# Patient Record
Sex: MALE | Race: WHITE | NOT HISPANIC OR LATINO | Employment: STUDENT | URBAN - METROPOLITAN AREA
[De-identification: names, ages, dates, MRNs, and addresses within clinical notes are randomized per-mention and may not be internally consistent; named-entity substitution may affect disease eponyms.]

---
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
## 2019-01-18 ENCOUNTER — OFFICE VISIT (OUTPATIENT)
Dept: INTERNAL MEDICINE CLINIC | Age: 19
End: 2019-01-18
Payer: COMMERCIAL

## 2019-01-18 VITALS
TEMPERATURE: 97.8 F | DIASTOLIC BLOOD PRESSURE: 60 MMHG | BODY MASS INDEX: 20.91 KG/M2 | HEART RATE: 78 BPM | HEIGHT: 69 IN | OXYGEN SATURATION: 97 % | WEIGHT: 141.2 LBS | SYSTOLIC BLOOD PRESSURE: 100 MMHG

## 2019-01-18 DIAGNOSIS — F84.9 PERVASIVE DEVELOPMENTAL DISORDER, RESIDUAL: ICD-10-CM

## 2019-01-18 DIAGNOSIS — F84.5 ASPERGER'S DISORDER: ICD-10-CM

## 2019-01-18 DIAGNOSIS — Z00.00 ANNUAL PHYSICAL EXAM: Primary | ICD-10-CM

## 2019-01-18 PROCEDURE — 99395 PREV VISIT EST AGE 18-39: CPT | Performed by: INTERNAL MEDICINE

## 2019-01-18 NOTE — PROGRESS NOTES
ADULT ANNUAL PHYSICAL  [facility]    NAME: Fina Vazquez  AGE: 20 y o  SEX: male  : 2000     DATE: 2019     Assessment and Plan:     Problem List Items Addressed This Visit     Asperger's disorder    Pervasive developmental disorder, residual      Other Visit Diagnoses     Annual physical exam    -  Primary          Health maintenance and preventative care screenings were discussed with patient today  Appropriate education was printed on patient's after visit summary  · Discussed risks/benefits of screening for high cholesterol  Patient agrees to screening for high cholesterol  · Immunizations were reviewed: patient is up-to-date with his immunizations  Counseling:  · Dental Health: discussed importance of regular tooth brushing, flossing, and dental visits  No Follow-up on file  Chief Complaint:     Chief Complaint   Patient presents with    Annual Exam     school      History of Present Illness:     Adult Annual Physical   Patient here for a comprehensive physical exam  The patient reports no problems  Diet and Physical Activity  · Diet/Nutrition: well balanced diet  · Weight concerns: none, patient's BMI is between 18 5-24 9  · Exercise: no formal exercise  Depression Screening  PHQ-9 Depression Screening    PHQ-9:    Frequency of the following problems over the past two weeks:            General Health  · Sleep: sleeps well  · Hearing: normal - bilateral   · Vision: goes for regular eye exams  · Dental: regular dental visits   Health  · History of STDs?: no   · Erectile dysfunction: no      Review of Systems:     Review of Systems   Constitutional: Negative for chills, fatigue, fever and unexpected weight change  HENT: Negative for congestion, ear pain, hearing loss, postnasal drip, sinus pressure, sore throat, trouble swallowing and voice change  Eyes: Negative for visual disturbance           Wears glasses Respiratory: Negative for cough, chest tightness, shortness of breath and wheezing  Cardiovascular: Negative for chest pain, palpitations and leg swelling  Gastrointestinal: Negative for abdominal distention, abdominal pain, anal bleeding, blood in stool, constipation, diarrhea and nausea  Endocrine: Negative for cold intolerance, polydipsia, polyphagia and polyuria  Genitourinary: Negative for dysuria, flank pain, frequency, hematuria and urgency  Musculoskeletal: Negative for arthralgias, back pain, gait problem, joint swelling, myalgias and neck pain  Skin: Negative for rash  Allergic/Immunologic: Negative for immunocompromised state  Neurological: Negative for dizziness, syncope, facial asymmetry, weakness, light-headedness, numbness and headaches  Hematological: Negative for adenopathy  Psychiatric/Behavioral: Positive for behavioral problems and decreased concentration  Negative for confusion, sleep disturbance and suicidal ideas  The patient is hyperactive  Past Medical History:     No past medical history on file     Past Surgical History:     Past Surgical History:   Procedure Laterality Date    NO PAST SURGERIES        Social History:     Social History     Social History    Marital status: Single     Spouse name: N/A    Number of children: N/A    Years of education: N/A     Social History Main Topics    Smoking status: Never Smoker    Smokeless tobacco: Never Used    Alcohol use No    Drug use: Unknown    Sexual activity: Not on file     Other Topics Concern    Not on file     Social History Narrative    No narrative on file      Family History:     Family History   Problem Relation Age of Onset    Lung cancer Mother     Diabetes Father       Current Medications:     Current Outpatient Prescriptions   Medication Sig Dispense Refill    atomoxetine (STRATTERA) 80 MG capsule       MULTIPLE VITAMINS-MINERALS ER PO Take by mouth      risperiDONE (RisperDAL) 1 mg tablet        No current facility-administered medications for this visit  Allergies:     No Known Allergies   Objective:     /60 (BP Location: Left arm, Patient Position: Sitting)   Pulse 78   Temp 97 8 °F (36 6 °C) (Tympanic)   Ht 5' 9" (1 753 m)   Wt 64 kg (141 lb 3 2 oz)   SpO2 97%   BMI 20 85 kg/m²     Physical Exam   Constitutional: He is oriented to person, place, and time  He appears well-developed and well-nourished  HENT:   Head: Normocephalic and atraumatic  Mouth/Throat: Oropharynx is clear and moist    Eyes: Pupils are equal, round, and reactive to light  Conjunctivae and EOM are normal    Neck: Normal range of motion  Neck supple  No thyromegaly present  Cardiovascular: Normal rate and regular rhythm  Pulmonary/Chest: Effort normal and breath sounds normal    Abdominal: Soft  Bowel sounds are normal    Musculoskeletal: Normal range of motion  He exhibits no edema  Lymphadenopathy:     He has no cervical adenopathy  Neurological: He is alert and oriented to person, place, and time  No cranial nerve deficit  Coordination normal    Skin: Skin is warm and dry  Psychiatric:    Patient has Asperger's to his arm hyperactivity and developmental delay   Vitals reviewed         Health Maintenance:     Health Maintenance   Topic Date Due    Depression Screening PHQ  09/17/2019    DTaP,Tdap,and Td Vaccines (7 - Td) 03/31/2024    INFLUENZA VACCINE  Completed     Immunization History   Administered Date(s) Administered    DTP 1999, 1999, 1999, 09/15/2003    HPV Quadrivalent 01/29/2016    Hep B, adult 1999, 1999, 1999    Hib (PRP-OMP) 1999, 1999, 1999, 02/08/2000    IPV 1999, 1999, 1999, 11/03/2004    Influenza Quadrivalent, 6-35 Months IM 11/01/2017    Influenza TIV (IM) 11/20/2013, 10/20/2014, 01/05/2016, 10/21/2016    Influenza, recombinant, quadrivalent,injectable, preservative free 09/17/2018    MMR 09/15/2003, 12/13/2004    Meningococcal, Unknown Serogroups 01/04/2011, 02/04/2015    Tdap 01/04/2011, 03/31/2014    Varicella 02/08/2000, 01/12/2007       Bonnie Varma MD  [facility]

## 2019-01-18 NOTE — PATIENT INSTRUCTIONS

## 2019-01-18 NOTE — ASSESSMENT & PLAN NOTE
Patient is here for a physical for continue benefits for training  He also needs an MA 51 done for same    He continues to go to a day program and low with his father

## 2019-03-21 DIAGNOSIS — J11.1 INFLUENZA: Primary | ICD-10-CM

## 2019-03-21 RX ORDER — OSELTAMIVIR PHOSPHATE 75 MG/1
75 CAPSULE ORAL EVERY 12 HOURS SCHEDULED
Qty: 10 CAPSULE | Refills: 0 | Status: SHIPPED | OUTPATIENT
Start: 2019-03-21 | End: 2019-03-26

## 2019-10-16 ENCOUNTER — OFFICE VISIT (OUTPATIENT)
Dept: INTERNAL MEDICINE CLINIC | Age: 19
End: 2019-10-16
Payer: MEDICARE

## 2019-10-16 VITALS
DIASTOLIC BLOOD PRESSURE: 60 MMHG | SYSTOLIC BLOOD PRESSURE: 104 MMHG | HEART RATE: 104 BPM | HEIGHT: 69 IN | WEIGHT: 171 LBS | OXYGEN SATURATION: 98 % | BODY MASS INDEX: 25.33 KG/M2 | TEMPERATURE: 97.8 F

## 2019-10-16 DIAGNOSIS — Z23 NEED FOR INFLUENZA VACCINATION: ICD-10-CM

## 2019-10-16 DIAGNOSIS — R73.9 HYPERGLYCEMIA: ICD-10-CM

## 2019-10-16 DIAGNOSIS — F90.2 ADHD (ATTENTION DEFICIT HYPERACTIVITY DISORDER), COMBINED TYPE: ICD-10-CM

## 2019-10-16 DIAGNOSIS — F41.9 ANXIETY: Primary | ICD-10-CM

## 2019-10-16 DIAGNOSIS — F84.9 PERVASIVE DEVELOPMENTAL DISORDER, RESIDUAL: ICD-10-CM

## 2019-10-16 PROCEDURE — 90682 RIV4 VACC RECOMBINANT DNA IM: CPT

## 2019-10-16 PROCEDURE — 99213 OFFICE O/P EST LOW 20 MIN: CPT | Performed by: INTERNAL MEDICINE

## 2019-10-16 PROCEDURE — 90471 IMMUNIZATION ADMIN: CPT

## 2019-10-16 RX ORDER — ATOMOXETINE 80 MG/1
80 CAPSULE ORAL DAILY
Qty: 30 CAPSULE | Refills: 5
Start: 2019-10-16 | End: 2021-04-01 | Stop reason: SDUPTHER

## 2019-10-16 RX ORDER — RISPERIDONE 1 MG/1
1 TABLET, FILM COATED ORAL 3 TIMES DAILY
Qty: 90 TABLET | Refills: 3
Start: 2019-10-16 | End: 2021-06-14 | Stop reason: SDUPTHER

## 2019-10-16 NOTE — ASSESSMENT & PLAN NOTE
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

## 2019-10-16 NOTE — ASSESSMENT & PLAN NOTE
Again he is followed by checkup for this problem    His father is currently working on figuring out where he will be followed after returns 20

## 2019-10-16 NOTE — PATIENT INSTRUCTIONS
Anxiety   AMBULATORY CARE:   Anxiety  is a condition that causes you to feel extremely worried or nervous  The feelings are so strong that they can cause problems with your daily activities or sleep  Anxiety may be triggered by something you fear, or it may happen without a cause  Family or work stress, smoking, caffeine, and alcohol can increase your risk for anxiety  Certain medicines or health conditions can also increase your risk  Anxiety can become a long-term condition if it is not managed or treated  Common signs and symptoms that may occur with anxiety:   · Fatigue or muscle tightness     · Shaking, restlessness, or irritability     · Problems focusing     · Trouble sleeping     · Feeling jumpy, easily startled, or dizzy     · Rapid heartbeat or shortness of breath  Call 911 if:   · You have chest pain, tightness, or heaviness that may spread to your shoulders, arms, jaw, neck, or back  · You feel like hurting yourself or someone else  Contact your healthcare provider if:   · Your symptoms get worse or do not get better with treatment  · You think your medicine may be causing side effects  · Your anxiety keeps you from doing your regular daily activities  · You have new symptoms since your last visit  · You have questions or concerns about your condition or care  Treatment for anxiety  may include medicines to help you feel calm and relaxed, and decrease your symptoms  Medicines are usually given together with therapy or other treatments  Manage anxiety:   · Talk to someone about your anxiety  Your healthcare provider may suggest counseling  Cognitive behavioral therapy can help you understand and change how you react to events that trigger your symptoms  You might feel more comfortable talking with a friend or family member about your anxiety  Choose someone you know will be supportive and encouraging  · Find ways to relax    Activities such as exercise, meditation, or listening to music can help you relax  Spend time with friends, or do things you enjoy  · Practice deep breathing  Deep breathing can help you relax when you feel anxious  Focus on taking slow, deep breaths several times a day, or during an anxiety attack  Breathe in through your nose and out through your mouth  · Create a regular sleep routine  Regular sleep can help you feel calmer during the day  Go to sleep and wake up at the same times every day  Do not watch television or use the computer right before bed  Your room should be comfortable, dark, and quiet  · Eat a variety of healthy foods  Healthy foods include fruits, vegetables, low-fat dairy products, lean meats, fish, whole-grain breads, and cooked beans  Healthy foods can help you feel less anxious and have more energy  · Exercise regularly  Exercise can increase your energy level  Exercise may also lift your mood and help you sleep better  Your healthcare provider can help you create an exercise plan  · Do not smoke  Nicotine and other chemicals in cigarettes and cigars can increase anxiety  Ask your healthcare provider for information if you currently smoke and need help to quit  E-cigarettes or smokeless tobacco still contain nicotine  Talk to your healthcare provider before you use these products  · Do not have caffeine  Caffeine can make your symptoms worse  Do not have foods or drinks that are meant to increase your energy level  · Limit or do not drink alcohol  Ask your healthcare provider if alcohol is safe for you  You may not be able to drink alcohol if you take certain anxiety or depression medicines  Limit alcohol to 1 drink per day if you are a woman  Limit alcohol to 2 drinks per day if you are a man  A drink of alcohol is 12 ounces of beer, 5 ounces of wine, or 1½ ounces of liquor  · Do not use drugs  Drugs can make your anxiety worse  It can also make anxiety hard to manage   Talk to your healthcare provider if you use drugs and want help to quit  Follow up with your healthcare provider as directed:  Write down your questions so you remember to ask them during your visits  © 2017 [facility] Information is for End User's use only and may not be sold, redistributed or otherwise used for commercial purposes  All illustrations and images included in CareNotes® are the copyrighted property of A D A M , Inc  or [employer]  The above information is an  only  It is not intended as medical advice for individual conditions or treatments  Talk to your doctor, nurse or pharmacist before following any medical regimen to see if it is safe and effective for you

## 2019-10-16 NOTE — PROGRESS NOTES
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  Anxiety  He continues to have fairly severe anxiety and takes clonazepam for same  His dose was recently increased to t i d     ADHD (attention deficit hyperactivity disorder), combined type  Again he is followed by checkup for this problem  His father is currently working on figuring out where he will be followed after returns 20    Hyperglycemia  In the past he has had some minimally elevated blood sugar and should have an A1c done       Diagnoses and all orders for this visit:    Anxiety  -     risperiDONE (RisperDAL) 1 mg tablet; Take 1 tablet (1 mg total) by mouth 3 (three) times a day  -     TSH, 3rd generation with Free T4 reflex; Future  -     Comprehensive metabolic panel; Future    ADHD (attention deficit hyperactivity disorder), combined type  -     risperiDONE (RisperDAL) 1 mg tablet; Take 1 tablet (1 mg total) by mouth 3 (three) times a day  -     atomoxetine (STRATTERA) 80 MG capsule; Take 1 capsule (80 mg total) by mouth daily    Hyperglycemia  -     HEMOGLOBIN A1C W/ EAG ESTIMATION; Future    Need for influenza vaccination  -     influenza vaccine, [id number], quadrivalent, recombinant, PF, 0 5 mL, for patients 18 yr+ ([id number])    Pervasive developmental disorder, residual          Subjective:      Patient ID: Gina Flaherty is a 20 y o  male  Patient is here for a checkup and needs a flu shot for his job  He has no new complaints  He continues to be followed by [facility] but is status not sure where he will be followed after returns 20  His only medical problem is that he had a minimally elevated blood sugar with some blood work     He has no significant weight gain and he does not any symptoms of hyperglycemia        Review of Systems   Constitutional: Negative for chills, fatigue, fever and unexpected weight change  HENT: Negative for congestion, ear pain, hearing loss, postnasal drip, sinus pressure, sore throat, trouble swallowing and voice change  Eyes: Negative for visual disturbance  Respiratory: Negative for cough, chest tightness, shortness of breath and wheezing  Cardiovascular: Negative for chest pain, palpitations and leg swelling  Gastrointestinal: Negative for abdominal distention, abdominal pain, anal bleeding, blood in stool, constipation, diarrhea and nausea  Endocrine: Negative for cold intolerance, polydipsia, polyphagia and polyuria  Genitourinary: Negative for dysuria, flank pain, frequency, hematuria and urgency  Musculoskeletal: Negative for arthralgias, back pain, gait problem, joint swelling, myalgias and neck pain  Skin: Negative for rash  Allergic/Immunologic: Negative for immunocompromised state  Neurological: Negative for dizziness, syncope, facial asymmetry, weakness, light-headedness, numbness and headaches  Hematological: Negative for adenopathy  Psychiatric/Behavioral: Positive for behavioral problems and decreased concentration  Negative for confusion, sleep disturbance and suicidal ideas  The patient is nervous/anxious and is hyperactive  Objective:      /60 (BP Location: Left arm, Patient Position: Sitting)   Pulse 104   Temp 97 8 °F (36 6 °C) (Tympanic)   Ht 5' 9" (1 753 m)   Wt 77 6 kg (171 lb)   SpO2 98%   BMI 25 25 kg/m²          Physical Exam   Constitutional: He is oriented to person, place, and time  He appears well-developed and well-nourished  No distress  HENT:   Right Ear: External ear normal    Left Ear: External ear normal    Nose: Nose normal    Mouth/Throat: Oropharynx is clear and moist  No oropharyngeal exudate  Eyes: Pupils are equal, round, and reactive to light  EOM are normal    Neck: Normal range of motion  Neck supple  No JVD present  No thyromegaly present     Cardiovascular: Normal rate, regular rhythm, normal heart sounds and intact distal pulses  Exam reveals no gallop  No murmur heard  Pulmonary/Chest: Effort normal and breath sounds normal  No respiratory distress  He has no wheezes  He has no rales  Abdominal: Soft  Bowel sounds are normal  He exhibits no distension and no mass  There is no tenderness  Musculoskeletal: Normal range of motion  He exhibits no tenderness  Lymphadenopathy:     He has no cervical adenopathy  Neurological: He is alert and oriented to person, place, and time  No cranial nerve deficit  Coordination normal    Skin: No rash noted  Psychiatric: He has a normal mood and affect     He has poor impulse control and decreased concentration along with anxiety

## 2019-10-16 NOTE — ASSESSMENT & PLAN NOTE
He continues to have fairly severe anxiety and takes clonazepam for same    His dose was recently increased to t i d

## 2020-03-17 ENCOUNTER — OFFICE VISIT (OUTPATIENT)
Dept: INTERNAL MEDICINE CLINIC | Age: 20
End: 2020-03-17
Payer: MEDICARE

## 2020-03-17 VITALS
OXYGEN SATURATION: 98 % | BODY MASS INDEX: 25.95 KG/M2 | TEMPERATURE: 98.4 F | HEIGHT: 69 IN | HEART RATE: 84 BPM | DIASTOLIC BLOOD PRESSURE: 72 MMHG | SYSTOLIC BLOOD PRESSURE: 132 MMHG | WEIGHT: 175.2 LBS

## 2020-03-17 DIAGNOSIS — Z00.00 WELL ADULT EXAM: Primary | ICD-10-CM

## 2020-03-17 DIAGNOSIS — Z00.00 MEDICARE ANNUAL WELLNESS VISIT, SUBSEQUENT: ICD-10-CM

## 2020-03-17 PROCEDURE — G0438 PPPS, INITIAL VISIT: HCPCS | Performed by: INTERNAL MEDICINE

## 2020-03-17 PROCEDURE — 3008F BODY MASS INDEX DOCD: CPT | Performed by: INTERNAL MEDICINE

## 2020-03-17 PROCEDURE — 1036F TOBACCO NON-USER: CPT | Performed by: INTERNAL MEDICINE

## 2020-03-17 PROCEDURE — 99213 OFFICE O/P EST LOW 20 MIN: CPT | Performed by: INTERNAL MEDICINE

## 2020-03-17 NOTE — PROGRESS NOTES
Assessment and Plan:     Problem List Items Addressed This Visit     None      Visit Diagnoses     Medicare annual wellness visit, subsequent        BMI 25 0-25 9,adult            BMI Counseling: Body mass index is 25 87 kg/m²  The BMI is above normal  Nutrition recommendations include decreasing portion sizes and consuming healthier snacks  Exercise recommendations include exercising 3-5 times per week  Preventive health issues were discussed with patient, and age appropriate screening tests were ordered as noted in patient's After Visit Summary  Personalized health advice and appropriate referrals for health education or preventive services given if needed, as noted in patient's After Visit Summary  History of Present Illness:     Patient presents for Medicare Annual Wellness visit    Patient Care Team:  Ankit Zarco MD as PCP - General     Problem List:     Patient Active Problem List   Diagnosis    Well adult exam    ADHD (attention deficit hyperactivity disorder), combined type    Anxiety    Asperger's disorder    Autistic disorder, active    Pervasive developmental disorder, residual    Hyperglycemia      Past Medical and Surgical History:     No past medical history on file    Past Surgical History:   Procedure Laterality Date    NO PAST SURGERIES        Family History:     Family History   Problem Relation Age of Onset    Lung cancer Mother     Diabetes Father       Social History:        Social History     Socioeconomic History    Marital status: Single     Spouse name: None    Number of children: None    Years of education: None    Highest education level: None   Occupational History    None   Social Needs    Financial resource strain: None    Food insecurity:     Worry: None     Inability: None    Transportation needs:     Medical: None     Non-medical: None   Tobacco Use    Smoking status: Never Smoker    Smokeless tobacco: Never Used   Substance and Sexual Activity    Alcohol use: No    Drug use: Never    Sexual activity: None   Lifestyle    Physical activity:     Days per week: None     Minutes per session: None    Stress: None   Relationships    Social connections:     Talks on phone: None     Gets together: None     Attends Yarsanism service: None     Active member of club or organization: None     Attends meetings of clubs or organizations: None     Relationship status: None    Intimate partner violence:     Fear of current or ex partner: None     Emotionally abused: None     Physically abused: None     Forced sexual activity: None   Other Topics Concern    None   Social History Narrative    None      Medications and Allergies:     Current Outpatient Medications   Medication Sig Dispense Refill    atomoxetine (STRATTERA) 80 MG capsule Take 1 capsule (80 mg total) by mouth daily 30 capsule 5    MULTIPLE VITAMINS-MINERALS ER PO Take by mouth      risperiDONE (RisperDAL) 1 mg tablet Take 1 tablet (1 mg total) by mouth 3 (three) times a day 90 tablet 3     No current facility-administered medications for this visit  No Known Allergies   Immunizations:     Immunization History   Administered Date(s) Administered    DTP 1999, 1999, 1999, 09/15/2003    HPV Quadrivalent 01/29/2016    Hep B, adult 1999, 1999, 1999    Hib (PRP-OMP) 1999, 1999, 1999, 02/08/2000    IPV 1999, 1999, 1999, 11/03/2004    Influenza Quadrivalent, 6-35 Months IM 11/01/2017    Influenza TIV (IM) 11/20/2013, 10/20/2014, 01/05/2016, 10/21/2016    Influenza, recombinant, quadrivalent,injectable, preservative free 09/17/2018, 10/16/2019    MMR 09/15/2003, 12/13/2004    Meningococcal, Unknown Serogroups 01/04/2011, 02/04/2015    Tdap 01/04/2011, 03/31/2014    Varicella 02/08/2000, 01/12/2007      Health Maintenance: There are no preventive care reminders to display for this patient        Topic Date Due    HPV Vaccine (2 - Male 3-dose series) 02/26/2016      Medicare Health Risk Assessment:     /72 (BP Location: Left arm, Patient Position: Sitting)   Pulse 84   Temp 98 4 °F (36 9 °C) (Tympanic)   Ht 5' 9" (1 753 m)   Wt 79 5 kg (175 lb 3 2 oz)   SpO2 98%   BMI 25 87 kg/m²      Milad Esteban is here for his Initial Wellness visit  Health Risk Assessment:   Patient rates overall health as good  Patient feels that their physical health rating is same  Eyesight was rated as same  Hearing was rated as same  Patient feels that their emotional and mental health rating is same  Pain experienced in the last 7 days has been some  Patient states that he has experienced no weight loss or gain in last 6 months  Depression Screening:   PHQ-2 Score: 0      Fall Risk Screening: In the past year, patient has experienced: no history of falling in past year      Home Safety:  Patient does not have trouble with stairs inside or outside of their home  Patient has working smoke alarms and has working carbon monoxide detector  Home safety hazards include: none  Medications:   Patient is currently taking over-the-counter supplements  OTC medications include: see medication list  Patient is able to manage medications  Activities of Daily Living (ADLs)/Instrumental Activities of Daily Living (IADLs):   Walk and transfer into and out of bed and chair?: Yes  Dress and groom yourself?: Yes    Bathe or shower yourself?: Yes    Feed yourself?  Yes  Do your laundry/housekeeping?: No  Manage your money, pay your bills and track your expenses?: No  Make your own meals?: No    Do your own shopping?: No    Previous Hospitalizations:   Any hospitalizations or ED visits within the last 12 months?: No      Advance Care Planning:   Living will: No    Durable POA for healthcare: No    Advanced directive: No    Advanced directive counseling given: Yes      Cognitive Screening:   Provider or family/friend/caregiver concerned regarding cognition?: No    PREVENTIVE SCREENINGS      Cardiovascular Screening:    General: Screening Current      Colorectal Cancer Screening:     General: Screening Not Indicated      Prostate Cancer Screening:    General: Screening Not Indicated      Osteoporosis Screening:    General: Screening Not Indicated      Abdominal Aortic Aneurysm (AAA) Screening:        General: Screening Not Indicated      Lung Cancer Screening:     General: Screening Not Indicated      Hepatitis C Screening:    General: Screening Not Indicated    Other Counseling Topics:   Regular weightbearing exercise         Bre Gutiérrez MD

## 2020-03-17 NOTE — ASSESSMENT & PLAN NOTE
Patient continues to require training and help with his Asperger's and behavioral issues    He needs paperwork so he can continue with his programs

## 2020-03-17 NOTE — PROGRESS NOTES
Assessment/Plan:    Well adult exam  Patient needs it mA 51 filled out he has no new complaints  Most of his problems are behavioral related       Diagnoses and all orders for this visit:    Well adult exam    Medicare annual wellness visit, subsequent    BMI 25 0-25 9,adult          Subjective:      Patient ID: Juan Simms is a 23 y o  male  Patient is nucleus without complaints put he has obvious behavioral issues forced which she is still getting treatment and training for  His medications are being managed by specialist  He is due for routine blood work but did not have it          Review of Systems   Constitutional: Negative for chills, fatigue, fever and unexpected weight change  HENT: Negative for congestion, ear pain, hearing loss, postnasal drip, sinus pressure, sore throat, trouble swallowing and voice change  Eyes: Negative for visual disturbance  Respiratory: Negative for cough, chest tightness, shortness of breath and wheezing  Cardiovascular: Negative for chest pain, palpitations and leg swelling  Gastrointestinal: Negative for abdominal distention, abdominal pain, anal bleeding, blood in stool, constipation, diarrhea and nausea  Endocrine: Negative for cold intolerance, polydipsia, polyphagia and polyuria  Genitourinary: Negative for dysuria, flank pain, frequency, hematuria and urgency  Musculoskeletal: Negative for arthralgias, back pain, gait problem, joint swelling, myalgias and neck pain  Skin: Negative for rash  Allergic/Immunologic: Negative for immunocompromised state  Neurological: Negative for dizziness, syncope, facial asymmetry, weakness, light-headedness, numbness and headaches  Hematological: Negative for adenopathy  Psychiatric/Behavioral: Positive for behavioral problems  Negative for confusion, sleep disturbance and suicidal ideas  The patient is nervous/anxious and is hyperactive            Objective:      /72 (BP Location: Left arm, Patient Position: Sitting)   Pulse 84   Temp 98 4 °F (36 9 °C) (Tympanic)   Ht 5' 9" (1 753 m)   Wt 79 5 kg (175 lb 3 2 oz)   SpO2 98%   BMI 25 87 kg/m²          Physical Exam   Constitutional: He is oriented to person, place, and time  He appears well-developed and well-nourished  No distress  HENT:   Right Ear: External ear normal    Left Ear: External ear normal    Nose: Nose normal    Mouth/Throat: Oropharynx is clear and moist  No oropharyngeal exudate  Eyes: Pupils are equal, round, and reactive to light  EOM are normal    Neck: Normal range of motion  Neck supple  No JVD present  No thyromegaly present  Cardiovascular: Normal rate, regular rhythm, normal heart sounds and intact distal pulses  Exam reveals no gallop  No murmur heard  Pulmonary/Chest: Effort normal and breath sounds normal  No respiratory distress  He has no wheezes  He has no rales  Abdominal: Soft  Bowel sounds are normal  He exhibits no distension and no mass  There is no tenderness  Musculoskeletal: Normal range of motion  He exhibits no tenderness  Lymphadenopathy:     He has no cervical adenopathy  Neurological: He is alert and oriented to person, place, and time  No cranial nerve deficit  Coordination normal    Skin: No rash noted  Psychiatric: He has a normal mood and affect   Thought content normal

## 2020-03-17 NOTE — ASSESSMENT & PLAN NOTE
Patient needs it mA 51 filled out he has no new complaints    Most of his problems are behavioral related

## 2020-03-17 NOTE — PATIENT INSTRUCTIONS
Medicare Preventive Visit Patient Instructions  Thank you for completing your Welcome to Medicare Visit or Medicare Annual Wellness Visit today  Your next wellness visit will be due in one year (3/17/2021)  The screening/preventive services that you may require over the next 5-10 years are detailed below  Some tests may not apply to you based off risk factors and/or age  Screening tests ordered at today's visit but not completed yet may show as past due  Also, please note that scanned in results may not display below  Preventive Screenings:  Service Recommendations Previous Testing/Comments   Colorectal Cancer Screening  · Colonoscopy    · Fecal Occult Blood Test (FOBT)/Fecal Immunochemical Test (FIT)  · Fecal DNA/Cologuard Test  · Flexible Sigmoidoscopy Age: 54-65 years old   Colonoscopy: every 10 years (May be performed more frequently if at higher risk)  OR  FOBT/FIT: every 1 year  OR  Cologuard: every 3 years  OR  Sigmoidoscopy: every 5 years  Screening may be recommended earlier than age 48 if at higher risk for colorectal cancer  Also, an individualized decision between you and your healthcare provider will decide whether screening between the ages of 74-80 would be appropriate   Colonoscopy: Not on file  FOBT/FIT: Not on file  Cologuard: Not on file  Sigmoidoscopy: Not on file         Prostate Cancer Screening Individualized decision between patient and health care provider in men between ages of 53-78   Medicare will cover every 12 months beginning on the day after your 50th birthday PSA: No results in last 5 years          Hepatitis C Screening Once for adults born between [facility]  More frequently in patients at high risk for Hepatitis C Hep C Antibody: Not on file       Diabetes Screening 1-2 times per year if you're at risk for diabetes or have pre-diabetes Fasting glucose: 89 mg/dL   A1C: No results in last 5 years       Cholesterol Screening Once every 5 years if you don't have a lipid disorder  May order more often based on risk factors  Lipid panel: 05/14/2019          Other Preventive Screenings Covered by Medicare:  1  Abdominal Aortic Aneurysm (AAA) Screening: covered once if your at risk  You're considered to be at risk if you have a family history of AAA or a male between the age of 73-68 who smoking at least 100 cigarettes in your lifetime  2  Lung Cancer Screening: covers low dose CT scan once per year if you meet all of the following conditions: (1) Age 50-69; (2) No signs or symptoms of lung cancer; (3) Current smoker or have quit smoking within the last 15 years; (4) You have a tobacco smoking history of at least 30 pack years (packs per day x number of years you smoked); (5) You get a written order from a healthcare provider  3  Glaucoma Screening: covered annually if you're considered high risk: (1) You have diabetes OR (2) Family history of glaucoma OR (3)  aged 48 and older OR (3)  American aged 72 and older  3  Osteoporosis Screening: covered every 2 years if you meet one of the following conditions: (1) Have a vertebral abnormality; (2) On glucocorticoid therapy for more than 3 months; (3) Have primary hyperparathyroidism; (4) On osteoporosis medications and need to assess response to drug therapy  5  HIV Screening: covered annually if you're between the age of 12-76  Also covered annually if you are younger than 13 and older than 72 with risk factors for HIV infection  For pregnant patients, it is covered up to 3 times per pregnancy      Immunizations:  Immunization Recommendations   Influenza Vaccine Annual influenza vaccination during flu season is recommended for all persons aged >= 6 months who do not have contraindications   Pneumococcal Vaccine (Prevnar and Pneumovax)  * Prevnar = PCV13  * Pneumovax = PPSV23 Adults 25-60 years old: 1-3 doses may be recommended based on certain risk factors  Adults 72 years old: Prevnar (PCV13) vaccine recommended followed by Pneumovax (PPSV23) vaccine  If already received PPSV23 since turning 65, then PCV13 recommended at least one year after PPSV23 dose  Hepatitis B Vaccine 3 dose series if at intermediate or high risk (ex: diabetes, end stage renal disease, liver disease)   Tetanus (Td) Vaccine - COST NOT COVERED BY MEDICARE PART B Following completion of primary series, a booster dose should be given every 10 years to maintain immunity against tetanus  Td may also be given as tetanus wound prophylaxis  Tdap Vaccine - COST NOT COVERED BY MEDICARE PART B Recommended at least once for all adults  For pregnant patients, recommended with each pregnancy  Shingles Vaccine (Shingrix) - COST NOT COVERED BY MEDICARE PART B  2 shot series recommended in those aged 48 and above     Health Maintenance Due:  There are no preventive care reminders to display for this patient  Immunizations Due:      Topic Date Due    HPV Vaccine (2 - Male 3-dose series) 02/26/2016     Advance Directives   What are advance directives? Advance directives are legal documents that state your wishes and plans for medical care  These plans are made ahead of time in case you lose your ability to make decisions for yourself  Advance directives can apply to any medical decision, such as the treatments you want, and if you want to donate organs  What are the types of advance directives? There are many types of advance directives, and each state has rules about how to use them  You may choose a combination of any of the following:  · Living will: This is a written record of the treatment you want  You can also choose which treatments you do not want, which to limit, and which to stop at a certain time  This includes surgery, medicine, IV fluid, and tube feedings  · Durable power of  for healthcare [facility]): This is a written record that states who you want to make healthcare choices for you when you are unable to make them for yourself   This person, called a proxy, is usually a family member or a friend  You may choose more than 1 proxy  · Do not resuscitate (DNR) order:  A DNR order is used in case your heart stops beating or you stop breathing  It is a request not to have certain forms of treatment, such as CPR  A DNR order may be included in other types of advance directives  · Medical directive: This covers the care that you want if you are in a coma, near death, or unable to make decisions for yourself  You can list the treatments you want for each condition  Treatment may include pain medicine, surgery, blood transfusions, dialysis, IV or tube feedings, and a ventilator (breathing machine)  · Values history: This document has questions about your views, beliefs, and how you feel and think about life  This information can help others choose the care that you would choose  Why are advance directives important? An advance directive helps you control your care  Although spoken wishes may be used, it is better to have your wishes written down  Spoken wishes can be misunderstood, or not followed  Treatments may be given even if you do not want them  An advance directive may make it easier for your family to make difficult choices about your care  Weight Management   Why it is important to manage your weight:  Being overweight increases your risk of health conditions such as heart disease, high blood pressure, type 2 diabetes, and certain types of cancer  It can also increase your risk for osteoarthritis, sleep apnea, and other respiratory problems  Aim for a slow, steady weight loss  Even a small amount of weight loss can lower your risk of health problems  How to lose weight safely:  A safe and healthy way to lose weight is to eat fewer calories and get regular exercise  You can lose up about 1 pound a week by decreasing the number of calories you eat by 500 calories each day     Healthy meal plan for weight management:  A healthy meal plan includes a variety of foods, contains fewer calories, and helps you stay healthy  A healthy meal plan includes the following:  · Eat whole-grain foods more often  A healthy meal plan should contain fiber  Fiber is the part of grains, fruits, and vegetables that is not broken down by your body  Whole-grain foods are healthy and provide extra fiber in your diet  Some examples of whole-grain foods are whole-wheat breads and pastas, oatmeal, brown rice, and bulgur  · Eat a variety of vegetables every day  Include dark, leafy greens such as spinach, kale, elizabeth greens, and mustard greens  Eat yellow and orange vegetables such as carrots, sweet potatoes, and winter squash  · Eat a variety of fruits every day  Choose fresh or canned fruit (canned in its own juice or light syrup) instead of juice  Fruit juice has very little or no fiber  · Eat low-fat dairy foods  Drink fat-free (skim) milk or 1% milk  Eat fat-free yogurt and low-fat cottage cheese  Try low-fat cheeses such as mozzarella and other reduced-fat cheeses  · Choose meat and other protein foods that are low in fat  Choose beans or other legumes such as split peas or lentils  Choose fish, skinless poultry (chicken or turkey), or lean cuts of red meat (beef or pork)  Before you cook meat or poultry, cut off any visible fat  · Use less fat and oil  Try baking foods instead of frying them  Add less fat, such as margarine, sour cream, regular salad dressing and mayonnaise to foods  Eat fewer high-fat foods  Some examples of high-fat foods include french fries, doughnuts, ice cream, and cakes  · Eat fewer sweets  Limit foods and drinks that are high in sugar  This includes candy, cookies, regular soda, and sweetened drinks  Exercise:  Exercise at least 30 minutes per day on most days of the week  Some examples of exercise include walking, biking, dancing, and swimming   You can also fit in more physical activity by taking the stairs instead of the elevator or parking farther away from stores  Ask your healthcare provider about the best exercise plan for you  © Copyright [employer] 2018 Information is for End User's use only and may not be sold, redistributed or otherwise used for commercial purposes  All illustrations and images included in CareNotes® are the copyrighted property of [employer]  or Josey Gracia   Weight Management   AMBULATORY CARE:   Why it is important to manage your weight:  Being overweight increases your risk of health conditions such as heart disease, high blood pressure, type 2 diabetes, and certain types of cancer  It can also increase your risk for osteoarthritis, sleep apnea, and other respiratory problems  Aim for a slow, steady weight loss  Even a small amount of weight loss can lower your risk of health problems  How to lose weight safely:  A safe and healthy way to lose weight is to eat fewer calories and get regular exercise  You can lose up about 1 pound a week by decreasing the number of calories you eat by 500 calories each day  You can decrease calories by eating smaller portion sizes or by cutting out high-calorie foods  Read labels to find out how many calories are in the foods you eat  You can also burn calories with exercise such as walking, swimming, or biking  You will be more likely to keep weight off if you make these changes part of your lifestyle  Healthy meal plan for weight management:  A healthy meal plan includes a variety of foods, contains fewer calories, and helps you stay healthy  A healthy meal plan includes the following:  · Eat whole-grain foods more often  A healthy meal plan should contain fiber  Fiber is the part of grains, fruits, and vegetables that is not broken down by your body  Whole-grain foods are healthy and provide extra fiber in your diet  Some examples of whole-grain foods are whole-wheat breads and pastas, oatmeal, brown rice, and bulgur      · Eat a variety of vegetables every day  Include dark, leafy greens such as spinach, kale, elizabeth greens, and mustard greens  Eat yellow and orange vegetables such as carrots, sweet potatoes, and winter squash  · Eat a variety of fruits every day  Choose fresh or canned fruit (canned in its own juice or light syrup) instead of juice  Fruit juice has very little or no fiber  · Eat low-fat dairy foods  Drink fat-free (skim) milk or 1% milk  Eat fat-free yogurt and low-fat cottage cheese  Try low-fat cheeses such as mozzarella and other reduced-fat cheeses  · Choose meat and other protein foods that are low in fat  Choose beans or other legumes such as split peas or lentils  Choose fish, skinless poultry (chicken or turkey), or lean cuts of red meat (beef or pork)  Before you cook meat or poultry, cut off any visible fat  · Use less fat and oil  Try baking foods instead of frying them  Add less fat, such as margarine, sour cream, regular salad dressing and mayonnaise to foods  Eat fewer high-fat foods  Some examples of high-fat foods include french fries, doughnuts, ice cream, and cakes  · Eat fewer sweets  Limit foods and drinks that are high in sugar  This includes candy, cookies, regular soda, and sweetened drinks  Ways to decrease calories:   · Eat smaller portions  ¨ Use a small plate with smaller servings  ¨ Do not eat second helpings  ¨ When you eat at a restaurant, ask for a box and place half of your meal in the box before you eat  ¨ Share an entrée with someone else  · Replace high-calorie snacks with healthy, low-calorie snacks  ¨ Choose fresh fruit, vegetables, fat-free rice cakes, or air-popped popcorn instead of potato chips, nuts, or chocolate  ¨ Choose water or calorie-free drinks instead of soda or sweetened drinks  · Eat regular meals  Skipping meals can lead to overeating later in the day   Eat a healthy snack in place of a meal if you do not have time to eat a regular meal      · Do not shop for groceries when you are hungry  You may be more likely to make unhealthy food choices  Take a grocery list of healthy foods and shop after you have eaten  Exercise:  Exercise at least 30 minutes per day on most days of the week  Some examples of exercise include walking, biking, dancing, and swimming  You can also fit in more physical activity by taking the stairs instead of the elevator or parking farther away from stores  Ask your healthcare provider about the best exercise plan for you  Other things to consider as you try to lose weight:   · Be aware of situations that may give you the urge to overeat, such as eating while watching television  Find ways to avoid these situations  For example, read a book, go for a walk, or do crafts  · Meet with a weight loss support group or friends who are also trying to lose weight  This may help you stay motivated to continue working on your weight loss goals  © 2017 [employer] Information is for End User's use only and may not be sold, redistributed or otherwise used for commercial purposes  All illustrations and images included in CareNotes® are the copyrighted property of [employer]  or Sabino Stanford  The above information is an  only  It is not intended as medical advice for individual conditions or treatments  Talk to your doctor, nurse or pharmacist before following any medical regimen to see if it is safe and effective for you  Low Fat Diet   AMBULATORY CARE:   A low-fat diet  is an eating plan that is low in total fat, unhealthy fat, and cholesterol  You may need to follow a low-fat diet if you have trouble digesting or absorbing fat  You may also need to follow this diet if you have high cholesterol  You can also lower your cholesterol by increasing the amount of fiber in your diet  Soluble fiber is a type of fiber that helps to decrease cholesterol levels     Different types of fat in food:   · Limit unhealthy fats  A diet that is high in cholesterol, saturated fat, and trans fat may cause unhealthy cholesterol levels  Unhealthy cholesterol levels increase your risk of heart disease  ¨ Cholesterol:  Limit intake of cholesterol to less than 200 mg per day  Cholesterol is found in meat, eggs, and dairy  ¨ Saturated fat:  Limit saturated fat to less than 7% of your total daily calories  Ask your dietitian how many calories you need each day  Saturated fat is found in butter, cheese, ice cream, whole milk, and palm oil  Saturated fat is also found in meat, such as beef, pork, chicken skin, and processed meats  Processed meats include sausage, hot dogs, and bologna  ¨ Trans fat:  Avoid trans fat as much as possible  Trans fat is used in fried and baked foods  Foods that say trans fat free on the label may still have up to 0 5 grams of trans fat per serving  · Include healthy fats  Replace foods that are high in saturated and trans fat with foods high in healthy fats  This may help to decrease high cholesterol levels  ¨ Monounsaturated fats: These are found in avocados, nuts, and vegetable oils, such as olive, canola, and sunflower oil  ¨ Polyunsaturated fats: These can be found in vegetable oils, such as soybean or corn oil  Omega-3 fats can help to decrease the risk of heart disease  Omega-3 fats are found in fish, such as salmon, herring, trout, and tuna  Omega-3 fats can also be found in plant foods, such as walnuts, flaxseed, soybeans, and canola oil    Foods to limit or avoid:   · Grains:      ¨ Snacks that are made with partially hydrogenated oils, such as chips, regular crackers, and butter-flavored popcorn    ¨ High-fat baked goods, such as biscuits, croissants, doughnuts, pies, cookies, and pastries    · Dairy:      ¨ Whole milk, 2% milk, and yogurt and ice cream made with whole milk    ¨ Half and half creamer, heavy cream, and whipping cream    ¨ Cheese, cream cheese, and sour cream    · Meats and proteins:      ¨ High-fat cuts of meat (T-bone steak, regular hamburger, and ribs)    ¨ Fried meat, poultry (turkey and chicken), and fish    ¨ Poultry (chicken and turkey) with skin    ¨ Cold cuts (salami or bologna), hot dogs, boo, and sausage    ¨ Whole eggs and egg yolks    · Vegetables and fruits with added fat:      ¨ Fried vegetables or vegetables in butter or high-fat sauces, such as cream or cheese sauces    ¨ Fried fruit or fruit served with butter or cream    · Fats:      ¨ Butter, stick margarine, and shortening    ¨ Coconut, palm oil, and palm kernel oil  Foods to include:   · Grains:      ¨ Whole-grain breads, cereals, pasta, and brown rice    ¨ Low-fat crackers and pretzels    · Vegetables and fruits:      ¨ Fresh, frozen, or canned vegetables (no salt or low-sodium)    ¨ Fresh, frozen, dried, or canned fruit (canned in light syrup or fruit juice)    ¨ Avocado    · Low-fat dairy products:      ¨ Nonfat (skim) or 1% milk    ¨ Nonfat or low-fat cheese, yogurt, and cottage cheese    · Meats and proteins:      ¨ Chicken or turkey with no skin    ¨ Baked or broiled fish    ¨ Lean beef and pork (loin, round, extra lean hamburger)    ¨ Beans and peas, unsalted nuts, soy products    ¨ Egg whites and substitutes    ¨ Seeds and nuts    · Fats:      ¨ Unsaturated oil, such as canola, olive, peanut, soybean, or sunflower oil    ¨ Soft or liquid margarine and vegetable oil spread    ¨ Low-fat salad dressing  Other ways to decrease fat:   · Read food labels before you buy foods  Choose foods that have less than 30% of calories from fat  Choose low-fat or fat-free dairy products  Remember that fat free does not mean calorie free  These foods still contain calories, and too many calories can lead to weight gain  · Trim fat from meat and avoid fried food  Trim all visible fat from meat before you cook it  Remove the skin from poultry  Do not rivas meat, fish, or poultry   Bake, roast, boil, or broil these foods instead  Avoid fried foods  Eat a baked potato instead of [place] fries  Steam vegetables instead of sautéing them in butter  · Add less fat to foods  Use imitation boo bits on salads and baked potatoes instead of regular boo bits  Use fat-free or low-fat salad dressings instead of regular dressings  Use low-fat or nonfat butter-flavored topping instead of regular butter or margarine on popcorn and other foods  Ways to decrease fat in recipes:  Replace high-fat ingredients with low-fat or nonfat ones  This may cause baked goods to be drier than usual  You may need to use nonfat cooking spray on pans to prevent food from sticking  You also may need to change the amount of other ingredients, such as water, in the recipe  Try the following:  · Use low-fat or light margarine instead of regular margarine or shortening  · Use lean ground turkey breast or chicken, or lean ground beef (less than 5% fat) instead of hamburger  · Add 1 teaspoon of canola oil to 8 ounces of skim milk instead of using cream or half and half  · Use grated zucchini, carrots, or apples in breads instead of coconut  · Use blenderized, low-fat cottage cheese, plain tofu, or low-fat ricotta cheese instead of cream cheese  · Use 1 egg white and 1 teaspoon of canola oil, or use ¼ cup (2 ounces) of fat-free egg substitute instead of a whole egg  · Replace half of the oil that is called for in a recipe with applesauce when you bake  Use 3 tablespoons of cocoa powder and 1 tablespoon of canola oil instead of a square of baking chocolate  How to increase fiber:  Eat enough high-fiber foods to get 20 to 30 grams of fiber every day  Slowly increase your fiber intake to avoid stomach cramps, gas, and other problems  · Eat 3 ounces of whole-grain foods each day  An ounce is about 1 slice of bread  Eat whole-grain breads, such as whole-wheat bread   Whole wheat, whole-wheat flour, or other whole grains should be listed as the first ingredient on the food label  Replace white flour with whole-grain flour or use half of each in recipes  Whole-grain flour is heavier than white flour, so you may have to add more yeast or baking powder  · Eat a high-fiber cereal for breakfast   Oatmeal is a good source of soluble fiber  Look for cereals that have bran or fiber in the name  Choose whole-grain products, such as brown rice, barley, and whole-wheat pasta  · Eat more beans, peas, and lentils  For example, add beans to soups or salads  Eat at least 5 cups of fruits and vegetables each day  Eat fruits and vegetables with the peel because the peel is high in fiber  © 2017 [employer] Information is for End User's use only and may not be sold, redistributed or otherwise used for commercial purposes  All illustrations and images included in CareNotes® are the copyrighted property of [employer]  or Sabino Stanford  The above information is an  only  It is not intended as medical advice for individual conditions or treatments  Talk to your doctor, nurse or pharmacist before following any medical regimen to see if it is safe and effective for you  Heart Healthy Diet   AMBULATORY CARE:   A heart healthy diet  is an eating plan low in total fat, unhealthy fats, and sodium (salt)  A heart healthy diet helps decrease your risk for heart disease and stroke  Limit the amount of fat you eat to 25% to 35% of your total daily calories  Limit sodium to less than 2,300 mg each day  Healthy fats:  Healthy fats can help improve cholesterol levels  The risk for heart disease is decreased when cholesterol levels are normal  Choose healthy fats, such as the following:  · Unsaturated fat  is found in foods such as soybean, canola, olive, corn, and safflower oils  It is also found in soft tub margarine that is made with liquid vegetable oil       · Omega-3 fat  is found in certain fish, such as salmon, tuna, and trout, and in walnuts and flaxseed  Unhealthy fats:  Unhealthy fats can cause unhealthy cholesterol levels in your blood and increase your risk of heart disease  Limit unhealthy fats, such as the following:  · Cholesterol  is found in animal foods, such as eggs and lobster, and in dairy products made from whole milk  Limit cholesterol to less than 300 milligrams (mg) each day  You may need to limit cholesterol to 200 mg each day if you have heart disease  · Saturated fat  is found in meats, such as boo and hamburger  It is also found in chicken or turkey skin, whole milk, and butter  Limit saturated fat to less than 7% of your total daily calories  Limit saturated fat to less than 6% if you have heart disease or are at increased risk for it  · Trans fat  is found in packaged foods, such as potato chips and cookies  It is also in hard margarine, some fried foods, and shortening  Avoid trans fats as much as possible    Heart healthy foods and drinks to include:  Ask your dietitian or healthcare provider how many servings to have from each of the following food groups:  · Grains:      ¨ Whole-wheat breads, cereals, and pastas, and brown rice    ¨ Low-fat, low-sodium crackers and chips    · Vegetables:      ¨ Broccoli, green beans, green peas, and spinach    ¨ Collards, kale, and lima beans    ¨ Carrots, sweet potatoes, tomatoes, and peppers    ¨ Canned vegetables with no salt added    · Fruits:      ¨ Bananas, peaches, pears, and pineapple    ¨ Grapes, raisins, and dates    ¨ Oranges, tangerines, grapefruit, orange juice, and grapefruit juice    ¨ Apricots, mangoes, melons, and papaya    ¨ Raspberries and strawberries    ¨ Canned fruit with no added sugar    · Low-fat dairy products:      ¨ Nonfat (skim) milk, 1% milk, and low-fat almond, cashew, or soy milks fortified with calcium    ¨ Low-fat cheese, regular or frozen yogurt, and cottage cheese    · Meats and proteins , such as lean cuts of beef and pork (loin, leg, round), skinless chicken and turkey, legumes, soy products, egg whites, and nuts  Foods and drinks to limit or avoid:  Ask your dietitian or healthcare provider about these and other foods that are high in unhealthy fat, sodium, and sugar:  · Snack or packaged foods , such as frozen dinners, cookies, macaroni and cheese, and cereals with more than 300 mg of sodium per serving    · Canned or dry mixes  for cakes, soups, sauces, or gravies    · Vegetables with added sodium , such as instant potatoes, vegetables with added sauces, or regular canned vegetables    · Other foods high in sodium , such as ketchup, barbecue sauce, salad dressing, pickles, olives, soy sauce, and miso    · High-fat dairy foods  such as whole or 2% milk, cream cheese, or sour cream, and cheeses     · High-fat protein foods  such as high-fat cuts of beef (T-bone steaks, ribs), chicken or turkey with skin, and organ meats, such as liver    · Cured or smoked meats , such as hot dogs, boo, and sausage    · Unhealthy fats and oils , such as butter, stick margarine, shortening, and cooking oils such as coconut or palm oil    · Food and drinks high in sugar , such as soft drinks (soda), sports drinks, sweetened tea, candy, cake, cookies, pies, and doughnuts  Other diet guidelines to follow:   · Eat more foods containing omega-3 fats  Eat fish high in omega-3 fats at least 2 times a week  · Limit alcohol  Too much alcohol can damage your heart and raise your blood pressure  Women should limit alcohol to 1 drink a day  Men should limit alcohol to 2 drinks a day  A drink of alcohol is 12 ounces of beer, 5 ounces of wine, or 1½ ounces of liquor  · Choose low-sodium foods  High-sodium foods can lead to high blood pressure  Add little or no salt to food you prepare  Use herbs and spices in place of salt  · Eat more fiber  to help lower cholesterol levels   Eat at least 5 servings of fruits and vegetables each day  Eat 3 ounces of whole-grain foods each day  Legumes (beans) are also a good source of fiber  Lifestyle guidelines:   · Do not smoke  Nicotine and other chemicals in cigarettes and cigars can cause lung and heart damage  Ask your healthcare provider for information if you currently smoke and need help to quit  E-cigarettes or smokeless tobacco still contain nicotine  Talk to your healthcare provider before you use these products  · Exercise regularly  to help you maintain a healthy weight and improve your blood pressure and cholesterol levels  Ask your healthcare provider about the best exercise plan for you  Do not start an exercise program without asking your healthcare provider  Follow up with your healthcare provider as directed:  Write down your questions so you remember to ask them during your visits  © 2017 [facility] Information is for End User's use only and may not be sold, redistributed or otherwise used for commercial purposes  All illustrations and images included in CareNotes® are the copyrighted property of A D A M , Inc  or Sabino Stanford  The above information is an  only  It is not intended as medical advice for individual conditions or treatments  Talk to your doctor, nurse or pharmacist before following any medical regimen to see if it is safe and effective for you

## 2020-11-13 ENCOUNTER — OFFICE VISIT (OUTPATIENT)
Dept: INTERNAL MEDICINE CLINIC | Age: 20
End: 2020-11-13
Payer: MEDICARE

## 2020-11-13 VITALS
HEART RATE: 112 BPM | TEMPERATURE: 97.7 F | WEIGHT: 179.8 LBS | HEIGHT: 69 IN | SYSTOLIC BLOOD PRESSURE: 120 MMHG | OXYGEN SATURATION: 98 % | DIASTOLIC BLOOD PRESSURE: 80 MMHG | BODY MASS INDEX: 26.63 KG/M2

## 2020-11-13 DIAGNOSIS — F90.2 ADHD (ATTENTION DEFICIT HYPERACTIVITY DISORDER), COMBINED TYPE: ICD-10-CM

## 2020-11-13 DIAGNOSIS — Z23 ENCOUNTER FOR IMMUNIZATION: Primary | ICD-10-CM

## 2020-11-13 DIAGNOSIS — F84.5 ASPERGER SYNDROME: ICD-10-CM

## 2020-11-13 DIAGNOSIS — F84.9 PERVASIVE DEVELOPMENTAL DISORDER, RESIDUAL: ICD-10-CM

## 2020-11-13 PROCEDURE — 90686 IIV4 VACC NO PRSV 0.5 ML IM: CPT

## 2020-11-13 PROCEDURE — G0008 ADMIN INFLUENZA VIRUS VAC: HCPCS

## 2020-11-13 PROCEDURE — 99213 OFFICE O/P EST LOW 20 MIN: CPT | Performed by: INTERNAL MEDICINE

## 2020-11-23 ENCOUNTER — TELEPHONE (OUTPATIENT)
Dept: INTERNAL MEDICINE CLINIC | Age: 20
End: 2020-11-23

## 2020-11-23 DIAGNOSIS — F84.9 PERVASIVE DEVELOPMENTAL DISORDER, RESIDUAL: ICD-10-CM

## 2020-11-23 DIAGNOSIS — F84.0 AUTISTIC DISORDER, ACTIVE: ICD-10-CM

## 2020-11-23 DIAGNOSIS — F84.5 ASPERGER SYNDROME: ICD-10-CM

## 2020-11-23 DIAGNOSIS — F90.2 ADHD (ATTENTION DEFICIT HYPERACTIVITY DISORDER), COMBINED TYPE: Primary | ICD-10-CM

## 2020-11-24 ENCOUNTER — TELEPHONE (OUTPATIENT)
Dept: PSYCHIATRY | Facility: CLINIC | Age: 20
End: 2020-11-24

## 2021-01-19 ENCOUNTER — TELEPHONE (OUTPATIENT)
Dept: PSYCHIATRY | Facility: CLINIC | Age: 21
End: 2021-01-19

## 2021-01-19 NOTE — TELEPHONE ENCOUNTER
Dad called and would like to set up an apt for Mariza Jaziel he said a good time to contact him today is before 12:00 if not today wed, thurs, and fri he is off so you can contact him anytime  Can you please give him a call thank you

## 2021-01-20 ENCOUNTER — TELEPHONE (OUTPATIENT)
Dept: NEUROLOGY | Facility: CLINIC | Age: 21
End: 2021-01-20

## 2021-01-20 ENCOUNTER — TELEPHONE (OUTPATIENT)
Dept: PSYCHIATRY | Facility: CLINIC | Age: 21
End: 2021-01-20

## 2021-01-20 DIAGNOSIS — F84.5 ASPERGER SYNDROME: ICD-10-CM

## 2021-01-20 DIAGNOSIS — F90.2 ADHD (ATTENTION DEFICIT HYPERACTIVITY DISORDER), COMBINED TYPE: Primary | ICD-10-CM

## 2021-01-20 DIAGNOSIS — F84.0 AUTISTIC DISORDER, ACTIVE: ICD-10-CM

## 2021-01-20 NOTE — TELEPHONE ENCOUNTER
Spoke to father Rob stanton,    Had to have pt authorize continued conversation - no Comm Consent is completed  Dad wanted to sched Neuro Consult  Ref from [address] [facility] PCP is:  "Encounter for immunization"    This is not a dx that we can schedule from  Told him to call the PCP and have them adjust the dx and then c/b for scheduling    Based on PCP notes - looks like he would be coming in for Tourettes

## 2021-01-20 NOTE — TELEPHONE ENCOUNTER
Dad called and left a voice message that he would like to set up an apt for Bala Call can you please give him a call thank you

## 2021-01-20 NOTE — TELEPHONE ENCOUNTER
----- Message from Reuel Hatchet sent at 1/19/2021  9:58 AM EST -----  Regarding: Returning call  Father l/juan miguel he was returning your call for his autistic son  He said he was given a non working number  Please call [phone]      Thank you

## 2021-01-20 NOTE — TELEPHONE ENCOUNTER
Behavorial Health Outpatient Intake Questions    Referred by: PCP     Please advised interviewee that they need to answer all questions truthfully to allow for best care and any misrepresentations of information may affect their ability to be seen at this clinic   => Was this discussed? Yes     [facility] -     Presenting Problem (in patient's words): Patient's father reports patient is being transferred [facility] due to age, and needs to see neuro  Patient's father was told he needs to be seen by psych first  Patient is autistic, ADHD, asbergers, PPD  Currently taking Risperidone and Strattera  Father was given the info for neuro so he can reach out to schedule with them also, but patient will need to be seen for medication management for the time being  Are there any developmental disabilities? ? If yes, can they speak to you on the phone? If they are too limited to speak to you on phone, refer out Yes    Are you taking any psychiatric medications? Yes    => If yes, who prescribes? If yes, are they injectable medications? Risperidone and Straterra     Does the patient have a language barrier or hearing impairment? No  Patient has comprehension trouble and loses his train of thought, but can communicate  Have you been treated at [address] [place] [address] by a therapist or a doctor in the past? If yes, who? No    Has the patient been hospitalized for mental health? No   If yes, how long ago was last hospitalization and where was it? Do you actively use alcohol or marijuana or illegal substances? If yes, what and how much - refer out to Drug and alcohol treatment if use is excessive or daily use of illegal substances No concerns of substance abuse are reported  Do you have a community treatment team or ? No    Legal History-     Does the patient have any history of arrests, longterm/group home time, or DUIs? No  If Yes-  1) What types of charges?   2) When were they last incarcerated? 3) Are they currently on parole or probation? Minor Child-    Who has custody of the child? Is there a custody agreement? If there is a custody agreement remind parent that they must bring a copy to the first appt or they will not be seen  Intake Team, please check with provider before scheduling if flags come up such as:  - complex case  - legal history (other than DUI)  - communication barrier concerns are present  - if, in your judgment, this needs further review    ACCEPTED as a patient Yes  => Appointment Date: 4/1/21 at 10:00 am with Dr Carmela Mendoza? No    Name of Insurance Co: [employer]   Insurance ID# [id number] Phone #  If ins is primary or secondary  If patient is a minor, parents information such as Name, D  O B of guarantor

## 2021-03-04 ENCOUNTER — OFFICE VISIT (OUTPATIENT)
Dept: INTERNAL MEDICINE CLINIC | Age: 21
End: 2021-03-04
Payer: MEDICARE

## 2021-03-04 VITALS
SYSTOLIC BLOOD PRESSURE: 112 MMHG | TEMPERATURE: 97.8 F | HEART RATE: 78 BPM | HEIGHT: 69 IN | WEIGHT: 178 LBS | RESPIRATION RATE: 18 BRPM | BODY MASS INDEX: 26.36 KG/M2 | DIASTOLIC BLOOD PRESSURE: 64 MMHG

## 2021-03-04 DIAGNOSIS — F84.5 ASPERGER SYNDROME: Primary | ICD-10-CM

## 2021-03-04 DIAGNOSIS — R11.11 NON-INTRACTABLE VOMITING WITHOUT NAUSEA: ICD-10-CM

## 2021-03-04 PROCEDURE — 99213 OFFICE O/P EST LOW 20 MIN: CPT | Performed by: INTERNAL MEDICINE

## 2021-03-04 RX ORDER — OMEPRAZOLE 20 MG/1
20 CAPSULE, DELAYED RELEASE ORAL
Qty: 30 CAPSULE | Refills: 5 | Status: SHIPPED | OUTPATIENT
Start: 2021-03-04 | End: 2021-08-23 | Stop reason: SDUPTHER

## 2021-03-04 NOTE — PROGRESS NOTES
Assessment/Plan:    Non-intractable vomiting without nausea   Patient has having difficulty with vomiting/ regurgitating/clearing his throat after eating that is becoming increasingly a problem  In the past it has been felt that his throat clearing was part of his whole behavioral abnormalities  Will try adding omeprazole for now and consult in GI to scope him to make sure there is no physical issues such as a stricture and or gastroparesis       Diagnoses and all orders for this visit:    Asperger syndrome    Non-intractable vomiting without nausea  -     Ambulatory referral to Gastroenterology; Future  -     omeprazole (PriLOSEC) 20 mg delayed release capsule; Take 1 capsule (20 mg total) by mouth daily before breakfast          Subjective:      Patient ID: Nai Chapa is a 24 y o  male  As part of his behavior of abnormalities with the Asperger's he does frequent throat clearing but this has now progressed to frequent vomiting of fluid and some stomach contents  He has no other complaints such is indigestion abdominal pain or bloating but he does have chronic constipation which is unchanged    Vomiting   This is a recurrent problem  The current episode started more than 1 month ago  The problem occurs intermittently  The problem has been gradually worsening  The emesis has an appearance of stomach contents  There has been no fever  Associated symptoms include weight loss  Pertinent negatives include no abdominal pain, arthralgias, chest pain, chills, coughing, diarrhea, dizziness, fever, headaches or myalgias  Risk factors:  no risk factors  He has tried diet change and increased fluids for the symptoms  The treatment provided mild relief  Review of Systems   Constitutional: Positive for weight loss  Negative for chills, fatigue, fever and unexpected weight change     HENT: Negative for congestion, ear pain, hearing loss, postnasal drip, sinus pressure, sore throat, trouble swallowing and voice change  Eyes: Negative for visual disturbance  Respiratory: Negative for cough, chest tightness, shortness of breath and wheezing  Cardiovascular: Negative for chest pain, palpitations and leg swelling  Gastrointestinal: Positive for constipation and vomiting  Negative for abdominal distention, abdominal pain, anal bleeding, blood in stool, diarrhea and nausea  Endocrine: Negative for cold intolerance, polydipsia, polyphagia and polyuria  Genitourinary: Negative for dysuria, flank pain, frequency, hematuria and urgency  Musculoskeletal: Negative for arthralgias, back pain, gait problem, joint swelling, myalgias and neck pain  Skin: Negative for rash  Allergic/Immunologic: Negative for immunocompromised state  Neurological: Negative for dizziness, syncope, facial asymmetry, weakness, light-headedness, numbness and headaches  Hematological: Negative for adenopathy  Psychiatric/Behavioral: Positive for behavioral problems  Negative for confusion, sleep disturbance and suicidal ideas  Objective:      /64   Pulse 78   Temp 97 8 °F (36 6 °C)   Resp 18   Ht 5' 9" (1 753 m)   Wt 80 7 kg (178 lb)   BMI 26 29 kg/m²          Physical Exam  Constitutional:       General: He is not in acute distress  Appearance: He is well-developed  HENT:      Right Ear: External ear normal       Left Ear: External ear normal       Nose: Nose normal       Mouth/Throat:      Pharynx: No oropharyngeal exudate  Eyes:      Pupils: Pupils are equal, round, and reactive to light  Neck:      Musculoskeletal: Normal range of motion and neck supple  Thyroid: No thyromegaly  Vascular: No JVD  Cardiovascular:      Rate and Rhythm: Normal rate and regular rhythm  Heart sounds: Normal heart sounds  No murmur  No gallop  Pulmonary:      Effort: Pulmonary effort is normal  No respiratory distress  Breath sounds: Normal breath sounds  No wheezing or rales     Abdominal: General: Bowel sounds are normal  There is no distension  Palpations: Abdomen is soft  There is no mass  Tenderness: There is no abdominal tenderness  Musculoskeletal: Normal range of motion  General: No tenderness  Lymphadenopathy:      Cervical: No cervical adenopathy  Skin:     Findings: No rash  Neurological:      General: No focal deficit present  Mental Status: He is alert and oriented to person, place, and time  Mental status is at baseline  Cranial Nerves: No cranial nerve deficit  Coordination: Coordination normal       Gait: Gait normal    Psychiatric:         Mood and Affect: Mood normal          Behavior: Behavior normal          Thought Content:  Thought content normal          Judgment: Judgment normal

## 2021-03-04 NOTE — ASSESSMENT & PLAN NOTE
Patient has having difficulty with vomiting/ regurgitating/clearing his throat after eating that is becoming increasingly a problem  In the past it has been felt that his throat clearing was part of his whole behavioral abnormalities     Will try adding omeprazole for now and consult in GI to scope him to make sure there is no physical issues such as a stricture and or gastroparesis

## 2021-03-04 NOTE — PATIENT INSTRUCTIONS
Cyclic Vomiting Syndrome   WHAT YOU NEED TO KNOW:   What is cyclic vomiting syndrome? Cyclic vomiting syndrome is a condition that causes you to vomit many times in a row for no known reason  You first have a sense that an episode is about to start  This is followed by nausea, and vomiting that can continue for hours to days  This may happen over and over for several days  Then you have no nausea or vomiting for weeks or months before the cycle starts again  What other signs and symptoms may I have? · Retching or heaving (vomiting but nothing comes out), or gagging    · Feeling dizzy or tired    · Sensitivity to light or sound    · Abdominal pain, loss of appetite, or diarrhea    · A fever    What causes or increases my risk for cyclic vomiting syndrome? · Migraine headaches, or your mother had migraines    · An anxiety disorder    · A problem with your digestive system or your nervous system    · A hormone imbalance    · Using large amounts of marijuana    How is cyclic vomiting syndrome diagnosed and treated? · No test is available to diagnose cyclic vomiting syndrome  Your healthcare provider will examine you and ask about your symptoms  He or she will ask if you have had 3 or more episodes in the past year  Your provider will ask if you or anyone in your family has cyclic vomiting syndrome or migraines  Tests may be used to rule out medical conditions that can cause vomiting  An endoscopy is a test that uses a scope to see your stomach and intestines  CT or MRI scans may be used to check for blockages or other problems  A motility test checks how well food moves through your digestive system  · Your symptoms may be controlled with medicines  Medicines may be used to prevent or stop migraine headaches  This may be given if you have a history of migraines or are at risk because of a family history of migraines  Medicine may also be used to reduce the amount of acid your stomach makes   You may also be given medicine to reduce nausea  What can I do to prevent or manage episodes? · Avoid triggers  Certain foods can trigger episodes, such as chocolate, cheese, and monosodium glutamate (MSG)  Caffeine can also trigger an episode  Your healthcare provider or dietitian can help you identify foods that trigger an episode  This will help you create meal plans to avoid those triggers  Other triggers include too much exercise, motion sickness, or being in hot weather too long  · Drink more liquids as directed  Vomiting can lead to dehydration  It is important to drink more liquids to help replace lost body fluids  Ask your healthcare provider how much liquid to drink each day and which liquids are best for you  Your provider may recommend that you drink an oral rehydration solution (ORS)  ORS contains water, salts, and sugar that are needed to replace the lost body fluids  Ask what kind of ORS to use, how much to drink, and where to get it  · Eat smaller meals, more often  Eat small amounts of food every 2 to 3 hours, even if you are not hungry  Food in your stomach may decrease your nausea  · Control stress  Stress or anxiety can trigger an episode  Find ways to relax and manage your stress  Get more rest and sleep  · Do not drink alcohol  Alcohol may upset or irritate your stomach  Too much alcohol can also cause nausea and vomiting  · Do not use marijuana (cannabis)  Repeated use of marijuana over a long period of time (chronic use) can cause episodes  This is called cannabis hyperemesis syndrome  If you have an episode caused by marijuana use, a hot shower may relieve your symptoms  Ask your healthcare provider for information if want to quit using marijuana and need help quitting  When should I seek immediate care? · You see blood in your vomit or your bowel movements  · You have sudden, severe pain in your chest and upper abdomen after hard vomiting or retching      · You have swelling in your neck and chest      · You are dizzy, cold, and thirsty, and your eyes and mouth are dry  · You are urinating very little or not at all  · You have muscle weakness, leg cramps, and trouble breathing  · Your heart is beating much faster than normal     · You continue to vomit for more than 48 hours  When should I contact my healthcare provider? · You have frequent dry heaves (vomiting but nothing comes out)  · You have questions or concerns about your condition or care  CARE AGREEMENT:   You have the right to help plan your care  Learn about your health condition and how it may be treated  Discuss treatment options with your healthcare providers to decide what care you want to receive  You always have the right to refuse treatment  The above information is an  only  It is not intended as medical advice for individual conditions or treatments  Talk to your doctor, nurse or pharmacist before following any medical regimen to see if it is safe and effective for you  © Copyright [address] Information is for End User's use only and may not be sold, redistributed or otherwise used for commercial purposes   All illustrations and images included in CareNotes® are the copyrighted property of A D A M , Inc  or [address] [place] [address]

## 2021-03-04 NOTE — ASSESSMENT & PLAN NOTE
Patient has longstanding Asperger's and behavioral issues and still retry receives treatment for same  Statement Selected

## 2021-04-01 ENCOUNTER — OFFICE VISIT (OUTPATIENT)
Dept: PSYCHIATRY | Facility: CLINIC | Age: 21
End: 2021-04-01
Payer: MEDICARE

## 2021-04-01 DIAGNOSIS — F41.9 ANXIETY: ICD-10-CM

## 2021-04-01 DIAGNOSIS — F90.2 ADHD (ATTENTION DEFICIT HYPERACTIVITY DISORDER), COMBINED TYPE: ICD-10-CM

## 2021-04-01 DIAGNOSIS — F84.9 PERVASIVE DEVELOPMENTAL DISORDER, RESIDUAL: ICD-10-CM

## 2021-04-01 DIAGNOSIS — F84.0 AUTISM SPECTRUM DISORDER: Primary | ICD-10-CM

## 2021-04-01 DIAGNOSIS — F84.5 ASPERGER SYNDROME: ICD-10-CM

## 2021-04-01 PROCEDURE — 90792 PSYCH DIAG EVAL W/MED SRVCS: CPT | Performed by: STUDENT IN AN ORGANIZED HEALTH CARE EDUCATION/TRAINING PROGRAM

## 2021-04-01 RX ORDER — ATOMOXETINE 80 MG/1
80 CAPSULE ORAL DAILY
Qty: 30 CAPSULE | Refills: 2 | Status: SHIPPED | OUTPATIENT
Start: 2021-04-01 | End: 2021-06-21 | Stop reason: SDUPTHER

## 2021-04-01 NOTE — BH TREATMENT PLAN
TREATMENT PLAN (Medication Management Only)        [facility]    Name and Date of Birth:  Caitlin Thorpe 24 y o  2000  Date of Treatment Plan: April 1, 2021  Diagnosis/Diagnoses:    1  Autism spectrum disorder    2  ADHD (attention deficit hyperactivity disorder), combined type    3  Asperger syndrome    4  Pervasive developmental disorder, residual    5  Anxiety      Strengths/Personal Resources for Self-Care: supportive family, supportive friends, taking medications as prescribed, ability to communicate needs, ability to listen, ability to understand psychiatric illness, family ties, good physical health, motivation for treatment, ability to negotiate basic needs, being resoureceful, sense of humor, special hobby/interest, willingness to work on problems  Area/Areas of need (in own words): ADHD symptoms, completing tasks at work, family conflict - "I want to get a new job and continue to work on skills" - be less combative with sister   1  Long Term Goal: improve impulse control  Target Date:1 year - 4/1/2022  Person/Persons responsible for completion of goal: Haider Umaña, father  2  Short Term Objective (s) - How will we reach this goal?:   A  Provider new recommended medication/dosage changes and/or continue medication(s): continue current medications as prescribed  B  Attend medication management appointments regularly  C  Take psychiatric medications responsibly  D  Continue to engage in behavioral activation - seeing [facility] assistant 3x weekly  E  Avoid excessive interactions with sister - improve relationship  F   Find employment via [employer] by summer 2021   Target Date:6 months - 10/1/2021  Person/Persons Responsible for Completion of Goal: Haider Umaña, father  Progress Towards Goals: continuing treatment  Treatment Modality: medication management every 3 months  Review due 180 days from date of this plan: 6 months - 10/1/2021  Expected length of service: ongoing treatment  My Physician/PA/NP and I have developed this plan together and I agree to work on the goals and objectives  I understand the treatment goals that were developed for my treatment  Treatment Plan completed with assistance and input from patient and verbal consent provided  Treatment plan was not signed at time of office visit secondary to COVID-19 social distancing guidelines

## 2021-04-01 NOTE — PSYCH
[address]    Name and Date of Birth:  Corry Garcia 24 y o  2000 MRN: [id number]    Date of Visit: April 1, 2021    Reason for visit: Full psychiatric intake assessment for medication management     HPI     Corry Garcia is a 24 y o  male with a past psychiatric history significant for Autism Spectrum Disorder, ADHD, and PDD who presents to the [address] [place] [address] outpatient clinic for intake assessment  Milton Jaimes presents alongside his father, Brittni Bush, who assists with history gathering  Milton Jaimes is pleasant, cooperative, and calm  He responds to verbal redirecting  His thoughts are logical, linear, and reality-based  With assistance from his father, he completes assessment without difficulty  Milton Jaimes has been involved in psychiatric/neurologic treatment since the age of 10 and has been seeing Dr Grace Doan at [address] [place] [address] for the last 15 years  Milton Jaimes has been trialed on numerous psychotropic medications, including Risperdal, Straterra, Klonopin, and Ritalin  He endorses ongoing compliance with current medication regimen that consists of Risperdal 1mg AM, 2mg PM and Straterra 80mg Daily  Father reports that Milton Jaimes was excessively hyper, impulsive, and dysregulated during periods of psychostimulant use in the past, so these agents should be avoided  Milton Jaimes is currently in the process of being linked with Neurology as well secondary to PDD and ASD  Milton Jaimes has a history of IEP/504 plans, ritualistic behaviors, and is often rigid and inflexible in his cognitive struggle  Father reports a pervasive difficulty with social interaction and emotional reciprocity  Milton Jaimes is currently involved in numerous community-based resources to assist with life skills and to foster greater autonomy and independence  He meets with Jenney Castleman from [facility] and Pedro Murdock () frequently and finds pleasure in this   Father denies recent behavioral outbursts or physical aggression  Michael Hernandez' sister, who also has ASD, appears to be a major trigger for Michael Hernandez  However, father denies physical altercations  Acutely, Michael Hernandez denies neurovegetative symptomatology suggestive of major depressive disorder or dysthymia  Michael Hernandez denies fragmented or non-restorative sleep  He states that he is sleeping at least 7 hours per evening  Michael Hernandez endorses robust appetite, baseline energy, and no impairment of motivation  Michael Hernandez reports difficulties with concentration/memory at times and endorses forgetfulness and inattentiveness, likely rooted in ADHD and not depression  Michael Hernandez does not experience daily crying spells or limited pleasure in activities previously found pleasurable  He continues to enjoy online engagement via [employer] and going to local [place] to [place]  Michael Hernandez adamantly denies acute thoughts of suicide or self-harm  Michael Hernandez has no plans to harm others  There is no documented history of prior suicidal gestures or suicidal attempts  Michael Hernandez denies historical non-suicidal self injurious behavior  Michael Hernandez is future-oriented and demonstrates self preservation as evidenced by today's evaluation in which Michael Hernandez is seeking psychiatric intervention to improve overall mental health and outlook on life  Michael Hernandez denies a pervasive history of worthlessness, hopelessness, or guilt  Michael Hernandez vehemently denies any acute or chronic history suggestive of an underlying affective (bipolar) organization  Michael Hernandez denies previous episodes of elevated/expansive mood, lengthy periods without sleep, grandiosity, or intense and prolonged irritability  Michael Hernandez denies atypical periods of increased goal-directed behavior, excessive spending, or sexual promiscuity  During today's evaluation, Michael Hernandez does not exhibit objective evidence of hypomania/cassie  Michael Hernandez is mostly organized in thought without flight of ideas or loosening of associations   Speech does not appear to be pressured or rapid and Ruthy Soler responds well to verbal redirecting  Ruthy Soler denies historical symptomatology suggestive of an underlying psychotic process  Ruthy Soler does not currently endorse acute perceptual disturbances such as A/V hallucinations, paranoia, referential ideation, or delusions  Ruthy Soler denies acute and chronic Schneiderian symptoms, including: thought-broadcasting, thought-insertion, thought-withdrawal or audible thoughts  During today's evaluation, Ruthy Soler does not exhibit objective evidence of ace psychosis as the patient does not appear internally preoccupied or easily distracted  Bryon's thoughts are organized, linear, and reality-based  Ruthy Soler denies historical symptomatology suggestive of PTSD, OCD, or disordered eating  Current Rating Scores:     Unable to complete today due to PDD, ASD      Psychiatric Review Of Systems:    Sleep changes: no  Appetite changes: no  Weight changes: yes, weight gain  Energy/anergy: no  Interest/pleasure/anhedonia: no  Somatic symptoms: no  Anxiety/panic: no  Keiry: no  Guilty/hopeless: no  Self injurious behavior/risky behavior: no  Suicidal ideation: no  Homicidal ideation: no  Auditory hallucinations: no  Visual hallucinations: no  Other hallucinations: no  Delusional thinking: no  Eating disorder history: no  Obsessive/compulsive symptoms: no    Review Of Systems:    Constitutional recent weight gain (5 lbs)   ENT negative   Cardiovascular negative   Respiratory negative   Gastrointestinal abdominal discomfort   Genitourinary negative   Musculoskeletal negative   Integumentary negative   Neurological negative   Endocrine negative   Other Symptoms none, all other systems are negative       Family Psychiatric History:     Family History   Problem Relation Age of Onset    Lung cancer Mother     Diabetes Father     Alcohol abuse Father         in sustained remission    ADD / ADHD Sister     Autism Sister          Past Psychiatric History:     Inpatient psychiatric admissions: Denies  Prior outpatient psychiatric linkage: Previously linked with Dr Pinky Rinaldi via [facility]  Past/current psychotherapy: Denies - Is actively involved in [facility] with assistant Taz who assists with life skills and counseling  History of suicidal attempts/gestures: Denies  History of violence/aggressive behaviors: No overt physical aggression but history of irritability and agitation with sister   Psychotropic medication trials: Klonopin, Ritalin (too stimulated, hyperactive), Risperdal, Strattera   Substance abuse inpatient/outpatient rehabilitation: Denies    Substance Abuse History:    No history of ETOH, illict substance, or tobacco abuse  No past legal actions or arrests secondary to substance intoxication  The patient denies prior DWIs/DUIs  No history of outpatient/inpatient rehabilitation programs  Yang Lane does not exhibit objective evidence of substance withdrawal during today's examination nor does Yang Lane appear under the influence of any psychoactive substance  Social History:    Developmental: Documented history of milestone/developmental delay  Yang Lane was born full-term via vaginal birth  No birth complications noted  Denies a history of in-utero exposure to toxins/illicit substances  There a no documented history of IEP and need for special education  Full-scale IQ is unknown at the moment  Yang Lane has been actively involved in treatment since the age of 10 secondary to ADHD, ASD, and PDD    Education: high school diploma/GED  Marital history: single  Living arrangement, social support: father and sister (25years old, named Richard) - mother passed away in 2010 secondary to terminal illness (cancer)  Occupational History: Works part-time at [employer] in [address]  to see employment via [employer] this summer   Access to firearms: Father reports that there are firearms in the hope but denies that [employer] has direct access to these weapons/firearms  Wu Loera has no history of arrests or violence with a deadly weapon  Traumatic History:     Abuse:none is reported  Other Traumatic Events: Denies    Past Medical History:    Past Medical History:   Diagnosis Date    ADHD (attention deficit hyperactivity disorder)     Autism spectrum disorder     GERD (gastroesophageal reflux disease)      No past medical history pertinent negatives  Past Surgical History:   Procedure Laterality Date    NO PAST SURGERIES       No Known Allergies    History Review: The following portions of the patient's history were reviewed and updated as appropriate: allergies, current medications, past family history, past medical history, past social history, past surgical history and problem list     OBJECTIVE:    Vital signs in last 24 hours: There were no vitals filed for this visit      Mental Status Evaluation:    Appearance age appropriate, casually dressed, dressed appropriately, looks stated age   Behavior pleasant, cooperative, calm, limited eye contact   Speech normal rate, normal volume, normal pitch, scant   Mood normal, euthymic   Affect constricted   Thought Processes organized, logical, linear, concrete   Associations concrete associations   Thought Content no overt delusions   Perceptual Disturbances: no auditory hallucinations, no visual hallucinations   Abnormal Thoughts  Risk Potential Suicidal ideation - None  Homicidal ideation - None  Potential for aggression - Yes, due to poor impulse control   Orientation oriented to person, place, time/date and situation   Memory recent and remote memory grossly intact   Consciousness alert and awake   Attention Span Concentration Span attention span and concentration appear shorter than expected for age   Intellect appears to be of average intelligence   Insight fair   Judgement fair   Muscle Strength and  Gait normal muscle strength and normal muscle tone, normal gait and normal balance Motor Activity no abnormal movements   Language no difficulty naming common objects, no difficulty repeating a phrase   Fund of Knowledge adequate knowledge of current events  adequate fund of knowledge regarding past history  adequate fund of knowledge regarding vocabulary    Pain none   Pain Scale 0       Laboratory Results: I have personally reviewed all pertinent laboratory/tests results    Recent Labs (last 2 months):   No visits with results within 2 Month(s) from this visit  Latest known visit with results is:   Appointment on 02/27/2016   Component Date Value    Cholesterol 02/27/2016 103     Triglycerides 02/27/2016 29     HDL, Direct 02/27/2016 51     LDL Calculated 02/27/2016 46     Glucose, Fasting 02/27/2016 89        Suicide/Homicide Risk Assessment:    Risk of Harm to Self:  The following ratings are based on assessment at the time of the interview and review of records  Demographic risk factors include: , never , male, age: young adult (15-24)  Historical Risk Factors include: history of impulsive behaviors  Recent Specific Risk Factors include: none  Protective Factors: no current suicidal ideation, ability to adapt to change, access to mental health treatment, compliant with medications, compliant with mental health treatment, connection to community, contact with caregivers, effective coping skills, effective decision-making skills, effective problem solving skills, good health, good self-esteem, having a desire to be alive, having a sense of purpose or meaning in life, healthy fear of risky behaviors and pain, medical compliance, no substance use problems, opportunities to contribute to community, opportunities to participate in community, personal beliefs about the meaning and value of life, stable living environment, stable job, sense of importance of health and wellness, supportive family, supportive friends  Weapons: handgun   The following steps have been taken to ensure weapons are properly secured: locked, no access, no bullets  Based on today's assessment, ALEXIA presents the following risk of harm to self: low    Risk of Harm to Others: The following ratings are based on assessment at the time of the interview and review of records  Demographic Risk Factors include: male, 14-21 years of age  Historical Risk Factors include: none  Recent Specific Risk Factors include: none  Protective Factors: no current homicidal ideation  Weapons: handgun  The following steps have been taken to ensure weapons are properly secured: locked, no access, no bullets  Based on today's assessment, ALEXIA presents the following risk of harm to others: none    The following interventions are recommended: no intervention changes needed  Acutely, Bryon's lethality risk is LOW  ALEXIA is future-oriented, forward-thinking, and demonstrates ability to act in a self-preserving manner as evidenced by volitionally presenting to the clinic today, seeking treatment  He is adherent to current psychotropic medication regimen and denies adverse side effects  Additionally, ALEXIA sits throughout the assessment wearing personal protective gear (ie mask) in the context of an ongoing viral pandemic, suggesting a will and desire to live  He lists father as main protective factor against self-harm  He has no history of substance abuse  At this juncture, inpatient hospitalization is not currently warranted  To mitigate future risk, patient should adhere to treatment recommendations, avoid alcohol/illicit substance use, utilize community-based resources and familiar support, and prioritize mental health treatment  Assessment/Plan:     Rex Casanova is a 24 y o  male with a past psychiatric history significant for Autism Spectrum Disorder, ADHD, and PDD who presents to the [address] [place] [address] outpatient clinic for intake assessment   ALEXIA presents alongside his father, Destiny Tellez, who assists with history gathering  Amy Evans has been involved in psychiatric/neurologic treatment since the age of 10 and has been seeing Dr Shellie Bass at [address] [facility] for the last 15 years  Amy Evans has been trialed on numerous psychotropic medications, including Risperdal, Straterra, Klonopin, and Ritalin  He endorses ongoing compliance with current medication regimen that consists of Risperdal 1mg AM, 2mg PM and Straterra 80mg Daily  Father reports that Amy Evans was excessively hyper, impulsive, and dysregulated during periods of psychostimulant use in the past, so these agents should be avoided  Amy Evans is currently in the process of being linked with Neurology as well secondary to PDD and ASD  Amy Evans has a history of IEP/504 plans, ritualistic behaviors, and is often rigid and inflexible in his cognitive struggle  Father reports a pervasive difficulty with social interaction and emotional reciprocity  Amy Evans is currently involved in numerous community-based resources to assist with life skills and to foster greater autonomy and independence  He meets with Mustapha Sweeney from [facility] and Marygrace Boast () frequently and finds pleasure in this  Father denies recent behavioral outbursts or physical aggression  Amy Evans' sister, who also has ASD, appears to be a major trigger for Amy Evans  However, father denies physical altercations  Acutely, Amy Evans denies neurovegetative symptomatology suggestive of major depressive disorder or dysthymia  Amy Evans vehemently denies any acute or chronic history suggestive of an underlying affective (bipolar) organization  Amy Evans denies historical symptomatology suggestive of an underlying psychotic process  Amy Evasn denies historical symptomatology suggestive of PTSD, OCD, or disordered eating  Psychopharmacologically, given ongoing mood stability and behavioral control with Straterra and Risperdal, no acute interventions are warranted   Risks/benefits/alternativies to treatment discussed, including a myriad of potential adverse medication side effects, to which Dylon Luis  And father voiced understanding and consented fully to treatment          DSM-V Diagnoses:     1 ) Autism Spectrum Disorder  2 ) ADHD  3 ) PDD by history       Treatment Recommendations/Precautions:      1 ) Autism Spectrum Disorder  - Continue Risperdal 1mg AM, 2mg PM secondary to agitation and irritability associated with ASD  - Continue engagement in weekly socialization with Taz via [employer] - learning life skills, etc    - Psychoeducation provided regarding the importance of exercise and healthy dietary choices and their impact on mood, energy, and motivation  - Counseled to avoid ETOH, illict substances, and nicotine secondary to the detrimental effects of these substances on mental and physical health  - Encouraged to engage in non-verbal forms of therapy such as art therapy, music therapy, and mindfulness    2 ) ADHD  - Continue Straterra 80mg Daily   - Hx of paradoxical excitation with psychostimulant use (Ritalin) - avoid   - Continue engagement in weekly socialization with Taz via [employer] - learning life skills, etc    - Psychoeducation provided regarding the importance of exercise and healthy dietary choices and their impact on mood, energy, and motivation  - Counseled to avoid ETOH, illict substances, and nicotine secondary to the detrimental effects of these substances on mental and physical health  - Encouraged to engage in non-verbal forms of therapy such as art therapy, music therapy, and mindfulness      3 ) PDD by history   - Continue Risperdal 1mg AM, 2mg PM secondary to agitation and irritability associated with ASD  - Continue engagement in weekly socialization with Taz via [employer] - learning life skills, etc    - Psychoeducation provided regarding the importance of exercise and healthy dietary choices and their impact on mood, energy, and motivation  - Counseled to avoid ETOH, illict substances, and nicotine secondary to the detrimental effects of these substances on mental and physical health  - Encouraged to engage in non-verbal forms of therapy such as art therapy, music therapy, and mindfulness      Medication management every 3 months  Follows with family physician for glucose and lipid monitoring due to current therapy with antipsychotic medication  Aware of need to follow up with family physician for medical issues  Aware of 24 hour and weekend coverage for urgent situations accessed by calling [facility] main practice number    Medications Risks/Benefits:      Risks, Benefits And Possible Side Effects Of Medications:    Risks, benefits, and possible side effects of medications explained to ALEXIA including risk of parkinsonian symptoms, Tardive Dyskinesia and metabolic syndrome related to treatment with antipsychotic medications and risks of cardiovascular side effects including elevated blood pressure, risk of misuse, abuse or dependence and risk of increased anxiety related to treatment with stimulant medications  He verbalizes understanding and agreement for treatment      Controlled Medication Discussion:     No recent records found for controlled prescriptions according to [place] [facility] Drug Monitoring [facility]    Treatment Plan:    Completed and signed during the session: Yes - Treatment Plan done but not signed at time of office visit due to:  Plan reviewed in person and verbal consent given due to Yoko social alma rosa Avila MD 04/01/21

## 2021-04-02 ENCOUNTER — IMMUNIZATIONS (OUTPATIENT)
Dept: FAMILY MEDICINE CLINIC | Facility: HOSPITAL | Age: 21
End: 2021-04-02

## 2021-04-02 DIAGNOSIS — Z23 ENCOUNTER FOR IMMUNIZATION: Primary | ICD-10-CM

## 2021-04-02 PROCEDURE — 0001A SARS-COV-2 / COVID-19 MRNA VACCINE (PFIZER-BIONTECH) 30 MCG: CPT

## 2021-04-02 PROCEDURE — 91300 SARS-COV-2 / COVID-19 MRNA VACCINE (PFIZER-BIONTECH) 30 MCG: CPT

## 2021-04-14 ENCOUNTER — CONSULT (OUTPATIENT)
Dept: GASTROENTEROLOGY | Facility: AMBULARY SURGERY CENTER | Age: 21
End: 2021-04-14
Payer: MEDICARE

## 2021-04-14 VITALS
SYSTOLIC BLOOD PRESSURE: 118 MMHG | WEIGHT: 180 LBS | BODY MASS INDEX: 26.66 KG/M2 | HEIGHT: 69 IN | DIASTOLIC BLOOD PRESSURE: 62 MMHG

## 2021-04-14 DIAGNOSIS — R13.10 DYSPHAGIA, UNSPECIFIED TYPE: ICD-10-CM

## 2021-04-14 DIAGNOSIS — R11.11 NON-INTRACTABLE VOMITING WITHOUT NAUSEA: Primary | ICD-10-CM

## 2021-04-14 PROCEDURE — 99204 OFFICE O/P NEW MOD 45 MIN: CPT | Performed by: INTERNAL MEDICINE

## 2021-04-14 NOTE — PROGRESS NOTES
Consultation - [address] [facility]  Kieran Roche 2000 male         Chief Complaint:  Difficulty swallowing, vomiting    HPI:   24 old male with history of autism has been having chronic issues with vomiting after meal sometimes  Patient was brought in by his father  Patient complains about food getting stuck in the chest and then he vomits back  Denies any nausea  He also has chronic cough with rasping voice  Denies any cough or other symptoms at night and he sleeps well throughout the night  He was started on omeprazole about a month ago without any significant relief  Patient denies any heartburn acid reflux  Good appetite, no recent weight loss  No abdominal pain  Regular bowel movements and denies any blood or mucus in the stool  REVIEW OF SYSTEMS: Review of Systems   Constitutional: Negative for activity change, appetite change, chills, diaphoresis, fatigue, fever and unexpected weight change  HENT: Negative for ear discharge, ear pain, facial swelling, hearing loss, nosebleeds, sore throat, tinnitus and voice change  Eyes: Negative for pain, discharge, redness, itching and visual disturbance  Respiratory: Negative for apnea, cough, chest tightness, shortness of breath and wheezing  Cardiovascular: Negative for chest pain and palpitations  Gastrointestinal:        As noted in HPI   Endocrine: Negative for cold intolerance, heat intolerance and polyuria  Genitourinary: Negative for difficulty urinating, dysuria, flank pain, hematuria and urgency  Musculoskeletal: Negative for arthralgias, back pain, gait problem, joint swelling and myalgias  Skin: Negative for rash and wound  Neurological: Negative for dizziness, tremors, seizures, speech difficulty, light-headedness, numbness and headaches  Hematological: Negative for adenopathy  Does not bruise/bleed easily  Psychiatric/Behavioral: Negative for agitation, behavioral problems and confusion   The patient is not nervous/anxious  Past Medical History:   Diagnosis Date    ADHD (attention deficit hyperactivity disorder)     Autism spectrum disorder     GERD (gastroesophageal reflux disease)       Past Surgical History:   Procedure Laterality Date    NO PAST SURGERIES       Social History     Socioeconomic History    Marital status: Single     Spouse name: Not on file    Number of children: Not on file    Years of education: Not on file    Highest education level: Not on file   Occupational History    Not on file   Social Needs    Financial resource strain: Not on file    Food insecurity     Worry: Not on file     Inability: Not on file    Transportation needs     Medical: Not on file     Non-medical: Not on file   Tobacco Use    Smoking status: Never Smoker    Smokeless tobacco: Never Used   Substance and Sexual Activity    Alcohol use: No    Drug use: Never    Sexual activity: Not on file   Lifestyle    Physical activity     Days per week: Not on file     Minutes per session: Not on file    Stress: Not on file   Relationships    Social connections     Talks on phone: Not on file     Gets together: Not on file     Attends Baptism service: Not on file     Active member of club or organization: Not on file     Attends meetings of clubs or organizations: Not on file     Relationship status: Not on file    Intimate partner violence     Fear of current or ex partner: Not on file     Emotionally abused: Not on file     Physically abused: Not on file     Forced sexual activity: Not on file   Other Topics Concern    Not on file   Social History Narrative    Not on file     Family History   Problem Relation Age of Onset    Lung cancer Mother     Diabetes Father     Alcohol abuse Father         in sustained remission    ADD / ADHD Sister     Autism Sister     Cancer Paternal Grandmother      Patient has no known allergies    Current Outpatient Medications   Medication Sig Dispense Refill    atomoxetine (STRATTERA) 80 MG capsule Take 1 capsule (80 mg total) by mouth daily 30 capsule 2    MULTIPLE VITAMINS-MINERALS ER PO Take by mouth      omeprazole (PriLOSEC) 20 mg delayed release capsule Take 1 capsule (20 mg total) by mouth daily before breakfast 30 capsule 5    risperiDONE (RisperDAL) 1 mg tablet Take 1 tablet (1 mg total) by mouth 3 (three) times a day 90 tablet 3     No current facility-administered medications for this visit  Blood pressure 118/62, height 5' 9" (1 753 m), weight 81 6 kg (180 lb)  PHYSICAL EXAM: Physical Exam  Constitutional:       Appearance: He is well-developed  HENT:      Head: Normocephalic and atraumatic  Eyes:      General: No scleral icterus  Right eye: No discharge  Left eye: No discharge  Conjunctiva/sclera: Conjunctivae normal       Pupils: Pupils are equal, round, and reactive to light  Neck:      Musculoskeletal: Neck supple  Thyroid: No thyromegaly  Vascular: No JVD  Trachea: No tracheal deviation  Cardiovascular:      Rate and Rhythm: Normal rate and regular rhythm  Heart sounds: Normal heart sounds  No murmur  No friction rub  No gallop  Pulmonary:      Effort: Pulmonary effort is normal  No respiratory distress  Breath sounds: Normal breath sounds  No wheezing or rales  Chest:      Chest wall: No tenderness  Abdominal:      General: Bowel sounds are normal  There is no distension  Palpations: Abdomen is soft  There is no mass  Tenderness: There is no abdominal tenderness  There is no guarding or rebound  Hernia: No hernia is present  Lymphadenopathy:      Cervical: No cervical adenopathy  Skin:     General: Skin is warm and dry  Findings: No erythema or rash  Neurological:      Mental Status: He is alert and oriented to person, place, and time  Psychiatric:         Behavior: Behavior normal          Thought Content:  Thought content normal           No results found for: WBC, HGB, HCT, MCV, PLT  No results found for: GLUCOSE, CALCIUM, NA, K, CO2, CL, BUN, CREATININE  No results found for: ALT, AST, GGT, ALKPHOS, BILITOT  No results found for: INR, PROTIME    No results found  ASSESSMENT & PLAN:    Dysphagia    Possible from Schatzki's ring or peptic stricture  Rule out eosinophilic esophagitis  -  Advised to chew well before swallowing    - schedule for EGD    - Patient and his father were explained about  the risks and benefits of the procedure  Risks including but not limited to bleeding, infection, perforation were explained in detail  Also explained about less than 100% sensitivity with the exam and other alternatives  Non-intractable vomiting without nausea    It appears that patient is having episodes of vomiting after the food gets stuck  Doubt for any significant gastric pathology  Rule out H pylori gastritis      -  Schedule for EGD

## 2021-04-14 NOTE — ASSESSMENT & PLAN NOTE
It appears that patient is having episodes of vomiting after the food gets stuck  Doubt for any significant gastric pathology  Rule out H pylori gastritis      -  Schedule for EGD

## 2021-04-14 NOTE — ASSESSMENT & PLAN NOTE
Possible from Schatzki's ring or peptic stricture  Rule out eosinophilic esophagitis  -  Advised to chew well before swallowing    - schedule for EGD    - Patient and his father were explained about  the risks and benefits of the procedure  Risks including but not limited to bleeding, infection, perforation were explained in detail  Also explained about less than 100% sensitivity with the exam and other alternatives

## 2021-04-24 ENCOUNTER — IMMUNIZATIONS (OUTPATIENT)
Dept: FAMILY MEDICINE CLINIC | Facility: HOSPITAL | Age: 21
End: 2021-04-24

## 2021-04-24 DIAGNOSIS — Z23 ENCOUNTER FOR IMMUNIZATION: Primary | ICD-10-CM

## 2021-04-24 PROCEDURE — 0002A SARS-COV-2 / COVID-19 MRNA VACCINE (PFIZER-BIONTECH) 30 MCG: CPT

## 2021-04-24 PROCEDURE — 91300 SARS-COV-2 / COVID-19 MRNA VACCINE (PFIZER-BIONTECH) 30 MCG: CPT

## 2021-06-09 ENCOUNTER — TELEPHONE (OUTPATIENT)
Dept: GASTROENTEROLOGY | Facility: AMBULARY SURGERY CENTER | Age: 21
End: 2021-06-09

## 2021-06-10 ENCOUNTER — ANESTHESIA (OUTPATIENT)
Dept: GASTROENTEROLOGY | Facility: AMBULARY SURGERY CENTER | Age: 21
End: 2021-06-10

## 2021-06-10 ENCOUNTER — HOSPITAL ENCOUNTER (OUTPATIENT)
Dept: GASTROENTEROLOGY | Facility: AMBULARY SURGERY CENTER | Age: 21
Setting detail: OUTPATIENT SURGERY
Discharge: HOME | End: 2021-06-10
Attending: INTERNAL MEDICINE | Admitting: INTERNAL MEDICINE
Payer: MEDICARE

## 2021-06-10 ENCOUNTER — ANESTHESIA EVENT (OUTPATIENT)
Dept: GASTROENTEROLOGY | Facility: AMBULARY SURGERY CENTER | Age: 21
End: 2021-06-10

## 2021-06-10 VITALS
BODY MASS INDEX: 23.1 KG/M2 | HEART RATE: 85 BPM | RESPIRATION RATE: 20 BRPM | WEIGHT: 165 LBS | OXYGEN SATURATION: 97 % | DIASTOLIC BLOOD PRESSURE: 59 MMHG | TEMPERATURE: 98 F | SYSTOLIC BLOOD PRESSURE: 117 MMHG | HEIGHT: 71 IN

## 2021-06-10 DIAGNOSIS — R13.10 DYSPHAGIA, UNSPECIFIED TYPE: ICD-10-CM

## 2021-06-10 DIAGNOSIS — R11.11 NON-INTRACTABLE VOMITING WITHOUT NAUSEA: ICD-10-CM

## 2021-06-10 PROCEDURE — 88305 TISSUE EXAM BY PATHOLOGIST: CPT | Performed by: PATHOLOGY

## 2021-06-10 PROCEDURE — 43239 EGD BIOPSY SINGLE/MULTIPLE: CPT | Performed by: INTERNAL MEDICINE

## 2021-06-10 RX ORDER — ONDANSETRON 2 MG/ML
4 INJECTION INTRAMUSCULAR; INTRAVENOUS ONCE AS NEEDED
Status: CANCELLED | OUTPATIENT
Start: 2021-06-10

## 2021-06-10 RX ORDER — PROPOFOL 10 MG/ML
INJECTION, EMULSION INTRAVENOUS AS NEEDED
Status: DISCONTINUED | OUTPATIENT
Start: 2021-06-10 | End: 2021-06-10

## 2021-06-10 RX ORDER — SODIUM CHLORIDE, SODIUM LACTATE, POTASSIUM CHLORIDE, CALCIUM CHLORIDE 600; 310; 30; 20 MG/100ML; MG/100ML; MG/100ML; MG/100ML
125 INJECTION, SOLUTION INTRAVENOUS CONTINUOUS
Status: DISCONTINUED | OUTPATIENT
Start: 2021-06-10 | End: 2021-06-14 | Stop reason: HOSPADM

## 2021-06-10 RX ORDER — SODIUM CHLORIDE, SODIUM LACTATE, POTASSIUM CHLORIDE, CALCIUM CHLORIDE 600; 310; 30; 20 MG/100ML; MG/100ML; MG/100ML; MG/100ML
20 INJECTION, SOLUTION INTRAVENOUS CONTINUOUS
Status: CANCELLED | OUTPATIENT
Start: 2021-06-10

## 2021-06-10 RX ADMIN — PROPOFOL 30 MG: 10 INJECTION, EMULSION INTRAVENOUS at 08:42

## 2021-06-10 RX ADMIN — PROPOFOL 50 MG: 10 INJECTION, EMULSION INTRAVENOUS at 08:41

## 2021-06-10 RX ADMIN — PROPOFOL 150 MG: 10 INJECTION, EMULSION INTRAVENOUS at 08:40

## 2021-06-10 RX ADMIN — SODIUM CHLORIDE, SODIUM LACTATE, POTASSIUM CHLORIDE, AND CALCIUM CHLORIDE: .6; .31; .03; .02 INJECTION, SOLUTION INTRAVENOUS at 08:35

## 2021-06-10 NOTE — DISCHARGE INSTRUCTIONS
Upper Endoscopy   WHAT YOU NEED TO KNOW:   An upper endoscopy is also called an upper gastrointestinal (GI) endoscopy, or an esophagogastroduodenoscopy (EGD)  You may feel bloated, gassy, or have some abdominal discomfort after your procedure  Your throat may be sore for 24 to 36 hours  You may burp or pass gas from air that is still inside your body  DISCHARGE INSTRUCTIONS:   Call 911 for any of the following:   · You have sudden chest pain or trouble breathing  Seek care immediately if:   · You feel dizzy or faint  · You have trouble swallowing  · Your bowel movements are very dark or black  · Your abdomen is hard and firm and you have severe pain  · You vomit blood  Contact your healthcare provider if:   · You feel full or bloated and cannot burp or pass gas  · You have not had a bowel movement for 3 days after your procedure  · You have neck pain  · You have a fever or chills  · You have nausea or are vomiting  · You have a rash or hives  · You have questions or concerns about your endoscopy  Relieve a sore throat:  Suck on throat lozenges or crushed ice  Gargle with a small amount of warm salt water  Mix 1 teaspoon of salt and 1 cup of warm water to make salt water  Relieve gas and discomfort from bloating:  Lie on your right side with a heating pad on your abdomen  Take short walks to help pass gas  Eat small meals until bloating is relieved  Rest after your procedure: You have been given medicine to relax you  Do not  drive or make important decisions until the day after your procedure  Return to your normal activity as directed  You can usually return to work the day after your procedure  Follow up with your healthcare provider as directed:  Write down your questions so you remember to ask them during your visits     © 2017 [address] is for End User's use only and may not be sold, redistributed or otherwise used for commercial purposes  All illustrations and images included in CareNotes® are the copyrighted property of [employer]  or Sabino Stanford  The above information is an  only  It is not intended as medical advice for individual conditions or treatments  Talk to your doctor, nurse or pharmacist before following any medical regimen to see if it is safe and effective for you  Hiatal Hernia   WHAT YOU NEED TO KNOW:   A hiatal hernia is a condition that causes part of your stomach to bulge through the hiatus (small opening) in your diaphragm  The part of the stomach may move up and down, or it may get trapped above the diaphragm  DISCHARGE INSTRUCTIONS:   Seek care immediately if:   · You have severe abdominal pain  · You try to vomit but nothing comes out (retching)  · You have severe chest pain and sudden trouble breathing  · Your bowel movements are black or bloody  · Your vomit looks like coffee grounds or has blood in it  Contact your healthcare provider if:   · Your symptoms are getting worse  · You have nausea, and you are vomiting  · You are losing weight without trying  · You have questions or concerns about your condition or care  Medicines:   · Medicines  may be given to relieve heartburn symptoms  These medicines help to decrease or block stomach acid  You may also be given medicines that help to tighten the esophageal sphincter  · Take your medicine as directed  Contact your healthcare provider if you think your medicine is not helping or if you have side effects  Tell him or her if you are allergic to any medicine  Keep a list of the medicines, vitamins, and herbs you take  Include the amounts, and when and why you take them  Bring the list or the pill bottles to follow-up visits  Carry your medicine list with you in case of an emergency      Follow up with your healthcare provider as directed:  Write down your questions so you remember to ask them during your visits  Self care:   · Avoid foods that make your symptoms worse  These may include spicy foods, fruit juices, alcohol, caffeine, chocolate, and mint  · Eat several small meals during the day  Small meals give your stomach less food to digest     · Avoid lying down and bending forward after you eat  Do not eat meals 2 to 3 hours before bedtime  This decreases your risk for reflux  · Maintain a healthy weight  If you are overweight, weight loss may help relieve your symptoms  · Sleep with your head elevated  at least 6 inches  · Do not smoke  Smoking can increase your symptoms of heartburn  © Copyright [address] Information is for End User's use only and may not be sold, redistributed or otherwise used for commercial purposes  All illustrations and images included in CareNotes® are the copyrighted property of [employer]  or Formerly named [facility] Lele Gracia   The above information is an  only  It is not intended as medical advice for individual conditions or treatments  Talk to your doctor, nurse or pharmacist before following any medical regimen to see if it is safe and effective for you

## 2021-06-10 NOTE — ANESTHESIA PREPROCEDURE EVALUATION
Procedure:  EGD    Relevant Problems   GI/HEPATIC   (+) Dysphagia      NEURO/PSYCH   (+) Anxiety      Other   (+) ADHD (attention deficit hyperactivity disorder), combined type   (+) Autism spectrum disorder        Physical Exam    Airway    Mallampati score: III  TM Distance: >3 FB  Neck ROM: full     Dental   No notable dental hx     Cardiovascular      Pulmonary      Other Findings        Anesthesia Plan  ASA Score- 2     Anesthesia Type- IV sedation with anesthesia with ASA Monitors  Additional Monitors:   Airway Plan:           Plan Factors-    Chart reviewed  Existing labs reviewed  Patient summary reviewed  Induction- intravenous  Postoperative Plan-     Informed Consent- Anesthetic plan and risks discussed with patient and mother  I personally reviewed this patient with the CRNA  Discussed and agreed on the Anesthesia Plan with the CRNA  Vonda Yuan

## 2021-06-10 NOTE — H&P
History and Physical -  [facility]  Harinder Johns 24 y o  male MRN: [id number]        HPI:  50-year-old male with history of GERD has issues with episodes of vomiting and difficulty swallowing  Historical Information   Past Medical History:   Diagnosis Date    ADHD (attention deficit hyperactivity disorder)     Autism spectrum disorder     GERD (gastroesophageal reflux disease)      Past Surgical History:   Procedure Laterality Date    NO PAST SURGERIES       Social History   Social History     Substance and Sexual Activity   Alcohol Use No     Social History     Substance and Sexual Activity   Drug Use Never     Social History     Tobacco Use   Smoking Status Never Smoker   Smokeless Tobacco Never Used     Family History   Problem Relation Age of Onset    Lung cancer Mother     Diabetes Father     Alcohol abuse Father         in sustained remission    ADD / ADHD Sister     Autism Sister     Cancer Paternal Grandmother        Meds/Allergies     (Not in a hospital admission)      No Known Allergies    Objective     There were no vitals taken for this visit      PHYSICAL EXAM:    Gen: NAD  CV: S1 & S2 normal, RRR  CHEST: Clear to auscultate  ABD: soft, NT/ND, good bowel sounds  EXT: no edema    ASSESSMENT:     Dysphagia, vomiting    PLAN:    EGD

## 2021-06-14 DIAGNOSIS — F41.9 ANXIETY: ICD-10-CM

## 2021-06-14 DIAGNOSIS — F90.2 ADHD (ATTENTION DEFICIT HYPERACTIVITY DISORDER), COMBINED TYPE: ICD-10-CM

## 2021-06-14 RX ORDER — RISPERIDONE 1 MG/1
1 TABLET, FILM COATED ORAL 3 TIMES DAILY
Qty: 90 TABLET | Refills: 3
Start: 2021-06-14 | End: 2021-06-18 | Stop reason: SDUPTHER

## 2021-06-17 ENCOUNTER — TELEPHONE (OUTPATIENT)
Dept: GASTROENTEROLOGY | Facility: AMBULARY SURGERY CENTER | Age: 21
End: 2021-06-17

## 2021-06-17 NOTE — TELEPHONE ENCOUNTER
Spoke with pt's father, consent form is not signed  Could not relay results  Advised to have pt call office back

## 2021-06-17 NOTE — TELEPHONE ENCOUNTER
----- Message from Anastasiia Elizabeth MD sent at 6/15/2021  4:58 PM EDT -----  All the biopsies came back as benign    Repeat EGD in 3 years

## 2021-06-18 ENCOUNTER — TELEPHONE (OUTPATIENT)
Dept: PSYCHIATRY | Facility: CLINIC | Age: 21
End: 2021-06-18

## 2021-06-18 DIAGNOSIS — F41.9 ANXIETY: ICD-10-CM

## 2021-06-18 DIAGNOSIS — F90.2 ADHD (ATTENTION DEFICIT HYPERACTIVITY DISORDER), COMBINED TYPE: ICD-10-CM

## 2021-06-18 RX ORDER — RISPERIDONE 1 MG/1
1 TABLET, FILM COATED ORAL 3 TIMES DAILY
Qty: 90 TABLET | Refills: 3
Start: 2021-06-18 | End: 2021-06-21 | Stop reason: SDUPTHER

## 2021-06-18 NOTE — TELEPHONE ENCOUNTER
Father is at the pharmacy  Rx that was ordered yesterday did not go through  Messaged Dr Johnny Live through teams to please resend it

## 2021-06-21 DIAGNOSIS — F90.2 ADHD (ATTENTION DEFICIT HYPERACTIVITY DISORDER), COMBINED TYPE: ICD-10-CM

## 2021-06-21 RX ORDER — ATOMOXETINE 80 MG/1
80 CAPSULE ORAL DAILY
Qty: 30 CAPSULE | Refills: 2 | Status: CANCELLED | OUTPATIENT
Start: 2021-06-21
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
## 2021-06-21 NOTE — TELEPHONE ENCOUNTER
Pharmacy still hasn't received script   They gave him enough to hold him over for the weekend but he needs a new script as soon as possible

## 2021-06-21 NOTE — TELEPHONE ENCOUNTER
6/21 @ 9:36 Father called requesting script for RISPERDAL  be resent to pharmacy and a new script for [facility]  be sent also    Confirmed with pharmacy states they did not receive original sent on 6/18

## 2021-06-22 ENCOUNTER — TELEPHONE (OUTPATIENT)
Dept: GASTROENTEROLOGY | Facility: AMBULARY SURGERY CENTER | Age: 21
End: 2021-06-22

## 2021-06-22 NOTE — TELEPHONE ENCOUNTER
----- Message from Rudolph Colvin MD sent at 6/15/2021  4:58 PM EDT -----  All the biopsies came back as benign    Repeat EGD in 3 years

## 2021-06-22 NOTE — TELEPHONE ENCOUNTER
Spoke with pt and pt's father, aware of results and verbalized understanding  Pt's father was asking about if he should avoid chocolate and dairy? Also was asking about pt's hernia, if there was anything that needed to be done? Please advise  Thank you!

## 2021-06-22 NOTE — TELEPHONE ENCOUNTER
In regards to chocolate or dairy, patient should try to limit this, especially if it seems to exacerbate his symptoms  The hiatal hernia is very small, less then 1 cm  This is unlikely to be causing any issues and we typically do nothing for this unless patient is symptomatic and the hernia is larger than 4-5 cm

## 2021-06-22 NOTE — TELEPHONE ENCOUNTER
Spoke with Jayme Lynn, pt's father, aware of recommendations  Advised to call office with any questions or concerns

## 2021-07-20 DIAGNOSIS — F90.2 ADHD (ATTENTION DEFICIT HYPERACTIVITY DISORDER), COMBINED TYPE: ICD-10-CM

## 2021-07-20 RX ORDER — ATOMOXETINE 80 MG/1
80 CAPSULE ORAL DAILY
Qty: 30 CAPSULE | Refills: 2 | OUTPATIENT
Start: 2021-07-20

## 2021-07-20 NOTE — TELEPHONE ENCOUNTER
Marshall Fuentes has 2 active refills for Strattera and thus, no need for new scripts to be sent  He should contact his pharmacy directly and request this medication to be filled

## 2021-08-23 DIAGNOSIS — R11.11 NON-INTRACTABLE VOMITING WITHOUT NAUSEA: ICD-10-CM

## 2021-08-23 RX ORDER — OMEPRAZOLE 20 MG/1
20 CAPSULE, DELAYED RELEASE ORAL
Qty: 30 CAPSULE | Refills: 5 | Status: SHIPPED | OUTPATIENT
Start: 2021-08-23 | End: 2022-02-21 | Stop reason: SDUPTHER

## 2021-09-01 ENCOUNTER — OFFICE VISIT (OUTPATIENT)
Dept: PSYCHIATRY | Facility: CLINIC | Age: 21
End: 2021-09-01
Payer: MEDICARE

## 2021-09-01 DIAGNOSIS — F84.9 PERVASIVE DEVELOPMENTAL DISORDER, RESIDUAL: ICD-10-CM

## 2021-09-01 DIAGNOSIS — F90.2 ADHD (ATTENTION DEFICIT HYPERACTIVITY DISORDER), COMBINED TYPE: ICD-10-CM

## 2021-09-01 DIAGNOSIS — F84.0 AUTISM SPECTRUM DISORDER: Primary | ICD-10-CM

## 2021-09-01 DIAGNOSIS — F41.9 ANXIETY: ICD-10-CM

## 2021-09-01 PROCEDURE — 99213 OFFICE O/P EST LOW 20 MIN: CPT | Performed by: STUDENT IN AN ORGANIZED HEALTH CARE EDUCATION/TRAINING PROGRAM

## 2021-09-01 PROCEDURE — 90833 PSYTX W PT W E/M 30 MIN: CPT | Performed by: STUDENT IN AN ORGANIZED HEALTH CARE EDUCATION/TRAINING PROGRAM

## 2021-09-01 RX ORDER — ATOMOXETINE 80 MG/1
80 CAPSULE ORAL DAILY
Qty: 90 CAPSULE | Refills: 1 | Status: SHIPPED | OUTPATIENT
Start: 2021-09-01 | End: 2021-12-01 | Stop reason: SDUPTHER

## 2021-09-01 RX ORDER — RISPERIDONE 1 MG/1
1 TABLET, FILM COATED ORAL 3 TIMES DAILY
Qty: 270 TABLET | Refills: 1
Start: 2021-09-01 | End: 2021-09-20 | Stop reason: SDUPTHER

## 2021-09-01 NOTE — BH TREATMENT PLAN
TREATMENT PLAN (Medication Management Only)        [facility]    Name and Date of Birth:  Jer Van 24 y o  2000  Date of Treatment Plan: September 1, 2021  Diagnosis/Diagnoses:    1  Autism spectrum disorder    2  Pervasive developmental disorder, residual    3  Anxiety    4  ADHD (attention deficit hyperactivity disorder), combined type      Strengths/Personal Resources for Self-Care: supportive family, taking medications as prescribed, ability to adapt to life changes, ability to communicate needs, ability to listen, ability to reason, good physical health, independence, ability to negotiate basic needs, sense of humor, special hobby/interest, stable employment, willingness to work on problems  Area/Areas of need (in own words): attention and concentration problems, ADHD symptoms, behavioral problems  1  Long Term Goal: maintain control of ADHD symptoms, maintain control of behavior   Target Date:6 months - 3/1/2022  Person/Persons responsible for completion of goal: Ngoc Hand  2  Short Term Objective (s) - How will we reach this goal?:   A  Provider new recommended medication/dosage changes and/or continue medication(s): continue current medications as prescribed  B  Attend medication management appointments regularly  C  Take psychiatric medications responsibly  D Continue active involvement with [facility]  E  Find new job by October 2021  F  Spend more time creating video content  G  Learn to be more independent   Target Date:6 months - 3/1/2022  Person/Persons Responsible for Completion of Goal: Ngoc Hand  Progress Towards Goals: stable, continuing treatment  Treatment Modality: medication management every 3 months  Review due 180 days from date of this plan: 6 months - 3/1/2022  Expected length of service: ongoing treatment  My Physician/PA/NP and I have developed this plan together and I agree to work on the goals and objectives   I understand the treatment goals that were developed for my treatment  Treatment Plan completed with assistance and input from patient and verbal consent provided  Treatment plan was not signed at time of office visit secondary to COVID-19 social distancing guidelines

## 2021-09-01 NOTE — PSYCH
MEDICATION MANAGEMENT NOTE        [facility]      Name and Date of Birth:  Brandy Hubbard 24 y o  2000 MRN: [id number]    Date of Visit: September 1, 2021    Reason for Visit: Follow-up visit for medication management     SUBJECTIVE:    Brandy Hubbard is a 24 y o  male with past psychiatric history significant for ASD, ADHD, and PDD who was personally seen and evaluated today at the [address] [place] [address] E outpatient clinic for follow-up and medication management  Gagan Georges presents alongside his father who assists with history gathering  Gagan Georges is pleasant and cooperative  He completes assessment without difficulty  Gagan Georges endorses ongoing compliance with Straterra and Risperdal  He denies adverse medication side effects  Gagan Georges states that he is in the process of seeking new employment but is having difficulty locating jobs secondary to the current pandemic  Gagan Georges endorses adequate sleep and healthy appetite  He denies daily crying spells or anhedonia  He continues to find pleasure in watching movies and creating video content  He speaks at length about a script that he has completed regarding his [place]  Gagan Georges endorses robust energy and motivation  He is without lethality concern  He does not report SI/HI nor does he have any plans to harm himself or others  Gagan Georges remains future-oriented, detailing plans to watch the new Space Jam movie when it is released in October  Gagan Georges is without hopelessness, worthlessness or guilt  Acutely, he does not report pathologic anxiety or new onset panic symptomatology  His concentration and focus is adequate with Straterra  He is able to stay organized (cognitively) at work and at home  Bryon's frustration tolerance is low, likely rooted in ASD and PDD  Supportive therapy, cognitive re-structuring, and joint problem solving techniques were utilized today   He and his father were receptive  Luisa Mario will work on more appropriately expressing his emotions rather than yelling  Luisa Mario currently denies symptomatology suggestive of cassie/hypomania  He is without overt psychosis  He appears psychiatrically stable and both patient and father agree that no pharmacologic intervention is warranted at this juncture  They were agreeable to follow-up in 3 months  They offered no further concerns  Current Rating Scores:     None completed today  Review Of Systems:      Constitutional recent weight gain (5 lbs) and as noted in HPI   ENT negative   Cardiovascular negative   Respiratory negative   Gastrointestinal abdominal discomfort, heartburn and recently underwent EGD   Genitourinary negative   Musculoskeletal negative   Integumentary negative   Neurological negative   Endocrine negative   Other Symptoms none, all other systems are negative       Past Psychiatric History: (unchanged information from previous note copied and italicized) - Information that is bolded has been updated  Inpatient psychiatric admissions: Denies  Prior outpatient psychiatric linkage: Previously linked with Dr Camden Mckeon via [facility]  Past/current psychotherapy: Denies - Is actively involved in [facility] with assistant Tamera Machado who assists with life skills and counseling  History of suicidal attempts/gestures: Denies  History of violence/aggressive behaviors: No overt physical aggression but history of irritability and agitation with sister   Psychotropic medication trials: Klonopin, Ritalin (too stimulated, hyperactive), Risperdal, Strattera   Substance abuse inpatient/outpatient rehabilitation: Denies     Substance Abuse History: (unchanged information from previous note copied and italicized) - Information that is bolded has been updated       No history of ETOH, illict substance, or tobacco abuse  No past legal actions or arrests secondary to substance intoxication  The patient denies prior DWIs/DUIs   No history of outpatient/inpatient rehabilitation programs  Jose M Garduno does not exhibit objective evidence of substance withdrawal during today's examination nor does Jose M Garduno appear under the influence of any psychoactive substance           Social History: (unchanged information from previous note copied and italicized) - Information that is bolded has been updated       Developmental: Documented history of milestone/developmental delay  Jose M Garduno was born full-term via vaginal birth  No birth complications noted  Denies a history of in-utero exposure to toxins/illicit substances  There a no documented history of IEP and need for special education  Full-scale IQ is unknown at the moment  Jose M Garduno has been actively involved in treatment since the age of 10 secondary to ADHD, ASD, and PDD  Education: high school diploma/GED  Marital history: single  Living arrangement, social support: father and sister (25years old, named Richard) - mother passed away in 2010 secondary to terminal illness (cancer)  Occupational History: Works part-time at [address] [place] [address] to see employment via [employer] this summer   Access to firearms: Father reports that there are firearms in the hope but denies that Jose M Garduno has direct access to these weapons/firearms  Darci Sharif has no history of arrests or violence with a deadly weapon         Traumatic History: (unchanged information from previous note copied and italicized) - Information that is bolded has been updated  Abuse:none is reported  Other Traumatic Events: Denies      Past Medical History:    Past Medical History:   Diagnosis Date    ADHD (attention deficit hyperactivity disorder)     Autism spectrum disorder     GERD (gastroesophageal reflux disease)      No past medical history pertinent negatives    Past Surgical History:   Procedure Laterality Date    NO PAST SURGERIES       No Known Allergies    Substance Abuse History:    Social History     Substance and Sexual Activity   Alcohol Use No     Social History     Substance and Sexual Activity   Drug Use Never       Social History:    Social History     Socioeconomic History    Marital status: Single     Spouse name: Not on file    Number of children: Not on file    Years of education: Not on file    Highest education level: Not on file   Occupational History    Not on file   Tobacco Use    Smoking status: Never Smoker    Smokeless tobacco: Never Used   Substance and Sexual Activity    Alcohol use: No    Drug use: Never    Sexual activity: Not on file   Other Topics Concern    Not on file   Social History Narrative    Not on file     Social Determinants of Health     Financial Resource Strain:     Difficulty of Paying Living Expenses:    Food Insecurity:     Worried About Running Out of Food in the Last Year:     [address] in the Last Year:    Transportation Needs:     Lack of Transportation (Medical):  Lack of Transportation (Non-Medical):    Physical Activity:     Days of Exercise per Week:     Minutes of Exercise per Session:    Stress:     Feeling of Stress :    Social Connections:     Frequency of Communication with Friends and Family:     Frequency of Social Gatherings with Friends and Family:     Attends Taoist Services:     Active Member of Clubs or Organizations:     Attends Club or Organization Meetings:     Marital Status:    Intimate Partner Violence:     Fear of Current or Ex-Partner:     Emotionally Abused:     Physically Abused:     Sexually Abused:        Family Psychiatric History:     Family History   Problem Relation Age of Onset    Lung cancer Mother     Diabetes Father     Alcohol abuse Father         in sustained remission    ADD / ADHD Sister     Autism Sister     Cancer Paternal Grandmother     No Known Problems Brother     No Known Problems Sister     No Known Problems Sister     No Known Problems Sister        History Review:  The following portions of the patient's history were reviewed and updated as appropriate: allergies, current medications, past family history, past medical history, past social history, past surgical history and problem list          OBJECTIVE:     Vital signs in last 24 hours: There were no vitals filed for this visit  Mental Status Evaluation:    Appearance age appropriate, casually dressed, looks stated age   Behavior pleasant, cooperative, calm   Speech normal rate, loud   Mood normal, euthymic   Affect constricted   Thought Processes organized, logical, goal directed   Associations intact associations   Thought Content no overt delusions   Perceptual Disturbances: no auditory hallucinations, no visual hallucinations   Abnormal Thoughts  Risk Potential Suicidal ideation - None at present  Homicidal ideation - None at present  Potential for aggression - No   Orientation oriented to person, place, time/date and situation   Memory recent and remote memory grossly intact   Consciousness alert and awake   Attention Span Concentration Span attention span and concentration are age appropriate   Intellect not formally assessed   Insight fair   Judgement intact and good   Muscle Strength and  Gait normal gait and normal balance   Motor activity no abnormal movements   Language no difficulty naming common objects, no difficulty repeating a phrase   Fund of Knowledge adequate knowledge of current events  adequate fund of knowledge regarding past history  adequate fund of knowledge regarding vocabulary    Pain none   Pain Scale 0       Laboratory Results: I have personally reviewed all pertinent laboratory/tests results    Recent Labs (last 2 months):   No visits with results within 2 Month(s) from this visit     Latest known visit with results is:   Hospital Outpatient Visit on 06/10/2021   Component Date Value    Case Report 06/10/2021                      Value:Surgical Pathology Report                         Case: [id number] Authorizing Provider:  Miguel Kaba MD          Collected:           06/10/2021 0843              Ordering Location:     [address] [place] [address]        Received:            06/10/2021 [address]                                                    Pathologist:           Cande Barnett MD                                                              Specimens:   A) - Stomach, cold bx: gastric                                                                      B) - Duodenum, cold bx: duodenum                                                                    C) - Esophagus, cold bx: esophagus                                                                  D) - Esophagus, cold bx:distal esophagus                                                   Final Diagnosis 06/10/2021                      Value: This result contains rich text formatting which cannot be displayed here   Note 06/10/2021                      Value: This result contains rich text formatting which cannot be displayed here   Additional Information 06/10/2021                      Value: This result contains rich text formatting which cannot be displayed here  [facility] Gross Description 06/10/2021                      Value: This result contains rich text formatting which cannot be displayed here   Clinical Information 06/10/2021                      Value:R/o: H  Pylori       Suicide/Homicide Risk Assessment:    The following interventions are recommended: no intervention changes needed      Lethality Statement:    Based on today's assessment and clinical criteria, Jackie Car contracts for safety and is not an imminent risk of harm to self or others  Outpatient level of care is deemed appropriate at this current time   [facility] understands that if they can no longer contract for safety, they need to call the office or report to their nearest Emergency Room for immediate evaluation  Assessment/Plan:     Taye Mcbride is a 24 y o  male with past psychiatric history significant for ASD, ADHD, and PDD who was personally seen and evaluated today at the [address] [place] [address] [address] outpatient clinic for follow-up and medication management  [facility] has been involved in psychiatric/neurologic treatment since the age of 10 and has been seeing Dr Niko Ash at [facility] for the last 15 years  [facility] has been trialed on numerous psychotropic medications, including Risperdal, Straterra, Klonopin, and Ritalin  He endorses ongoing compliance with current medication regimen that consists of Risperdal 1mg AM, 2mg PM and Straterra 80mg Daily  Father reports that [facility] was excessively hyper, impulsive, and dysregulated during periods of psychostimulant use in the past, so these agents should be avoided  [facility] is currently in the process of being linked with Neurology as well secondary to PDD and ASD  [facility] has a history of IEP/504 plans, ritualistic behaviors, and is often rigid and inflexible in his cognitive struggle  Father reports a pervasive difficulty with social interaction and emotional reciprocity  [facility] is currently involved in numerous community-based resources to assist with life skills and to foster greater autonomy and independence  He meets with Andrea Piper from [facility] and Sarahi Frausto () frequently and finds pleasure in this  Father denies recent behavioral outbursts or physical aggression  [facility]' sister, who also has ASD, appears to be a major trigger for [facility]  However, father denies physical altercations  [facility] denies historical neurovegetative symptomatology suggestive of major depressive disorder or dysthymia  [facility] vehemently denies any acute or chronic history suggestive of an underlying affective (bipolar) organization  [facility] denies historical symptomatology suggestive of an underlying psychotic process   [facility] denies historical symptomatology suggestive of PTSD, OCD, or disordered eating       Psychopharmacologically, given ongoing mood stability and behavioral control with Straterra and Risperdal  He is without lethality concern   As such, no intervention is warranted at this juncture         DSM-V Diagnoses:      1 ) Autism Spectrum Disorder  2 ) ADHD  3 ) PDD by history         Treatment Recommendations/Precautions:        1 ) Autism Spectrum Disorder  - Continue Risperdal 1mg AM, 2mg PM secondary to agitation and irritability associated with ASD  - Continue engagement in weekly socialization with Taz via [employer] - learning life skills, etc    - Psychoeducation provided regarding the importance of exercise and healthy dietary choices and their impact on mood, energy, and motivation  - Counseled to avoid ETOH, illict substances, and nicotine secondary to the detrimental effects of these substances on mental and physical health  - Encouraged to engage in non-verbal forms of therapy such as art therapy, music therapy, and mindfulness     2 ) ADHD  - Continue Straterra 80mg Daily              - Hx of paradoxical excitation with psychostimulant use (Ritalin) - avoid        3 ) PDD by history   - Continue Risperdal 1mg AM, 2mg PM secondary to agitation and irritability associated with ASD  - Continue engagement in weekly socialization with T[employer] via [employer] - learning life skills, etc    - Psychoeducation provided regarding the importance of exercise and healthy dietary choices and their impact on mood, energy, and motivation  - Counseled to avoid ETOH, illict substances, and nicotine secondary to the detrimental effects of these substances on mental and physical health  - Encouraged to engage in non-verbal forms of therapy such as art therapy, music therapy, and mindfulness          Medication management every 3 months  Continue psychotherapy with own therapist  Aware of need to follow up with family physician for medical issues  Aware of 24 hour and weekend coverage for urgent situations accessed by calling [facility] main practice number    Medications Risks/Benefits      Risks, Benefits And Possible Side Effects Of Medications:    Risks, benefits, and possible side effects of medications explained to ALEXIA including risk of parkinsonian symptoms, Tardive Dyskinesia and metabolic syndrome related to treatment with antipsychotic medications  He verbalizes understanding and agreement for treatment  Controlled Medication Discussion:     No recent records found for controlled prescriptions according to [place] Prescription Drug Monitoring Program    Psychotherapy Provided:     Individual psychotherapy provided: Yes  Counseling was provided during the session today for 17 minutes  Medications, treatment progress and treatment plan reviewed with ALEXIA  Medication education provided to ALEXIA  Recent stressors discussed with ALEXIA including job stress, problems at work, health issues, medical problems, limited support, social difficulties, everyday stressors and occasional anxiety  Coping strategies including compliance with medications, eliminating avoidance, getting into a good routine, maintain healthy diet, maintain heathy sleeping hygiene and stress reduction reviewed with ALEXIA  Educated on importance of medication and treatment compliance  Importance of follow up with family physician for medical issues reviewed with ALEXIA  Supportive therapy provided  Cognitive therapy was utilized during the session  Treatment Plan:    Completed and signed during the session: Yes - Treatment Plan done but not signed at time of office visit due to:  Plan reviewed in person and verbal consent given due to [place] social distancing    Note Share Disclaimer:      This note was not shared with the patient due to reasonable likelihood of causing patient harm    Jesus Beasley MD 09/01/21

## 2021-09-20 DIAGNOSIS — F90.2 ADHD (ATTENTION DEFICIT HYPERACTIVITY DISORDER), COMBINED TYPE: ICD-10-CM

## 2021-09-20 DIAGNOSIS — F41.9 ANXIETY: ICD-10-CM

## 2021-09-20 RX ORDER — RISPERIDONE 1 MG/1
1 TABLET, FILM COATED ORAL 3 TIMES DAILY
Qty: 90 TABLET | Refills: 0
Start: 2021-09-20 | End: 2021-09-21 | Stop reason: SDUPTHER

## 2021-09-20 NOTE — TELEPHONE ENCOUNTER
Spoke with pharmacy and they do not have any refills  Will ask covering provider to review in Dr Lizbeth Narvaez absence   Next appointment  12/1/21

## 2021-09-21 DIAGNOSIS — F41.9 ANXIETY: ICD-10-CM

## 2021-09-21 DIAGNOSIS — F90.2 ADHD (ATTENTION DEFICIT HYPERACTIVITY DISORDER), COMBINED TYPE: ICD-10-CM

## 2021-09-21 RX ORDER — RISPERIDONE 1 MG/1
1 TABLET, FILM COATED ORAL 3 TIMES DAILY
Qty: 90 TABLET | Refills: 0 | Status: SHIPPED | OUTPATIENT
Start: 2021-09-21 | End: 2021-10-25 | Stop reason: SDUPTHER

## 2021-10-25 DIAGNOSIS — F41.9 ANXIETY: ICD-10-CM

## 2021-10-25 DIAGNOSIS — F90.2 ADHD (ATTENTION DEFICIT HYPERACTIVITY DISORDER), COMBINED TYPE: ICD-10-CM

## 2021-10-25 RX ORDER — RISPERIDONE 1 MG/1
1 TABLET, FILM COATED ORAL 3 TIMES DAILY
Qty: 270 TABLET | Refills: 0 | Status: SHIPPED | OUTPATIENT
Start: 2021-10-25 | End: 2021-12-01 | Stop reason: SDUPTHER

## 2021-11-10 ENCOUNTER — OFFICE VISIT (OUTPATIENT)
Dept: INTERNAL MEDICINE CLINIC | Age: 21
End: 2021-11-10
Payer: MEDICARE

## 2021-11-10 VITALS
TEMPERATURE: 98 F | HEIGHT: 71 IN | OXYGEN SATURATION: 98 % | DIASTOLIC BLOOD PRESSURE: 70 MMHG | BODY MASS INDEX: 24.53 KG/M2 | WEIGHT: 175.2 LBS | SYSTOLIC BLOOD PRESSURE: 110 MMHG | HEART RATE: 72 BPM

## 2021-11-10 DIAGNOSIS — Z23 ENCOUNTER FOR IMMUNIZATION: Primary | ICD-10-CM

## 2021-11-10 DIAGNOSIS — Z00.00 MEDICARE ANNUAL WELLNESS VISIT, SUBSEQUENT: ICD-10-CM

## 2021-11-10 DIAGNOSIS — R73.9 HYPERGLYCEMIA: ICD-10-CM

## 2021-11-10 DIAGNOSIS — R13.10 DYSPHAGIA, UNSPECIFIED TYPE: ICD-10-CM

## 2021-11-10 DIAGNOSIS — Z13.220 SCREENING, LIPID: ICD-10-CM

## 2021-11-10 DIAGNOSIS — F90.2 ADHD (ATTENTION DEFICIT HYPERACTIVITY DISORDER), COMBINED TYPE: ICD-10-CM

## 2021-11-10 PROBLEM — R11.11 NON-INTRACTABLE VOMITING WITHOUT NAUSEA: Status: RESOLVED | Noted: 2021-03-04 | Resolved: 2021-11-10

## 2021-11-10 PROCEDURE — 90682 RIV4 VACC RECOMBINANT DNA IM: CPT | Performed by: INTERNAL MEDICINE

## 2021-11-10 PROCEDURE — G0008 ADMIN INFLUENZA VIRUS VAC: HCPCS | Performed by: INTERNAL MEDICINE

## 2021-11-10 PROCEDURE — G0438 PPPS, INITIAL VISIT: HCPCS | Performed by: INTERNAL MEDICINE

## 2021-11-19 ENCOUNTER — APPOINTMENT (OUTPATIENT)
Dept: LAB | Age: 21
End: 2021-11-19
Payer: MEDICARE

## 2021-11-19 DIAGNOSIS — Z13.220 SCREENING, LIPID: ICD-10-CM

## 2021-11-19 DIAGNOSIS — R73.9 HYPERGLYCEMIA: ICD-10-CM

## 2021-11-19 LAB
ALBUMIN SERPL BCP-MCNC: 4.3 G/DL (ref 3.5–5)
ALP SERPL-CCNC: 77 U/L (ref 46–116)
ALT SERPL W P-5'-P-CCNC: 40 U/L (ref 12–78)
ANION GAP SERPL CALCULATED.3IONS-SCNC: 4 MMOL/L (ref 4–13)
AST SERPL W P-5'-P-CCNC: 27 U/L (ref 5–45)
BILIRUB SERPL-MCNC: 0.78 MG/DL (ref 0.2–1)
BUN SERPL-MCNC: 19 MG/DL (ref 5–25)
CALCIUM SERPL-MCNC: 9.1 MG/DL (ref 8.3–10.1)
CHLORIDE SERPL-SCNC: 106 MMOL/L (ref 100–108)
CHOLEST SERPL-MCNC: 123 MG/DL
CO2 SERPL-SCNC: 29 MMOL/L (ref 21–32)
CREAT SERPL-MCNC: 0.93 MG/DL (ref 0.6–1.3)
EST. AVERAGE GLUCOSE BLD GHB EST-MCNC: 105 MG/DL
GFR SERPL CREATININE-BSD FRML MDRD: 116 ML/MIN/1.73SQ M
GLUCOSE P FAST SERPL-MCNC: 94 MG/DL (ref 65–99)
HBA1C MFR BLD: 5.3 %
HDLC SERPL-MCNC: 43 MG/DL
LDLC SERPL CALC-MCNC: 68 MG/DL (ref 0–100)
NONHDLC SERPL-MCNC: 80 MG/DL
POTASSIUM SERPL-SCNC: 4.1 MMOL/L (ref 3.5–5.3)
PROT SERPL-MCNC: 7.8 G/DL (ref 6.4–8.2)
SODIUM SERPL-SCNC: 139 MMOL/L (ref 136–145)
TRIGL SERPL-MCNC: 62 MG/DL

## 2021-11-19 PROCEDURE — 80061 LIPID PANEL: CPT

## 2021-11-19 PROCEDURE — 83036 HEMOGLOBIN GLYCOSYLATED A1C: CPT

## 2021-11-19 PROCEDURE — 36415 COLL VENOUS BLD VENIPUNCTURE: CPT

## 2021-11-19 PROCEDURE — 80053 COMPREHEN METABOLIC PANEL: CPT

## 2021-12-01 ENCOUNTER — OFFICE VISIT (OUTPATIENT)
Dept: PSYCHIATRY | Facility: CLINIC | Age: 21
End: 2021-12-01
Payer: MEDICARE

## 2021-12-01 DIAGNOSIS — F84.9 PERVASIVE DEVELOPMENTAL DISORDER, RESIDUAL: ICD-10-CM

## 2021-12-01 DIAGNOSIS — F90.2 ADHD (ATTENTION DEFICIT HYPERACTIVITY DISORDER), COMBINED TYPE: ICD-10-CM

## 2021-12-01 DIAGNOSIS — F41.9 ANXIETY: ICD-10-CM

## 2021-12-01 DIAGNOSIS — F84.0 AUTISM SPECTRUM DISORDER: Primary | ICD-10-CM

## 2021-12-01 PROCEDURE — 90833 PSYTX W PT W E/M 30 MIN: CPT | Performed by: STUDENT IN AN ORGANIZED HEALTH CARE EDUCATION/TRAINING PROGRAM

## 2021-12-01 PROCEDURE — 99214 OFFICE O/P EST MOD 30 MIN: CPT | Performed by: STUDENT IN AN ORGANIZED HEALTH CARE EDUCATION/TRAINING PROGRAM

## 2021-12-01 RX ORDER — RISPERIDONE 1 MG/1
1 TABLET, FILM COATED ORAL 3 TIMES DAILY
Qty: 270 TABLET | Refills: 1 | Status: SHIPPED | OUTPATIENT
Start: 2021-12-01 | End: 2022-01-24 | Stop reason: SDUPTHER

## 2021-12-01 RX ORDER — ATOMOXETINE 80 MG/1
80 CAPSULE ORAL DAILY
Qty: 90 CAPSULE | Refills: 1 | Status: SHIPPED | OUTPATIENT
Start: 2021-12-01 | End: 2022-01-17 | Stop reason: SDUPTHER

## 2021-12-02 ENCOUNTER — TELEPHONE (OUTPATIENT)
Dept: PSYCHIATRY | Facility: CLINIC | Age: 21
End: 2021-12-02

## 2022-01-17 DIAGNOSIS — F90.2 ADHD (ATTENTION DEFICIT HYPERACTIVITY DISORDER), COMBINED TYPE: ICD-10-CM

## 2022-01-17 RX ORDER — ATOMOXETINE 80 MG/1
80 CAPSULE ORAL DAILY
Qty: 90 CAPSULE | Refills: 1 | Status: SHIPPED | OUTPATIENT
Start: 2022-01-17 | End: 2022-02-22 | Stop reason: SDUPTHER

## 2022-01-24 DIAGNOSIS — F41.9 ANXIETY: ICD-10-CM

## 2022-01-24 DIAGNOSIS — F90.2 ADHD (ATTENTION DEFICIT HYPERACTIVITY DISORDER), COMBINED TYPE: ICD-10-CM

## 2022-01-24 RX ORDER — RISPERIDONE 1 MG/1
1 TABLET, FILM COATED ORAL 3 TIMES DAILY
Qty: 270 TABLET | Refills: 1 | Status: SHIPPED | OUTPATIENT
Start: 2022-01-24 | End: 2022-02-22 | Stop reason: SDUPTHER

## 2022-02-21 DIAGNOSIS — R11.11 NON-INTRACTABLE VOMITING WITHOUT NAUSEA: ICD-10-CM

## 2022-02-21 RX ORDER — OMEPRAZOLE 20 MG/1
20 CAPSULE, DELAYED RELEASE ORAL
Qty: 30 CAPSULE | Refills: 5 | Status: SHIPPED | OUTPATIENT
Start: 2022-02-21

## 2022-02-22 DIAGNOSIS — F90.2 ADHD (ATTENTION DEFICIT HYPERACTIVITY DISORDER), COMBINED TYPE: ICD-10-CM

## 2022-02-22 DIAGNOSIS — F41.9 ANXIETY: ICD-10-CM

## 2022-02-22 RX ORDER — RISPERIDONE 1 MG/1
1 TABLET, FILM COATED ORAL 3 TIMES DAILY
Qty: 270 TABLET | Refills: 1 | Status: SHIPPED | OUTPATIENT
Start: 2022-02-22 | End: 2022-03-23 | Stop reason: SDUPTHER

## 2022-02-22 RX ORDER — ATOMOXETINE 80 MG/1
80 CAPSULE ORAL DAILY
Qty: 90 CAPSULE | Refills: 1 | Status: SHIPPED | OUTPATIENT
Start: 2022-02-22 | End: 2022-03-23 | Stop reason: SDUPTHER

## 2022-03-23 ENCOUNTER — TELEPHONE (OUTPATIENT)
Dept: PSYCHIATRY | Facility: CLINIC | Age: 22
End: 2022-03-23

## 2022-03-23 ENCOUNTER — OFFICE VISIT (OUTPATIENT)
Dept: PSYCHIATRY | Facility: CLINIC | Age: 22
End: 2022-03-23
Payer: COMMERCIAL

## 2022-03-23 DIAGNOSIS — F41.9 ANXIETY: ICD-10-CM

## 2022-03-23 DIAGNOSIS — F84.9 PERVASIVE DEVELOPMENTAL DISORDER, RESIDUAL: ICD-10-CM

## 2022-03-23 DIAGNOSIS — F90.2 ADHD (ATTENTION DEFICIT HYPERACTIVITY DISORDER), COMBINED TYPE: Primary | ICD-10-CM

## 2022-03-23 DIAGNOSIS — F84.0 AUTISM SPECTRUM DISORDER: ICD-10-CM

## 2022-03-23 PROCEDURE — 90833 PSYTX W PT W E/M 30 MIN: CPT | Performed by: STUDENT IN AN ORGANIZED HEALTH CARE EDUCATION/TRAINING PROGRAM

## 2022-03-23 PROCEDURE — 99214 OFFICE O/P EST MOD 30 MIN: CPT | Performed by: STUDENT IN AN ORGANIZED HEALTH CARE EDUCATION/TRAINING PROGRAM

## 2022-03-23 RX ORDER — RISPERIDONE 1 MG/1
1 TABLET, FILM COATED ORAL 3 TIMES DAILY
Qty: 270 TABLET | Refills: 2 | Status: SHIPPED | OUTPATIENT
Start: 2022-03-23 | End: 2022-08-05 | Stop reason: SDUPTHER

## 2022-03-23 RX ORDER — ATOMOXETINE 80 MG/1
80 CAPSULE ORAL DAILY
Qty: 90 CAPSULE | Refills: 2 | Status: SHIPPED | OUTPATIENT
Start: 2022-03-23 | End: 2022-08-05 | Stop reason: SDUPTHER

## 2022-03-23 NOTE — BH TREATMENT PLAN
TREATMENT PLAN (Medication Management Only)        [facility]    Name and Date of Birth:  Mayte An 20 y o  2000  Date of Treatment Plan: March 23, 2022  Diagnosis/Diagnoses:    1  Autism spectrum disorder    2  ADHD (attention deficit hyperactivity disorder), combined type    3  Pervasive developmental disorder, residual    4  Anxiety      Strengths/Personal Resources for Self-Care: supportive family, supportive friends, taking medications as prescribed, ability to adapt to life changes, ability to communicate needs, ability to communicate well, ability to listen, ability to reason, being resoureceful, sense of humor, special hobby/interest, willingness to work on problems  Area/Areas of need (in own words): attention and concentration problems, behavioral problems  1  Long Term Goal: maintain control of behavior  Target Date:6 months - 9/23/2022  Person/Persons responsible for completion of goal: Ya Meadows  2  Short Term Objective (s) - How will we reach this goal?:   A  Provider new recommended medication/dosage changes and/or continue medication(s): continue current medications as prescribed  B  Attend medication management appointments regularly  C  Take psychiatric medications responsibly  D  Find employment via   E  Eat out twice per week with CM  F  "Watch don't look up"  Target Date:6 months - 9/23/2022  Person/Persons Responsible for Completion of Goal: Ya Meadows  Progress Towards Goals: stable  Treatment Modality: medication management every 4 months  Review due 180 days from date of this plan: 6 months - 9/23/2022  Expected length of service: ongoing treatment  My Physician/PA/NP and I have developed this plan together and I agree to work on the goals and objectives  I understand the treatment goals that were developed for my treatment  Treatment Plan completed with assistance and input from patient and verbal consent provided  Treatment plan was not signed at time of office visit secondary to COVID-19 social distancing guidelines

## 2022-03-23 NOTE — PSYCH
MEDICATION MANAGEMENT NOTE        [facility]      Name and Date of Birth:  Elizabeth Reina 20 y o  2000 MRN: [id number]    Date of Visit: March 23, 2022    Reason for Visit: Follow-up visit for medication management     SUBJECTIVE:    Elizabeth Reina is a 20 y o  male with past psychiatric history significant for ASD, ADHD, and PDD who was personally seen and evaluated today at the [address] [place] [address] E outpatient clinic for follow-up and medication management  Piero Bro presents alongside "Taz", his [facility] , who assists with history gathering  Piero Bro is appropriate in behavior and appears at baseline  He completes full assessment without difficulty  Sedrickjuan Reid endorses compliance with psychotropic medication regimen and denies adverse medication side effects  He reports mood stability over the last 3 months without concern for depression or dysphoria  He reports 7-8 hours of sleep per evening and healthy appetite  He is going to lunch 2x per week with his CM  He is "excited" to eat [employer] today  Sedrick Reid is monitoring his chocolate intake and engaging in moderation  He was praised for improvement in his behavior  Sedrick Franklin does not endorse SI/HI  There is no true lethality concern  His concentration and focus is appropriate secondary to use of Strattera  He shares that he has been more productive lately regarding his use of the Internet  He is without crying spells or ahedonia  He does report subjective "anxiety" today and worry regarding the war in [place] and the media he has been digesting  Supportive therapy provided and Sedrickjuan Reid was encouraged to take media "breaks"  He was receptive  Sedrick Reid is without new-onset panic symptomatology  He is restless and fidgety today which is his baseline  Sedrickjuan Reid continues to work with  to seek employment  He is interested in possibly seeking employment at [employer] given upcoming warm months   During today's examination, Bobo Villalba does not exhibit objective evidence of cassie/hypomania or psychosis  Bobo Villalba is not currently irritable, grandiose, labile, or pathologically euphoric  Bobo Villalba is without perceptual disturbances, such as A/V hallucinations, paranoia, ideas of reference, or delusional beliefs  Bobo Villalba denies recent ETOH or illicit substance abuse  He would like to continue current medication regimen and follow-up in 3-4 months  Current Rating Scores:     None completed today  Review Of Systems:      Constitutional as noted in HPI   ENT negative   Cardiovascular negative   Respiratory negative   Gastrointestinal negative   Genitourinary negative   Musculoskeletal negative   Integumentary negative   Neurological negative   Endocrine negative   Other Symptoms none, all other systems are negative       Past Psychiatric History: (unchanged information from previous note copied and italicized) - Information that is bolded has been updated       Inpatient psychiatric admissions: Denies  Prior outpatient psychiatric linkage: Previously linked with Dr Reji Urrutia via [facility]  Past/current psychotherapy: Denies - Is actively involved in [facility] with assistant Ileana Mendoza who assists with life skills and counseling  History of suicidal attempts/gestures: Denies  History of violence/aggressive behaviors: No overt physical aggression but history of irritability and agitation with sister   Psychotropic medication trials: Klonopin, Ritalin (too stimulated, hyperactive), Risperdal, Strattera   Substance abuse inpatient/outpatient rehabilitation: Denies     Substance Abuse History: (unchanged information from previous note copied and italicized) - Information that is bolded has been updated       No history of ETOH, illict substance, or tobacco abuse  No past legal actions or arrests secondary to substance intoxication  The patient denies prior DWIs/DUIs   No history of outpatient/inpatient rehabilitation programs  Bryon does not exhibit objective evidence of substance withdrawal during today's examination nor does Bryon appear under the influence of any psychoactive substance           Social History: (unchanged information from previous note copied and italicized) - Information that is bolded has been updated       Developmental: Documented history of milestone/developmental delay  Bryon was born full-term via vaginal birth  No birth complications noted  Denies a history of in-utero exposure to toxins/illicit substances  There a no documented history of IEP and need for special education  Full-scale IQ is unknown at the moment  Paul Qureshi has been actively involved in treatment since the age of 10 secondary to ADHD, ASD, and PDD  Education: high school diploma/GED  Marital history: single  Living arrangement, social support: father and sister (20 years old, named Richard) - mother passed away in 2010 secondary to terminal illness (cancer)  Occupational History: Works part-time at [address] [place] [address] to see employment via [employer] this summer   Access to firearms: Father reports that there are firearms in the hope but denies that [employer] no history of arrests or violence with a deadly weapon          Traumatic History: (unchanged information from previous note copied and italicized) - Information that is bolded has been updated    Tere Cali is reported  Other Traumatic Events: Denies      Past Medical History:    Past Medical History:   Diagnosis Date    ADHD (attention deficit hyperactivity disorder)     Autism spectrum disorder     GERD (gastroesophageal reflux disease)      No past medical history pertinent negatives    Past Surgical History:   Procedure Laterality Date    NO PAST SURGERIES       No Known Allergies    Substance Abuse History:    Social History     Substance and Sexual Activity   Alcohol Use No     Social History     Substance and Sexual Activity   Drug Use Never       Social History:    Social History     Socioeconomic History    Marital status: Single     Spouse name: Not on file    Number of children: Not on file    Years of education: Not on file    Highest education level: Not on file   Occupational History    Not on file   Tobacco Use    Smoking status: Never Smoker    Smokeless tobacco: Never Used   Substance and Sexual Activity    Alcohol use: No    Drug use: Never    Sexual activity: Not on file   Other Topics Concern    Not on file   Social History Narrative    Not on file     Social Determinants of Health     Financial Resource Strain: Not on file   Food Insecurity: Not on file   Transportation Needs: Not on file   Physical Activity: Not on file   Stress: Not on file   Social Connections: Not on file   Intimate Partner Violence: Not on file   Housing Stability: Not on file       Family Psychiatric History:     Family History   Problem Relation Age of Onset    Lung cancer Mother     Diabetes Father     Alcohol abuse Father         in sustained remission    ADD / ADHD Sister     Autism Sister     Cancer Paternal Grandmother     No Known Problems Brother     No Known Problems Sister     No Known Problems Sister     No Known Problems Sister        History Review: The following portions of the patient's history were reviewed and updated as appropriate: allergies, current medications, past family history, past medical history, past social history, past surgical history and problem list          OBJECTIVE:     Vital signs in last 24 hours: There were no vitals filed for this visit      Mental Status Evaluation:    Appearance age appropriate, casually dressed, dressed appropriately, looks stated age   Behavior pleasant, cooperative, restless and fidgety   Speech normal rate, normal volume, normal pitch   Mood euthymic   Affect normal range and intensity, appropriate   Thought Processes organized, logical, goal directed   Associations intact associations   Thought Content no overt delusions   Perceptual Disturbances: no auditory hallucinations, no visual hallucinations   Abnormal Thoughts  Risk Potential Suicidal ideation - None at present  Homicidal ideation - None at present  Potential for aggression - No   Orientation oriented to person, place, time/date and situation   Memory recent and remote memory grossly intact   Consciousness alert and awake   Attention Span Concentration Span attention span and concentration appear shorter than expected for age   Intellect appears to be below average intelligence   Insight fair   Judgement fair   Muscle Strength and  Gait normal gait and normal balance   Motor activity no abnormal movements   Language no difficulty naming common objects   Fund of Knowledge adequate knowledge of current events   Pain none   Pain Scale 0       Laboratory Results: I have personally reviewed all pertinent laboratory/tests results    Recent Labs (last 6 months):   Appointment on 11/19/2021   Component Date Value    Cholesterol 11/19/2021 123     Triglycerides 11/19/2021 62     HDL, Direct 11/19/2021 43     LDL Calculated 11/19/2021 68     Non-HDL-Chol (CHOL-HDL) 11/19/2021 80     Sodium 11/19/2021 139     Potassium 11/19/2021 4 1     Chloride 11/19/2021 106     CO2 11/19/2021 29     ANION GAP 11/19/2021 4     BUN 11/19/2021 19     Creatinine 11/19/2021 0 93     Glucose, Fasting 11/19/2021 94     Calcium 11/19/2021 9 1     AST 11/19/2021 27     ALT 11/19/2021 40     Alkaline Phosphatase 11/19/2021 77     Total Protein 11/19/2021 7 8     Albumin 11/19/2021 4 3     Total Bilirubin 11/19/2021 0 78     eGFR 11/19/2021 116     Hemoglobin A1C 11/19/2021 5 3     EAG 11/19/2021 105        Suicide/Homicide Risk Assessment:    The following interventions are recommended: no intervention changes needed      Lethality Statement:    Based on today's assessment and clinical criteria, Robyn Nolen contracts for safety and is not an imminent risk of harm to self or others  Outpatient level of care is deemed appropriate at this current time  Lilly Cordon understands that if they can no longer contract for safety, they need to call the office or report to their nearest Emergency Room for immediate evaluation  Assessment/Plan:     Finesse Ang a 20 y  o  male with past psychiatric history significant for ASD, ADHD, and PDD who was personally seen and evaluated today at the [address] [place] [address] outpatient clinic for follow-up and medication management  Bryon has been involved in psychiatric/neurologic treatment since the age of 10 and has been seeing Dr Aldona Lundborg at [facility] for the last 15 years  Lilly Cordon has been trialed on numerous psychotropic medications, including Risperdal, Straterra, Klonopin, and Ritalin  He endorses ongoing compliance with current medication regimen that consists of Risperdal 1mg AM, 2mg PM and Straterra 80mg Daily  Father reports that Lilly Cordon was excessively hyper, impulsive, and dysregulated during periods of psychostimulant use in the past, so these agents should be avoided  Lilly Cordon is currently in the process of being linked with Neurology as well secondary to PDD and ASD  Lilly Cordon has a history of IEP/504 plans, ritualistic behaviors, and is often rigid and inflexible in his cognitive struggle  Father reports a pervasive difficulty with social interaction and emotional reciprocity  iLlly Cordon is currently involved in numerous community-based resources to assist with life skills and to foster greater autonomy and independence  He meets with Mabel Sibley from [facility] and Kevin Carmichael () frequently and finds pleasure in this  Father denies recent behavioral outbursts or physical aggression  Lilly Cordon' sister, who also has ASD, appears to be a major trigger for Lilly Cordon   However, father denies physical altercations  Bryon denies historical neurovegetative symptomatology suggestive of major depressive disorder or dysthymia  Bryon vehemently denies any acute or chronic history suggestive of an underlying affective (bipolar) organization  Bryon denies historical symptomatology suggestive of an underlying psychotic process  Bryon denies historical symptomatology suggestive of PTSD, OCD, or disordered eating       Today's Plan:    Psychopharmacologically, Robin Michel reports mood stability and behavioral control with Straterra and Risperdal  He is without lethality concern  As such, no intervention is warranted at this juncture         DSM-V Diagnoses:      1  ) Autism Spectrum Disorder  2 ) ADHD  3 ) PDD by history         Treatment Recommendations/Precautions:        1  ) Autism Spectrum Disorder  - Continue Risperdal 1mg AM, 2mg PM secondary to agitation and irritability associated with ASD  - Continue engagement in weekly socialization with Taz via [employer] - learning life skills, etc    - Psychoeducation provided regarding the importance of exercise and healthy dietary choices and their impact on mood, energy, and motivation  - Counseled to avoid ETOH, illict substances, and nicotine secondary to the detrimental effects of these substances on mental and physical health  - Encouraged to engage in non-verbal forms of therapy such as art therapy, music therapy, and mindfulness     2 ) ADHD  - Continue Straterra 80mg Daily              - Hx of paradoxical excitation with psychostimulant use (Ritalin) - avoid         3 ) PDD by history   - Continue Risperdal 1mg AM, 2mg PM secondary to agitation and irritability associated with ASD  - Continue engagement in weekly socialization with Taz via [employer] - learning life skills, etc    - Psychoeducation provided regarding the importance of exercise and healthy dietary choices and their impact on mood, energy, and motivation  - Counseled to avoid ETOH, illict substances, and nicotine secondary to the detrimental effects of these substances on mental and physical health  - Encouraged to engage in non-verbal forms of therapy such as art therapy, music therapy, and mindfulness     Medication management every 3 months  Aware of need to follow up with family physician for medical issues  Aware of 24 hour and weekend coverage for urgent situations accessed by calling [facility] main practice number    Medications Risks/Benefits      Risks, Benefits And Possible Side Effects Of Medications:    Risks, benefits, and possible side effects of medications explained to Paul Qureshi including risk of parkinsonian symptoms, Tardive Dyskinesia and metabolic syndrome related to treatment with antipsychotic medications  He verbalizes understanding and agreement for treatment  Controlled Medication Discussion:     No recent records found for controlled prescriptions according to [place] Prescription Drug Monitoring Program    Psychotherapy Provided:     Individual psychotherapy provided: Yes  Counseling was provided during the session today for 16 minutes  Medications, treatment progress and treatment plan reviewed with Paul Qureshi  Medication education provided to Paul Qureshi  Recent stressors discussed with Paul Qureshi including family issues, job loss, social difficulties and everyday stressors  Coping strategies reviewed with Paul Qureshi  Educated on importance of medication and treatment compliance  Importance of follow up with family physician for medical issues reviewed with Paul Qureshi  Supportive therapy provided  Treatment Plan:    Completed and signed during the session: Yes - Treatment Plan done but not signed at time of office visit due to:  Plan reviewed in person and verbal consent given due to Yoko social alma rosa    Note Share Disclaimer:      This note was not shared with the patient due to reasonable likelihood of causing patient harm    Charis Reyes MD 03/23/22

## 2022-04-04 ENCOUNTER — TELEPHONE (OUTPATIENT)
Dept: PSYCHIATRY | Facility: CLINIC | Age: 22
End: 2022-04-04

## 2022-04-04 NOTE — TELEPHONE ENCOUNTER
Madeline Allred called and LM on nursing line  He stated his son is on Risperidone and he contacted [facility] and they stated it needs a PA and would fax to our office  He was to make our office aware  Received fax from [facility] for PA of Aripiprazole    Placed in binder for completion

## 2022-04-04 NOTE — TELEPHONE ENCOUNTER
[facility] clinical Review called and LM on nursing line  They will need more information for Risperidone PA 1 mg       [facility] # [id number]    [phone]

## 2022-04-05 NOTE — TELEPHONE ENCOUNTER
Completed PA form additional information from [employer] for Risperidone 1 mg   Faxed completed form and office notes dated 3/23/22 to [employer] @ [phone]

## 2022-04-05 NOTE — TELEPHONE ENCOUNTER
Spoke to Ashley Whitelaw, father, notifying him that we submitted the PA request for the Risperidone 1 mg tab to his insurance for Nancy Camargo  Ashley Pal is familiar with the PA process  Will call him back when a decision is reached  For your review

## 2022-04-06 NOTE — TELEPHONE ENCOUNTER
Received a fax from [facility] approving the risperidone 1 mg tab (TID) from 4/5/22-12/31/22  Spoke to Patricia, pharmacist at the [facility] to notify her of the Risperidone 1 mg tab (TID) PA approval and Patricia states that there is a paid claim  Spoke to Rob stanton, father, notifying him that the Risperidone 1 mg tab (TID) was approved and is available at the pharmacy  For your review  Will scan to [employer]

## 2022-04-29 ENCOUNTER — TELEPHONE (OUTPATIENT)
Dept: PSYCHIATRY | Facility: CLINIC | Age: 22
End: 2022-04-29

## 2022-04-29 NOTE — TELEPHONE ENCOUNTER
Pt father called with the sons there as well with permission to cx appt on 6/29/22 with dr Magaly Lora pt will be on vacation  Follow-up scheduled for 8/5/22 at 12  Thank you

## 2022-05-18 NOTE — ANESTHESIA POSTPROCEDURE EVALUATION
Post-Op Assessment Note    CV Status:  Stable  Pain Score: 0    Pain management: adequate     Mental Status:  Alert and awake   Hydration Status:  Euvolemic   PONV Controlled:  Controlled   Airway Patency:  Patent      Post Op Vitals Reviewed: Yes      Staff: Anesthesiologist, CRNA         No complications documented      /60 (06/10/21 0852)    Temp 98 °F (36 7 °C) (06/10/21 0852)    Pulse 81 (06/10/21 0852)   Resp 18 (06/10/21 0852)    SpO2 94 % (06/10/21 0852) Isotretinoin Counseling: Patient should get monthly blood tests, not donate blood, not drive at night if vision affected, not share medication, and not undergo elective surgery for 6 months after tx completed. Side effects reviewed, pt to contact office should one occur.

## 2022-08-05 ENCOUNTER — OFFICE VISIT (OUTPATIENT)
Dept: PSYCHIATRY | Facility: CLINIC | Age: 22
End: 2022-08-05
Payer: COMMERCIAL

## 2022-08-05 DIAGNOSIS — F41.9 ANXIETY: ICD-10-CM

## 2022-08-05 DIAGNOSIS — F90.2 ADHD (ATTENTION DEFICIT HYPERACTIVITY DISORDER), COMBINED TYPE: ICD-10-CM

## 2022-08-05 DIAGNOSIS — F84.0 AUTISM SPECTRUM DISORDER: Primary | ICD-10-CM

## 2022-08-05 DIAGNOSIS — F84.9 PERVASIVE DEVELOPMENTAL DISORDER, RESIDUAL: ICD-10-CM

## 2022-08-05 PROCEDURE — 90833 PSYTX W PT W E/M 30 MIN: CPT | Performed by: STUDENT IN AN ORGANIZED HEALTH CARE EDUCATION/TRAINING PROGRAM

## 2022-08-05 PROCEDURE — 99214 OFFICE O/P EST MOD 30 MIN: CPT | Performed by: STUDENT IN AN ORGANIZED HEALTH CARE EDUCATION/TRAINING PROGRAM

## 2022-08-05 RX ORDER — ATOMOXETINE 80 MG/1
80 CAPSULE ORAL DAILY
Qty: 90 CAPSULE | Refills: 5 | Status: SHIPPED | OUTPATIENT
Start: 2022-08-05

## 2022-08-05 RX ORDER — RISPERIDONE 1 MG/1
1 TABLET, FILM COATED ORAL 3 TIMES DAILY
Qty: 270 TABLET | Refills: 5 | Status: SHIPPED | OUTPATIENT
Start: 2022-08-05 | End: 2022-09-04

## 2022-08-05 NOTE — PSYCH
MEDICATION MANAGEMENT NOTE        [facility]      Name and Date of Birth:  Tricia English 20 y o  2000 MRN: [id number]    Date of Visit: August 5, 2022    Reason for Visit: Follow-up visit for medication management     SUBJECTIVE:    Tricia English is a 20 y o  male with past psychiatric history significant for ASD, ADHD, and PDD who was personally seen and evaluated today at the [address] [place] [address] E outpatient clinic for follow-up and medication management  Jaime Caliluci presents alongside his father who assists with history gathering  Ligia Hendrix is pleasant and cooperative  He is notably more talkative and engaging today  He completes assessment without difficulty  Jaime Shah endorses compliance with psychotropic medication regimen consisting of Risperdal and Strattera  He denies adverse medication side effects  Ligia Hendrix and father speak at length today regarding Asif's recent behaviors and ways he becomes "hyperfixated" on political shows and blogs  Ligia Hendrix reports stress related to this  I discussed taking a social/medica holiday and ways this could lessen his distress  He was receptive  Ligiaaury Mcintyreannette admitted that he wants "everyone to think his way"  We discussed rigidity and the importance of flexibility  I shared with Ligia Hendrix that he "must look out more than one window to know where you stand"  I applauded Ligia Hendrix for his willingness to want to expand his world view and read, but the information he consumes must be appropriate  He was encouraged to consider more books (ie avVenta, etc ) than Alhaji Naranjo  I also spoke at length with Ligia Hendrix and his father regarding the potential benefits of a gratitude/positive journal  He was encouraged to write down one positive moment each day and to review this weekly then monthly  He will bring his journal next session  Ligia Hendrix voices current frustration and worry regarding lack of employment   They are engaged with [employer] ([phone]) via [address] but find it challenging for them to move the process forward  I will attempt to speak with her directly  Finding gainful employment will assist with skill-building, independence, and provide structure/routine  Acutely, Cesar Bolaños denies neurovegetative symptomatology  His sleep and appetite are appropriate  He does not endorse SI/HI  He is eager to bake cookies with his sister today  He does not experience daily crying spells or anhedonia  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  Cesar Bolaños reports appropriate concentration/focus with use of Strattera  His mood and behavior have been well managed with Garnetta Alexsander is without panic attacks  He is mildly on-edge today, mostly regarding political affairs  During today's examination, Shannon Oseguera does not exhibit objective evidence of cassie/hypomania or psychosis  Shannon Oseguera is not currently irritable, grandiose, hypertalkative, or pathologically euphoric  Shannon Oseguera is without perceptual disturbances, such as A/V hallucinations, paranoia, ideas of reference, or delusional beliefs  Shannon Oseguera denies recent ETOH or illicit substance abuse  He offers no further concerns  Current Rating Scores:     None completed today      Review Of Systems:      Constitutional as noted in HPI   ENT negative   Cardiovascular negative   Respiratory negative   Gastrointestinal negative   Genitourinary negative   Musculoskeletal negative   Integumentary negative   Neurological negative   Endocrine negative   Other Symptoms none, all other systems are negative       Past Psychiatric History: (unchanged information from previous note copied and italicized) - Information that is bolded has been updated       Inpatient psychiatric admissions: Denies  Prior outpatient psychiatric linkage: Previously linked with Dr Obed Pastor via [facility]  Past/current psychotherapy: Denies - Is actively involved in [facility] with assistant Tova Daily who assists with life skills and counseling  History of suicidal attempts/gestures: Denies  History of violence/aggressive behaviors: No overt physical aggression but history of irritability and agitation with sister   Psychotropic medication trials: Klonopin, Ritalin (too stimulated, hyperactive), Risperdal, Strattera   Substance abuse inpatient/outpatient rehabilitation: Denies     Substance Abuse History: (unchanged information from previous note copied and italicized) - Information that is bolded has been updated       No history of ETOH, illict substance, or tobacco abuse  No past legal actions or arrests secondary to substance intoxication  The patient denies prior DWIs/DUIs  No history of outpatient/inpatient rehabilitation programs  Bryon does not exhibit objective evidence of substance withdrawal during today's examination nor does Bryon appear under the influence of any psychoactive substance           Social History: (unchanged information from previous note copied and italicized) - Information that is bolded has been updated       Developmental: Documented history of milestone/developmental delay  Bryon was born full-term via vaginal birth  No birth complications noted  Denies a history of in-utero exposure to toxins/illicit substances  There a no documented history of IEP and need for special education  Full-scale IQ is unknown at the moment  Aleksandra Alexandro has been actively involved in treatment since the age of 10 secondary to ADHD, ASD, and PDD    Education: high school diploma/GED  Marital history: single  Living arrangement, social support: father and sister (20 years old, named Richard) - mother passed away in 2010 secondary to terminal illness (cancer)  Occupational History: No longer working part-time at [employer] - seeking new employment via [employer] to firearms: Father reports that there are firearms in the hope but denies that [employer] no history of arrests or violence with a deadly weapon          Traumatic History: (unchanged information from previous note copied and italicized) - Information that is bolded has been updated    Karen Turner is reported  Other Traumatic Events: Denies        Past Medical History:    Past Medical History:   Diagnosis Date    ADHD (attention deficit hyperactivity disorder)     Autism spectrum disorder     GERD (gastroesophageal reflux disease)         Past Surgical History:   Procedure Laterality Date    NO PAST SURGERIES       No Known Allergies    Substance Abuse History:    Social History     Substance and Sexual Activity   Alcohol Use No     Social History     Substance and Sexual Activity   Drug Use Never       Social History:    Social History     Socioeconomic History    Marital status: Single     Spouse name: Not on file    Number of children: Not on file    Years of education: Not on file    Highest education level: Not on file   Occupational History    Not on file   Tobacco Use    Smoking status: Never Smoker    Smokeless tobacco: Never Used   Substance and Sexual Activity    Alcohol use: No    Drug use: Never    Sexual activity: Not on file   Other Topics Concern    Not on file   Social History Narrative    Not on file     Social Determinants of Health     Financial Resource Strain: Not on file   Food Insecurity: Not on file   Transportation Needs: Not on file   Physical Activity: Not on file   Stress: Not on file   Social Connections: Not on file   Intimate Partner Violence: Not on file   Housing Stability: Not on file       Family Psychiatric History:     Family History   Problem Relation Age of Onset    Lung cancer Mother     Diabetes Father     Alcohol abuse Father         in sustained remission    ADD / ADHD Sister     Autism Sister     Cancer Paternal Grandmother     No Known Problems Brother     No Known Problems Sister     No Known Problems Sister     No Known Problems Sister        History Review:  The following portions of the patient's history were reviewed and updated as appropriate: allergies, current medications, past family history, past medical history, past social history, past surgical history and problem list          OBJECTIVE:     Vital signs in last 24 hours: There were no vitals filed for this visit  Mental Status Evaluation:    Appearance age appropriate, casually dressed, dressed appropriately, looks stated age, bearded   Behavior pleasant, cooperative, fair eye contact, restless and fidgety   Speech normal rate, normal volume, normal pitch   Mood "OK"   Affect constricted   Thought Processes organized, logical, goal directed   Associations intact associations   Thought Content no overt delusions   Perceptual Disturbances: no auditory hallucinations, no visual hallucinations   Abnormal Thoughts  Risk Potential Suicidal ideation - None  Homicidal ideation - None at present  Potential for aggression - No   Orientation oriented to person, place, time/date and situation   Memory recent and remote memory grossly intact   Consciousness alert and awake   Attention Span Concentration Span attention span and concentration are age appropriate   Intellect appears to be of average intelligence   Insight fair   Judgement intact   Muscle Strength and  Gait normal gait and normal balance   Motor activity no abnormal movements   Language no difficulty naming common objects   Fund of Knowledge adequate knowledge of current events   Pain none   Pain Scale 0       Laboratory Results: I have personally reviewed all pertinent laboratory/tests results    Recent Labs (last 6 months):   No visits with results within 6 Month(s) from this visit     Latest known visit with results is:   Appointment on 11/19/2021   Component Date Value    Cholesterol 11/19/2021 123     Triglycerides 11/19/2021 62     HDL, Direct 11/19/2021 43     LDL Calculated 11/19/2021 68     Non-HDL-Chol (CHOL-HDL) 11/19/2021 80     Sodium 11/19/2021 139     Potassium 11/19/2021 4 1     Chloride 11/19/2021 106     CO2 11/19/2021 29     ANION GAP 11/19/2021 4     BUN 11/19/2021 19     Creatinine 11/19/2021 0 93     Glucose, Fasting 11/19/2021 94     Calcium 11/19/2021 9 1     AST 11/19/2021 27     ALT 11/19/2021 40     Alkaline Phosphatase 11/19/2021 77     Total Protein 11/19/2021 7 8     Albumin 11/19/2021 4 3     Total Bilirubin 11/19/2021 0 78     eGFR 11/19/2021 116     Hemoglobin A1C 11/19/2021 5 3     EAG 11/19/2021 105        Suicide/Homicide Risk Assessment:    The following interventions are recommended: no intervention changes needed      Lethality Statement:    Based on today's assessment and clinical criteria, [facility] contracts for safety and is not an imminent risk of harm to self or others  Outpatient level of care is deemed appropriate at this current time  Jose Mata understands that if they can no longer contract for safety, they need to call the office or report to their nearest Emergency Room for immediate evaluation  Assessment/Plan:     Mode Vicente a 20 y  o  male with past psychiatric history significant for ASD, ADHD, and PDD who was personally seen and evaluated today at the [address] [place] [address] E outpatient clinic for follow-up and medication management  Bryon has been involved in psychiatric/neurologic treatment since the age of 10 and has been seeing Dr Raymon Carranza at [facility] for the last 15 years  Jose Mata has been trialed on numerous psychotropic medications, including Risperdal, Straterra, Klonopin, and Ritalin  He endorses ongoing compliance with current medication regimen that consists of Risperdal 1mg AM, 2mg PM and Straterra 80mg Daily  Father reports that Jose Mata was excessively hyper, impulsive, and dysregulated during periods of psychostimulant use in the past, so these agents should be avoided   Jose Mata is currently in the process of being linked with Neurology as well secondary to PDD and ASD  Paul Qureshi has a history of IEP/504 plans, ritualistic behaviors, and is often rigid and inflexible in his cognitive struggle  Father reports a pervasive difficulty with social interaction and emotional reciprocity  Paul Qureshi is currently involved in numerous community-based resources to assist with life skills and to foster greater autonomy and independence  He meets with Cheryl Blevins from [employer] [employer] () frequently and finds pleasure in this  Father denies recent behavioral outbursts or physical aggression  Paul Qureshi' sister, who also has ASD, appears to be a major trigger for Paul Qureshi  However, father denies physical altercations  Bryon denies historical neurovegetative symptomatology suggestive of major depressive disorder or dysthymia  Bryon vehemently denies any acute or chronic history suggestive of an underlying affective (bipolar) organization  Bryon denies historical symptomatology suggestive of an underlying psychotic process  Bryon denies historical symptomatology suggestive of PTSD, OCD, or disordered eating       Today's Plan:     Psychopharmacologically, Bishop Eugene endorses appropriate use, benefit and tolerability associated with Risperdal and Strattera  He and his father deny need for medication changes  We did speak at length regarding behavioral interventions and ways to cognitive re-frame his thinking  He was receptive         DSM-V Diagnoses:      1  ) Autism Spectrum Disorder  2 ) ADHD  3 ) PDD by history         Treatment Recommendations/Precautions:        1  ) Autism Spectrum Disorder  - Continue Risperdal 1mg AM, 2mg PM secondary to agitation and irritability associated with ASD  - Continue engagement in weekly socialization with Taz via [facility] - learning life skills, etc    - Psychoeducation provided regarding the importance of exercise and healthy dietary choices and their impact on mood, energy, and motivation  - Counseled to avoid ETOH, illict substances, and nicotine secondary to the detrimental effects of these substances on mental and physical health  - Encouraged to engage in non-verbal forms of therapy such as art therapy, music therapy, and mindfulness     2 ) ADHD  - Continue Straterra 80mg Daily              - Hx of paradoxical excitation with psychostimulant use (Ritalin) - avoid         3 ) PDD by history   - Continue Risperdal 1mg AM, 2mg PM secondary to agitation and irritability associated with ASD  - Continue engagement in weekly socialization with Taz via [facility] - learning life skills, etc    - Psychoeducation provided regarding the importance of exercise and healthy dietary choices and their impact on mood, energy, and motivation  - Counseled to avoid ETOH, illict substances, and nicotine secondary to the detrimental effects of these substances on mental and physical health  - Encouraged to engage in non-verbal forms of therapy such as art therapy, music therapy, and mindfulness        Medication management every 3 months  Aware of need to follow up with family physician for medical issues  Aware of 24 hour and weekend coverage for urgent situations accessed by calling [facility] main practice number  Follow up with own Intensive     Medications Risks/Benefits      Risks, Benefits And Possible Side Effects Of Medications:    Risks, benefits, and possible side effects of medications explained to Lilly Cordon including risk of parkinsonian symptoms, Tardive Dyskinesia and metabolic syndrome related to treatment with antipsychotic medications  He verbalizes understanding and agreement for treatment  Controlled Medication Discussion:     No recent records found for controlled prescriptions according to [place] Prescription Drug Monitoring Program    Psychotherapy Provided:     Individual psychotherapy provided: Yes  Counseling was provided during the session today for 16 minutes    Medications, treatment progress and treatment plan reviewed with Heidy Bass  Medication education provided to Heidy Bass  Recent stressors discussed with Heidy Bass including family conflict, family issues, job loss, social difficulties, everyday stressors and occasional anxiety  Coping strategies including baking, compliance with medications, getting into a good routine, increasing social interaction, keeping busy at home, maintain healthy diet, maintain heathy sleeping hygiene, maintain positive attitude, stress reduction and spending time with family reviewed with Heidy Bass  Educated on importance of medication and treatment compliance  Importance of follow up with family physician for medical issues reviewed with Heidy Bass  Supportive therapy provided  Cognitive therapy was utilized during the session  Treatment Plan:    Completed and signed during the session: Yes - Treatment Plan done but not signed at time of office visit due to:  Plan reviewed in person and verbal consent given due to Yoko social alma rosa    Note Share Disclaimer:      This note was not shared with the patient due to reasonable likelihood of causing patient harm      Magaly Chen MD  Board Certified Diplomate of the [address] [place] [address] Rd , [address] of Psychiatry and Neurology  08/05/22

## 2022-08-05 NOTE — BH TREATMENT PLAN
TREATMENT PLAN (Medication Management Only)        [facility]    Name and Date of Birth:  Daniel Marroquin 20 y o  2000  Date of Treatment Plan: August 5, 2022  Diagnosis/Diagnoses:    1  Autism spectrum disorder    2  ADHD (attention deficit hyperactivity disorder), combined type    3  Pervasive developmental disorder, residual    4  Anxiety      Strengths/Personal Resources for Self-Care: supportive family, taking medications as prescribed, ability to communicate needs, ability to communicate well, good understanding of illness, motivation for treatment, ability to negotiate basic needs, being resoureceful  Area/Areas of need (in own words): attention and concentration problems, ADHD symptoms, behavioral problems  1  Long Term Goal: maintain control of ADHD symptoms  Target Date:6 months - 2/5/2023  Person/Persons responsible for completion of goal: Dylon Luis  2  Short Term Objective (s) - How will we reach this goal?:   A  Provider new recommended medication/dosage changes and/or continue medication(s): continue current medications as prescribed  B  Attend medication management appointments regularly  C  Take psychiatric medications responsibly  D  Read more books, read less blogs  E  Bake cookies with my sister  F  Find a job  Target Date:6 months - 2/5/2023  Person/Persons Responsible for Completion of Goal: Dylon Luis  Progress Towards Goals: stable, continuing treatment  Treatment Modality: medication management every 4 months  Review due 180 days from date of this plan: 6 months - 2/5/2023  Expected length of service: ongoing treatment  My Physician/PA/NP and I have developed this plan together and I agree to work on the goals and objectives  I understand the treatment goals that were developed for my treatment  Treatment Plan completed with assistance and input from patient and verbal consent provided   Treatment plan was not signed at time of office visit secondary to COVID-19 social distancing guidelines

## 2022-08-24 DIAGNOSIS — R11.11 NON-INTRACTABLE VOMITING WITHOUT NAUSEA: ICD-10-CM

## 2022-08-25 RX ORDER — OMEPRAZOLE 20 MG/1
20 CAPSULE, DELAYED RELEASE ORAL
Qty: 30 CAPSULE | Refills: 5 | Status: SHIPPED | OUTPATIENT
Start: 2022-08-25

## 2022-10-27 ENCOUNTER — TELEPHONE (OUTPATIENT)
Dept: PSYCHIATRY | Facility: CLINIC | Age: 22
End: 2022-10-27

## 2022-10-27 NOTE — TELEPHONE ENCOUNTER
Medical record request was received from [employer] for time frame of 1/1/2020 to 10/20/2022  Will be placed in Dr Beba Bryan by the end of the day

## 2022-11-07 ENCOUNTER — OFFICE VISIT (OUTPATIENT)
Dept: PSYCHIATRY | Facility: CLINIC | Age: 22
End: 2022-11-07

## 2022-11-07 DIAGNOSIS — F84.0 AUTISM SPECTRUM DISORDER: Primary | ICD-10-CM

## 2022-11-07 DIAGNOSIS — F84.9 PERVASIVE DEVELOPMENTAL DISORDER, RESIDUAL: ICD-10-CM

## 2022-11-07 DIAGNOSIS — F90.2 ADHD (ATTENTION DEFICIT HYPERACTIVITY DISORDER), COMBINED TYPE: ICD-10-CM

## 2022-11-07 DIAGNOSIS — F41.9 ANXIETY: ICD-10-CM

## 2022-11-07 RX ORDER — RISPERIDONE 1 MG/1
1 TABLET, FILM COATED ORAL 3 TIMES DAILY
Qty: 270 TABLET | Refills: 5 | Status: SHIPPED | OUTPATIENT
Start: 2022-11-07 | End: 2022-12-07

## 2022-11-07 RX ORDER — ATOMOXETINE 80 MG/1
80 CAPSULE ORAL DAILY
Qty: 90 CAPSULE | Refills: 5 | Status: SHIPPED | OUTPATIENT
Start: 2022-11-07

## 2022-11-07 NOTE — PSYCH
MEDICATION MANAGEMENT NOTE        [facility]      Name and Date of Birth:  Lamonte Barclay 20 y o  2000 MRN: [id number]    Date of Visit: November 7, 2022    Reason for Visit: Follow-up visit for medication management       SUBJECTIVE:    Lamonte Barclay is a 20 y o  male with past psychiatric history significant for ASD, ADHD, and PDD who was personally seen and evaluated today at the [address] [place] [address] E outpatient clinic for follow-up and medication management  Ishmael Redding presents alongside his father who assist with history gathering  Sadiq Medina is mostly pleasant and cooperative  Family and Sadiq Medina complete assessment without difficulty  Ishmael Redding endorses compliance with psychotropic medication regimen consisting of Risperdal and Atomoxetine  He denies adverse medication side effects  Asif's father shares that they have been in contact with Rahat Cobos from [facility] and the process of having Sadiq Medina return to school and/or obtain employment is moving forward  They hope by 1/1/23 they'll have a solidified plan  Sadiq Medina remains fixated on the current political climate  However, he and dad are pleased to share that he is attempting to distance himself from these radical ads  He is watching more educational shows ("[employer]) and is benefiting from this  He continues to engage with Jose Maria, his ICM, 2x per week  We discussed other ways to be active and less isolative, as dad voiced frustration that Sadiq Medina has been "staying in the basement more often"  He was encouraged to spend other 2 days per week outside  Dad states that he plans to purchase a bike for Sadiq Medina  Acutely, Sadiq Medina reports adequate sleep  He does experience episodic times when he awakes at [facility]  He denies this to be problematic  Sadiq Medina reports adequate diet  His without SI/HI  His energy and motivation are fair  Ishmael Redding reports appropriate concentration/focus  He is without overwhelming anxiety or panic symptoms   Brigida states that Summer Leal has been increasingly more "oppositional" and antagonistic with himself (dad) and Asif's sister  Joint problem solving utilized  Summer Leal voices frustration regarding his sister's devaluing nature and supportive therapy provided  Summer Leal has been mildly more behaviorally dysregulated but dad denies need for medication changes  Summer Leal has been completing daily gratitude journal discussed at last visit  Unfortunately, he forgot to bring it in today  He states that his dad is at fault for this and extensive psychoeducation was provided regarding ways to remind himself next visit and the need to assume responsibility and accountantability for his own actions  During today's examination, Andrew Hughes does not exhibit objective evidence of cassie/hypomania or psychosis  Andrew Hughes denies recent ETOH or illicit substance abuse  Brigida and Summer Leal offer no further concerns  Current Rating Scores:     None completed today      Review Of Systems:      Constitutional as noted in HPI   ENT negative   Cardiovascular negative   Respiratory negative   Gastrointestinal negative   Genitourinary negative   Musculoskeletal negative   Integumentary negative   Neurological negative   Endocrine negative   Other Symptoms none, all other systems are negative       Past Psychiatric History: (unchanged information from previous note copied and italicized) - Information that is bolded has been updated       Inpatient psychiatric admissions: Denies  Prior outpatient psychiatric linkage: Previously linked with Dr Graeme Haines via [facility]  Past/current psychotherapy: Denies - Is actively involved in [facility] with assistant Bridget Velazco who assists with life skills and counseling  History of suicidal attempts/gestures: Denies  History of violence/aggressive behaviors: No overt physical aggression but history of irritability and agitation with sister   Psychotropic medication trials: Klonopin, Ritalin (too stimulated, hyperactive), Risperdal, Strattera   Substance abuse inpatient/outpatient rehabilitation: Denies     Substance Abuse History: (unchanged information from previous note copied and italicized) - Information that is bolded has been updated       No history of ETOH, illict substance, or tobacco abuse  No past legal actions or arrests secondary to substance intoxication  The patient denies prior DWIs/DUIs  No history of outpatient/inpatient rehabilitation programs  Bryon does not exhibit objective evidence of substance withdrawal during today's examination nor does Bryon appear under the influence of any psychoactive substance           Social History: (unchanged information from previous note copied and italicized) - Information that is bolded has been updated       Developmental: Documented history of milestone/developmental delay  Bryon was born full-term via vaginal birth  No birth complications noted  Denies a history of in-utero exposure to toxins/illicit substances  There a no documented history of IEP and need for special education  Full-scale IQ is unknown at the moment  Chavez Benson has been actively involved in treatment since the age of 10 secondary to ADHD, ASD, and PDD    Education: high school diploma/GED  Marital history: single  Living arrangement, social support: father and sister (20 years old, named Richard) - mother passed away in 2010 secondary to terminal illness (cancer)  Occupational History: No longer working part-time at [employer] - seeking new employment via [employer] to firearms: Father reports that there are firearms in the hope but denies that [employer] no history of arrests or violence with a deadly weapon          Traumatic History: (unchanged information from previous note copied and italicized) - Information that is bolded has been updated       Abuse:none is reported  Other Traumatic Events: Denies        Past Medical History:    Past Medical History:   Diagnosis Date   • ADHD (attention deficit hyperactivity disorder)    • Autism spectrum disorder    • GERD (gastroesophageal reflux disease)         Past Surgical History:   Procedure Laterality Date   • NO PAST SURGERIES       No Known Allergies    Substance Abuse History:    Social History     Substance and Sexual Activity   Alcohol Use No     Social History     Substance and Sexual Activity   Drug Use Never       Social History:    Social History     Socioeconomic History   • Marital status: Single     Spouse name: Not on file   • Number of children: Not on file   • Years of education: Not on file   • Highest education level: Not on file   Occupational History   • Not on file   Tobacco Use   • Smoking status: Never Smoker   • Smokeless tobacco: Never Used   Substance and Sexual Activity   • Alcohol use: No   • Drug use: Never   • Sexual activity: Not on file   Other Topics Concern   • Not on file   Social History Narrative   • Not on file     Social Determinants of Health     Financial Resource Strain: Low Risk    • Difficulty of Paying Living Expenses: Not very hard   Food Insecurity: Not on file   Transportation Needs: No Transportation Needs   • Lack of Transportation (Medical): No   • Lack of Transportation (Non-Medical): No   Physical Activity: Not on file   Stress: Not on file   Social Connections: Not on file   Intimate Partner Violence: Not on file   Housing Stability: Not on file       Family Psychiatric History:     Family History   Problem Relation Age of Onset   • Lung cancer Mother    • Diabetes Father    • Alcohol abuse Father         in sustained remission   • ADD / ADHD Sister    • Autism Sister    • Cancer Paternal Grandmother    • No Known Problems Brother    • No Known Problems Sister    • No Known Problems Sister    • No Known Problems Sister        History Review:  The following portions of the patient's history were reviewed and updated as appropriate: allergies, current medications, past family history, past medical history, past social history, past surgical history and problem list          OBJECTIVE:     Vital signs in last 24 hours: There were no vitals filed for this visit  Mental Status Evaluation:    Appearance age appropriate, casually dressed, dressed appropriately, looks stated age, bearded    Behavior pleasant, cooperative, calm   Speech normal rate, normal volume, normal pitch   Mood "OK"   Affect blunted   Thought Processes organized, logical, goal directed   Associations intact associations   Thought Content no overt delusions   Perceptual Disturbances: no auditory hallucinations, no visual hallucinations   Abnormal Thoughts  Risk Potential Suicidal ideation - None at present  Homicidal ideation - None at present  Potential for aggression - No   Orientation oriented to person, place, time/date and situation   Memory recent and remote memory grossly intact   Consciousness alert and awake   Attention Span Concentration Span attention span and concentration are age appropriate   Intellect appears to be of average intelligence   Insight fair   Judgement fair   Muscle Strength and  Gait normal gait and normal balance   Motor activity no abnormal movements   Language no difficulty naming common objects   Fund of Knowledge adequate knowledge of current events   Pain none   Pain Scale 0       Laboratory Results: I have personally reviewed all pertinent laboratory/tests results    Recent Labs (last 2 months):   No visits with results within 2 Month(s) from this visit     Latest known visit with results is:   Appointment on 11/19/2021   Component Date Value   • Cholesterol 11/19/2021 123    • Triglycerides 11/19/2021 62    • HDL, Direct 11/19/2021 43    • LDL Calculated 11/19/2021 68    • Non-HDL-Chol (CHOL-HDL) 11/19/2021 80    • Sodium 11/19/2021 139    • Potassium 11/19/2021 4 1    • Chloride 11/19/2021 106    • CO2 11/19/2021 29    • ANION GAP 11/19/2021 4    • BUN 11/19/2021 19    • Creatinine 11/19/2021 0 93    • Glucose, Fasting 11/19/2021 94    • Calcium 11/19/2021 9 1    • AST 11/19/2021 27    • ALT 11/19/2021 40    • Alkaline Phosphatase 11/19/2021 77    • Total Protein 11/19/2021 7 8    • Albumin 11/19/2021 4 3    • Total Bilirubin 11/19/2021 0 78    • eGFR 11/19/2021 116    • Hemoglobin A1C 11/19/2021 5 3    • EAG 11/19/2021 105        Suicide/Homicide Risk Assessment:    The following interventions are recommended: no intervention changes needed      Lethality Statement:    Based on today's assessment and clinical criteria, Josiah Vaughn contracts for safety and is not an imminent risk of harm to self or others  Outpatient level of care is deemed appropriate at this current time  Maria De Jesus Mcclendon understands that if they can no longer contract for safety, they need to call the office or report to their nearest Emergency Room for immediate evaluation  Assessment/Plan:     Isabel Harden a 22 y  o  male with past psychiatric history significant for ASD, ADHD, and PDD who was personally seen and evaluated today at the [address] [place] [address] E outpatient clinic for follow-up and medication management  Bryon has been involved in psychiatric/neurologic treatment since the age of 10 and has been seeing Dr Aime Lentz at [facility] for the last 15 years  Maria De Jesus Mcclendon has been trialed on numerous psychotropic medications, including Risperdal, Straterra, Klonopin, and Ritalin  He endorses ongoing compliance with current medication regimen that consists of Risperdal 1mg AM, 2mg PM and Straterra 80mg Daily  Father reports that Maria De Jesus Mcclendon was excessively hyper, impulsive, and dysregulated during periods of psychostimulant use in the past, so these agents should be avoided  Maria De Jesus Mcclendon is currently in the process of being linked with Neurology as well secondary to PDD and ASD   Maria De Jesus Mcclendon has a history of IEP/504 plans, ritualistic behaviors, and is often rigid and inflexible in his cognitive struggle  Father reports a pervasive difficulty with social interaction and emotional reciprocity  Mariza Sheriff is currently involved in numerous community-based resources to assist with life skills and to foster greater autonomy and independence  He meets with Landon Farley from [facility] and Crispin Sarabia () frequently and finds pleasure in this  Father denies recent behavioral outbursts or physical aggression  Mariza Ser' sister, who also has ASD, appears to be a major trigger for Mariza Ser  However, father denies physical altercations  Bryon denies historical neurovegetative symptomatology suggestive of major depressive disorder or dysthymia  Bryon vehemently denies any acute or chronic history suggestive of an underlying affective (bipolar) organization  Bryon denies historical symptomatology suggestive of an underlying psychotic process  Bryon denies historical symptomatology suggestive of PTSD, OCD, or disordered eating       Today's Plan:     Psychopharmacologically, Asif reports tolerability and benefit from his current medication regimen  Although his behavior has been more erratic at home, Dad and Kimmie Sharma deny need for medication optimization  They believe greater routine, structure, and time outside the home will assist with normalizing his behavior and mood  As such, no acute indication to change medication regimen          DSM-V Diagnoses:      1  ) Autism Spectrum Disorder  2 ) ADHD  3 ) PDD by history         Treatment Recommendations/Precautions:        1  ) Autism Spectrum Disorder  - Continue Risperdal 1mg AM, 2mg PM secondary to agitation and irritability associated with ASD  - Continue engagement in weekly socialization with Taz via [facility] - learning life skills, etc    - Psychoeducation provided regarding the importance of exercise and healthy dietary choices and their impact on mood, energy, and motivation  - Counseled to avoid ETOH, illict substances, and nicotine secondary to the detrimental effects of these substances on mental and physical health  - Encouraged to engage in non-verbal forms of therapy such as art therapy, music therapy, and mindfulness     2 ) ADHD  - Continue Straterra 80mg Daily              - Hx of paradoxical excitation with psychostimulant use (Ritalin) - avoid         3 ) PDD by history   - Continue Risperdal 1mg AM, 2mg PM secondary to agitation and irritability associated with ASD        Medication management every 4 months  Aware of need to follow up with family physician for medical issues  Aware of 24 hour and weekend coverage for urgent situations accessed by calling [facility] main practice number    Medications Risks/Benefits      Risks, Benefits And Possible Side Effects Of Medications:    Risks, benefits, and possible side effects of medications explained to ALEXIA including risk of parkinsonian symptoms, Tardive Dyskinesia and metabolic syndrome related to treatment with antipsychotic medications  He verbalizes understanding and agreement for treatment  Controlled Medication Discussion:     No recent records found for controlled prescriptions according to [place] Prescription Drug Monitoring Program    Psychotherapy Provided:     Individual psychotherapy provided: Yes  Counseling was provided during the session today for 16 minutes  Medications, treatment progress and treatment plan reviewed with ALEXIA  Medication education provided to ALEXIA  Recent stressors discussed with ALEXIA including family problems, family conflict, family issues, grandfather's's illness, limited support, social difficulties, everyday stressors and occasional anxiety  Coping strategies reviewed with ALEXIA  Educated on importance of medication and treatment compliance  Importance of follow up with family physician for medical issues reviewed with ALEXIA  Supportive therapy provided        Treatment Plan:    Completed and signed during the session: Yes - Treatment Plan done but not signed at time of office visit due to:  Plan reviewed in person and verbal consent given due to [place] social distancing      Visit Time    Visit Start Time: 2:28 PM  Visit Stop Time: 2:59 PM  Total Visit Duration: 31 minutes     The total visit duration detailed above includes: patient engagement, medication management, psychotherapy/counseling, discussion regarding treatment goals, and coordination of care  Note Share Disclaimer:      This note was not shared with the patient due to reasonable likelihood of causing patient harm      Ta Viera MD  Board Certified Diplomate of the American Board of Psychiatry and Neurology  11/07/22

## 2022-11-07 NOTE — BH TREATMENT PLAN
TREATMENT PLAN (Medication Management Only)        [facility]    Name and Date of Birth:  Autumn Silva 20 y o  2000  Date of Treatment Plan: November 7, 2022  Diagnosis/Diagnoses:    1  Autism spectrum disorder    2  Anxiety    3  Pervasive developmental disorder, residual    4  ADHD (attention deficit hyperactivity disorder), combined type      Strengths/Personal Resources for Self-Care: supportive family, supportive friends, taking medications as prescribed, ability to adapt to life changes, ability to communicate needs, ability to communicate well, ability to listen, ability to reason, motivation for treatment, ability to negotiate basic needs, self-reliance, sense of humor  Area/Areas of need (in own words): mood instability, attention and concentration problems, ADHD symptoms  1  Long Term Goal: maintain control of ADHD symptoms  Target Date:6 months - 5/7/2023  Person/Persons responsible for completion of goal: Mariza Sheriff  2  Short Term Objective (s) - How will we reach this goal?:   A  Provider new recommended medication/dosage changes and/or continue medication(s): continue current medications as prescribed  B  Attend medication management appointments regularly  C  Take psychiatric medications responsibly  D  Continue gratitude journal daily  E  Spend more time outside the house and being active - take 2 days per week to ride bike or walk around community    Target Date:6 months - 5/7/2023  Person/Persons Responsible for Completion of Goal: Mariza Sheriff  Progress Towards Goals: stable, continuing treatment  Treatment Modality: medication management every 4 months  Review due 180 days from date of this plan: 6 months - 5/7/2023  Expected length of service: ongoing treatment  My Physician/PA/NP and I have developed this plan together and I agree to work on the goals and objectives   I understand the treatment goals that were developed for my treatment  Treatment Plan completed with assistance and input from patient and verbal consent provided  Treatment plan was not signed at time of office visit secondary to COVID-19 social distancing guidelines

## 2022-11-10 ENCOUNTER — TELEPHONE (OUTPATIENT)
Dept: PSYCHIATRY | Facility: CLINIC | Age: 22
End: 2022-11-10

## 2022-11-11 ENCOUNTER — OFFICE VISIT (OUTPATIENT)
Dept: INTERNAL MEDICINE CLINIC | Age: 22
End: 2022-11-11

## 2022-11-11 VITALS
HEART RATE: 94 BPM | BODY MASS INDEX: 24.42 KG/M2 | TEMPERATURE: 98 F | OXYGEN SATURATION: 97 % | DIASTOLIC BLOOD PRESSURE: 70 MMHG | WEIGHT: 174.4 LBS | HEIGHT: 71 IN | SYSTOLIC BLOOD PRESSURE: 100 MMHG

## 2022-11-11 DIAGNOSIS — Z23 ENCOUNTER FOR IMMUNIZATION: Primary | ICD-10-CM

## 2022-11-11 DIAGNOSIS — Z00.00 MEDICARE ANNUAL WELLNESS VISIT, SUBSEQUENT: ICD-10-CM

## 2022-11-11 DIAGNOSIS — Z00.00 WELL ADULT EXAM: ICD-10-CM

## 2022-11-11 DIAGNOSIS — F90.2 ADHD (ATTENTION DEFICIT HYPERACTIVITY DISORDER), COMBINED TYPE: ICD-10-CM

## 2022-11-11 DIAGNOSIS — F84.0 AUTISM SPECTRUM DISORDER: ICD-10-CM

## 2022-11-11 PROBLEM — R73.9 HYPERGLYCEMIA: Status: RESOLVED | Noted: 2019-10-16 | Resolved: 2022-11-11

## 2022-11-11 PROBLEM — R13.10 DYSPHAGIA: Status: RESOLVED | Noted: 2021-04-14 | Resolved: 2022-11-11

## 2022-11-11 NOTE — PROGRESS NOTES
Assessment and Plan:     Problem List Items Addressed This Visit        Other    Well adult exam    ADHD (attention deficit hyperactivity disorder), combined type    Autism spectrum disorder      Other Visit Diagnoses     Encounter for immunization    -  Primary    Relevant Orders    influenza vaccine, quadrivalent, 0 5 mL, preservative-free, for adult and pediatric patients 6 mos+ (AFLURIA, FLUARIX, [address], [facility]) (Completed)    Medicare annual wellness visit, subsequent              Depression Screening and Follow-up Plan: Patient was screened for depression during today's encounter  They screened negative with a PHQ-2 score of 0  Preventive health issues were discussed with patient, and age appropriate screening tests were ordered as noted in patient's After Visit Summary  Personalized health advice and appropriate referrals for health education or preventive services given if needed, as noted in patient's After Visit Summary  History of Present Illness:     Patient presents for a Medicare Wellness Visit    Patient is basically healthy except for his autism and ADHD  For which he takes meds controlled by psych  Patient Care Team:  Adrian Germain MD as PCP - General     Review of Systems:     Review of Systems   Constitutional: Negative for chills, fatigue, fever and unexpected weight change  HENT: Negative for congestion, ear pain, hearing loss, postnasal drip, sinus pressure, sore throat, trouble swallowing and voice change  Eyes: Negative for visual disturbance  Respiratory: Negative for cough, chest tightness, shortness of breath and wheezing  Cardiovascular: Negative for chest pain, palpitations and leg swelling  Gastrointestinal: Negative for abdominal distention, abdominal pain, anal bleeding, blood in stool, constipation, diarrhea and nausea  Endocrine: Negative for cold intolerance, polydipsia, polyphagia and polyuria     Genitourinary: Negative for dysuria, flank pain, frequency, hematuria and urgency  Musculoskeletal: Negative for arthralgias, back pain, gait problem, joint swelling, myalgias and neck pain  Skin: Negative for rash  Allergic/Immunologic: Negative for immunocompromised state  Neurological: Negative for dizziness, syncope, facial asymmetry, weakness, light-headedness, numbness and headaches  Hematological: Negative for adenopathy  Psychiatric/Behavioral: Positive for behavioral problems and decreased concentration  Negative for confusion, sleep disturbance and suicidal ideas  The patient is hyperactive           Problem List:     Patient Active Problem List   Diagnosis   • Well adult exam   • ADHD (attention deficit hyperactivity disorder), combined type   • Anxiety   • Autism spectrum disorder   • Pervasive developmental disorder, residual   • Hyperglycemia      Past Medical and Surgical History:     Past Medical History:   Diagnosis Date   • ADHD (attention deficit hyperactivity disorder)    • Autism spectrum disorder    • GERD (gastroesophageal reflux disease)      Past Surgical History:   Procedure Laterality Date   • NO PAST SURGERIES        Family History:     Family History   Problem Relation Age of Onset   • Lung cancer Mother    • Diabetes Father    • Alcohol abuse Father         in sustained remission   • ADD / ADHD Sister    • Autism Sister    • Cancer Paternal Grandmother    • No Known Problems Brother    • No Known Problems Sister    • No Known Problems Sister    • No Known Problems Sister       Social History:     Social History     Socioeconomic History   • Marital status: Single     Spouse name: None   • Number of children: None   • Years of education: None   • Highest education level: None   Occupational History   • None   Tobacco Use   • Smoking status: Never Smoker   • Smokeless tobacco: Never Used   Substance and Sexual Activity   • Alcohol use: No   • Drug use: Never   • Sexual activity: None   Other Topics Concern   • None   Social History Narrative   • None     Social Determinants of Health     Financial Resource Strain: Low Risk    • Difficulty of Paying Living Expenses: Not very hard   Food Insecurity: Not on file   Transportation Needs: No Transportation Needs   • Lack of Transportation (Medical): No   • Lack of Transportation (Non-Medical): No   Physical Activity: Not on file   Stress: Not on file   Social Connections: Not on file   Intimate Partner Violence: Not on file   Housing Stability: Not on file      Medications and Allergies:     Current Outpatient Medications   Medication Sig Dispense Refill   • atomoxetine (STRATTERA) 80 MG capsule Take 1 capsule (80 mg total) by mouth daily 90 capsule 5   • MULTIPLE VITAMINS-MINERALS ER PO Take by mouth     • omeprazole (PriLOSEC) 20 mg delayed release capsule Take 1 capsule (20 mg total) by mouth daily before breakfast 30 capsule 5   • risperiDONE (RisperDAL) 1 mg tablet Take 1 tablet (1 mg total) by mouth 3 (three) times a day 270 tablet 5     No current facility-administered medications for this visit       No Known Allergies   Immunizations:     Immunization History   Administered Date(s) Administered   • COVID-19 PFIZER VACCINE 0 3 ML IM 04/02/2021, 04/24/2021   • DTP 1999, 1999, 1999, 09/15/2003   • HPV Quadrivalent 01/29/2016   • Hep B, adult 1999, 1999, 1999   • Hib (PRP-OMP) 1999, 1999, 1999, 02/08/2000   • IPV 1999, 1999, 1999, 11/03/2004   • Influenza Quadrivalent, 6-35 Months IM 11/01/2017   • Influenza, injectable, quadrivalent, preservative free 0 5 mL 11/13/2020, 11/11/2022   • Influenza, recombinant, quadrivalent,injectable, preservative free 09/17/2018, 10/16/2019, 11/10/2021   • Influenza, seasonal, injectable 11/20/2013, 10/20/2014, 01/05/2016, 10/21/2016   • MMR 09/15/2003, 12/13/2004   • Meningococcal, Unknown Serogroups 01/04/2011, 02/04/2015   • Tdap 01/04/2011, 03/31/2014   • Varicella 02/08/2000, 01/12/2007      Health Maintenance:         Topic Date Due   • Hepatitis C Screening  Never done   • HIV Screening  Never done         Topic Date Due   • HPV Vaccine (2 - Male 3-dose series) 02/26/2016   • COVID-19 Vaccine (3 - Booster for Pfizer series) 09/24/2021      Medicare Screening Tests and Risk Assessments:     Mariza Sheriff is here for his Subsequent Wellness visit  Last Medicare Wellness visit information reviewed, patient interviewed and updates made to the record today  Health Risk Assessment:   Patient rates overall health as excellent  Patient feels that their physical health rating is much better  Patient is satisfied with their life  Eyesight was rated as same  Hearing was rated as same  Patient feels that their emotional and mental health rating is much better  Patients states they are sometimes angry  Patient states they are sometimes unusually tired/fatigued  Pain experienced in the last 7 days has been none  Patient states that he has experienced no weight loss or gain in last 6 months  Depression Screening:   PHQ-2 Score: 0      Fall Risk Screening: In the past year, patient has experienced: no history of falling in past year      Home Safety:  Patient does not have trouble with stairs inside or outside of their home  Patient has working smoke alarms and has working carbon monoxide detector  Home safety hazards include: none  Nutrition:   Current diet is Regular  Medications:   Patient is not currently taking any over-the-counter supplements  Patient is able to manage medications  Activities of Daily Living (ADLs)/Instrumental Activities of Daily Living (IADLs):   Walk and transfer into and out of bed and chair?: Yes  Dress and groom yourself?: Yes    Bathe or shower yourself?: Yes    Feed yourself?  Yes  Do your laundry/housekeeping?: Yes  Manage your money, pay your bills and track your expenses?: Yes  Make your own meals?: Yes    Do your own shopping?: Yes    Durable Medical Equipment Suppliers  none    Previous Hospitalizations:   Any hospitalizations or ED visits within the last 12 months?: No      Advance Care Planning:   Living will: No    Durable POA for healthcare: No    Advanced directive: No      Cognitive Screening:   Provider or family/friend/caregiver concerned regarding cognition?: No    PREVENTIVE SCREENINGS      Cardiovascular Screening:    General: Screening Current      Diabetes Screening:     General: Screening Current      Colorectal Cancer Screening:     General: Screening Not Indicated      Prostate Cancer Screening:    General: Screening Not Indicated      Osteoporosis Screening:    General: Screening Not Indicated      Abdominal Aortic Aneurysm (AAA) Screening:        General: Screening Not Indicated      Lung Cancer Screening:     General: Screening Not Indicated      Hepatitis C Screening:    General: Screening Not Indicated    Screening, Brief Intervention, and Referral to Treatment (SBIRT)    Screening  Typical number of drinks in a day: 0  Typical number of drinks in a week: 0  Interpretation: Low risk drinking behavior  AUDIT-C Screenin) How often did you have a drink containing alcohol in the past year? never  2) How many drinks did you have on a typical day when you were drinking in the past year? 0  3) How often did you have 6 or more drinks on one occasion in the past year? never    AUDIT-C Score: 0  Interpretation: Score 0-3 (male): Negative screen for alcohol misuse    Single Item Drug Screening:  How often have you used an illegal drug (including marijuana) or a prescription medication for non-medical reasons in the past year? never    Single Item Drug Screen Score: 0  Interpretation: Negative screen for possible drug use disorder    Other Counseling Topics:   Regular weightbearing exercise and calcium and vitamin D intake       No exam data present     Physical Exam:     /70 (BP Location: Left arm, Patient Position: Sitting)   Pulse 94 Temp 98 °F (36 7 °C) (Tympanic)   Ht 5' 11" (1 803 m)   Wt 79 1 kg (174 lb 6 4 oz)   SpO2 97%   BMI 24 32 kg/m²     Physical Exam  Vitals and nursing note reviewed  Constitutional:       Appearance: He is well-developed  HENT:      Head: Normocephalic and atraumatic  Nose: Nose normal       Mouth/Throat:      Mouth: Mucous membranes are moist    Eyes:      Conjunctiva/sclera: Conjunctivae normal    Cardiovascular:      Rate and Rhythm: Normal rate and regular rhythm  Heart sounds: No murmur heard  Pulmonary:      Effort: Pulmonary effort is normal  No respiratory distress  Breath sounds: Normal breath sounds  Abdominal:      Palpations: Abdomen is soft  Tenderness: There is no abdominal tenderness  Musculoskeletal:         General: Normal range of motion  Cervical back: Neck supple  Skin:     General: Skin is warm and dry  Neurological:      General: No focal deficit present  Mental Status: He is alert and oriented to person, place, and time  Mental status is at baseline     Psychiatric:         Mood and Affect: Mood normal       Comments: Hyperactive          Tobias Julian MD

## 2022-11-11 NOTE — PATIENT INSTRUCTIONS
Medicare Preventive Visit Patient Instructions  Thank you for completing your Welcome to Medicare Visit or Medicare Annual Wellness Visit today  Your next wellness visit will be due in one year (11/12/2023)  The screening/preventive services that you may require over the next 5-10 years are detailed below  Some tests may not apply to you based off risk factors and/or age  Screening tests ordered at today's visit but not completed yet may show as past due  Also, please note that scanned in results may not display below  Preventive Screenings:  Service Recommendations Previous Testing/Comments   Colorectal Cancer Screening  · Colonoscopy    · Fecal Occult Blood Test (FOBT)/Fecal Immunochemical Test (FIT)  · Fecal DNA/Cologuard Test  · Flexible Sigmoidoscopy Age: 39-70 years old   Colonoscopy: every 10 years (May be performed more frequently if at higher risk)  OR  FOBT/FIT: every 1 year  OR  Cologuard: every 3 years  OR  Sigmoidoscopy: every 5 years  Screening may be recommended earlier than age 39 if at higher risk for colorectal cancer  Also, an individualized decision between you and your healthcare provider will decide whether screening between the ages of 74-80 would be appropriate   Colonoscopy: Not on file  FOBT/FIT: Not on file  Cologuard: Not on file  Sigmoidoscopy: Not on file          Prostate Cancer Screening Individualized decision between patient and health care provider in men between ages of 53-78   Medicare will cover every 12 months beginning on the day after your 50th birthday PSA: No results in last 5 years           Hepatitis C Screening Once for adults born between [birth year] and 1965  More frequently in patients at high risk for Hepatitis C Hep C Antibody: Not on file        Diabetes Screening 1-2 times per year if you're at risk for diabetes or have pre-diabetes Fasting glucose: 94 mg/dL (11/19/2021)  A1C: 5 3 % (11/19/2021)      Cholesterol Screening Once every 5 years if you don't have a lipid disorder  May order more often based on risk factors  Lipid panel: 11/19/2021         Other Preventive Screenings Covered by Medicare:  1  Abdominal Aortic Aneurysm (AAA) Screening: covered once if your at risk  You're considered to be at risk if you have a family history of AAA or a male between the age of 73-68 who smoking at least 100 cigarettes in your lifetime  2  Lung Cancer Screening: covers low dose CT scan once per year if you meet all of the following conditions: (1) Age 50-69; (2) No signs or symptoms of lung cancer; (3) Current smoker or have quit smoking within the last 15 years; (4) You have a tobacco smoking history of at least 20 pack years (packs per day x number of years you smoked); (5) You get a written order from a healthcare provider  3  Glaucoma Screening: covered annually if you're considered high risk: (1) You have diabetes OR (2) Family history of glaucoma OR (3)  aged 48 and older OR (3)  American aged 72 and older  3  Osteoporosis Screening: covered every 2 years if you meet one of the following conditions: (1) Have a vertebral abnormality; (2) On glucocorticoid therapy for more than 3 months; (3) Have primary hyperparathyroidism; (4) On osteoporosis medications and need to assess response to drug therapy  5  HIV Screening: covered annually if you're between the age of 12-76  Also covered annually if you are younger than 13 and older than 72 with risk factors for HIV infection  For pregnant patients, it is covered up to 3 times per pregnancy      Immunizations:  Immunization Recommendations   Influenza Vaccine Annual influenza vaccination during flu season is recommended for all persons aged >= 6 months who do not have contraindications   Pneumococcal Vaccine   * Pneumococcal conjugate vaccine = PCV13 (Prevnar 13), PCV15 (Vaxneuvance), PCV20 (Prevnar 20)  * Pneumococcal polysaccharide vaccine = PPSV23 (Pneumovax) Adults 25-60 years old: 1-3 doses may be recommended based on certain risk factors  Adults 72 years old: 1-2 doses may be recommended based off what pneumonia vaccine you previously received   Hepatitis B Vaccine 3 dose series if at intermediate or high risk (ex: diabetes, end stage renal disease, liver disease)   Tetanus (Td) Vaccine - COST NOT COVERED BY MEDICARE PART B Following completion of primary series, a booster dose should be given every 10 years to maintain immunity against tetanus  Td may also be given as tetanus wound prophylaxis  Tdap Vaccine - COST NOT COVERED BY MEDICARE PART B Recommended at least once for all adults  For pregnant patients, recommended with each pregnancy  Shingles Vaccine (Shingrix) - COST NOT COVERED BY MEDICARE PART B  2 shot series recommended in those aged 48 and above     Health Maintenance Due:      Topic Date Due   • Hepatitis C Screening  Never done   • HIV Screening  Never done     Immunizations Due:      Topic Date Due   • HPV Vaccine (2 - Male 3-dose series) 02/26/2016   • COVID-19 Vaccine (3 - Booster for Pfizer series) 09/24/2021     Advance Directives   What are advance directives? Advance directives are legal documents that state your wishes and plans for medical care  These plans are made ahead of time in case you lose your ability to make decisions for yourself  Advance directives can apply to any medical decision, such as the treatments you want, and if you want to donate organs  What are the types of advance directives? There are many types of advance directives, and each state has rules about how to use them  You may choose a combination of any of the following:  · Living will: This is a written record of the treatment you want  You can also choose which treatments you do not want, which to limit, and which to stop at a certain time  This includes surgery, medicine, IV fluid, and tube feedings  · Durable power of  for healthcare [facility]):   This is a written record that states who you want to make healthcare choices for you when you are unable to make them for yourself  This person, called a proxy, is usually a family member or a friend  You may choose more than 1 proxy  · Do not resuscitate (DNR) order:  A DNR order is used in case your heart stops beating or you stop breathing  It is a request not to have certain forms of treatment, such as CPR  A DNR order may be included in other types of advance directives  · Medical directive: This covers the care that you want if you are in a coma, near death, or unable to make decisions for yourself  You can list the treatments you want for each condition  Treatment may include pain medicine, surgery, blood transfusions, dialysis, IV or tube feedings, and a ventilator (breathing machine)  · Values history: This document has questions about your views, beliefs, and how you feel and think about life  This information can help others choose the care that you would choose  Why are advance directives important? An advance directive helps you control your care  Although spoken wishes may be used, it is better to have your wishes written down  Spoken wishes can be misunderstood, or not followed  Treatments may be given even if you do not want them  An advance directive may make it easier for your family to make difficult choices about your care  © Copyright [employer] 2018 Information is for End User's use only and may not be sold, redistributed or otherwise used for commercial purposes   All illustrations and images included in CareNotes® are the copyrighted property of [employer]  or [address] [place] [address] [employer]

## 2022-11-15 ENCOUNTER — TELEPHONE (OUTPATIENT)
Dept: PSYCHIATRY | Facility: CLINIC | Age: 22
End: 2022-11-15

## 2022-11-15 NOTE — TELEPHONE ENCOUNTER
Left a message at the request of [employer] to have Shante De Jesus from [employer] and [facility] [employer] to call back with address  [facility] signed did not have address which is needed for process of medical records request  Left my name and number in message

## 2022-11-16 NOTE — TELEPHONE ENCOUNTER
Dimitris Hernandez from [facility] called and gave mailing address to this writer  Medical records processed 11/15/22

## 2023-01-13 ENCOUNTER — OFFICE VISIT (OUTPATIENT)
Dept: INTERNAL MEDICINE CLINIC | Age: 23
End: 2023-01-13

## 2023-01-13 VITALS
TEMPERATURE: 98.4 F | OXYGEN SATURATION: 98 % | HEIGHT: 71 IN | SYSTOLIC BLOOD PRESSURE: 128 MMHG | WEIGHT: 178.8 LBS | DIASTOLIC BLOOD PRESSURE: 82 MMHG | HEART RATE: 104 BPM | BODY MASS INDEX: 25.03 KG/M2

## 2023-01-13 DIAGNOSIS — R07.89 OTHER CHEST PAIN: Primary | ICD-10-CM

## 2023-01-13 DIAGNOSIS — F84.9 PERVASIVE DEVELOPMENTAL DISORDER, RESIDUAL: ICD-10-CM

## 2023-01-13 DIAGNOSIS — F84.0 AUTISM SPECTRUM DISORDER: ICD-10-CM

## 2023-01-13 NOTE — ASSESSMENT & PLAN NOTE
Patient complains of 2 separate episodes of sharp chest discomfort unrelated to anything    Both he and his father are concerned it might of been related to [place] shot last summer personally feel it is not any significant problem however I did an EKG for completeness that was normal

## 2023-01-13 NOTE — PROGRESS NOTES
Name: Baltazar Blake      : 2000      MRN: [id number]  Encounter Provider: Keila Rossi MD  Encounter Date: 2023   Encounter department: [address] [place] [address]     1  Other chest pain  Assessment & Plan:  Patient complains of 2 separate episodes of sharp chest discomfort unrelated to anything  Both he and his father are concerned it might of been related to [place] shot last summer personally feel it is not any significant problem however I did an EKG for completeness that was normal    Orders:  -     POCT ECG    2  Pervasive developmental disorder, residual    3  Autism spectrum disorder  Assessment & Plan:  Only both he and his parents are significantly anxious             Subjective      Chest Pain   This is a new problem  The current episode started 1 to 4 weeks ago  The onset quality is undetermined  The problem occurs rarely  The problem has been resolved  The pain is present in the substernal region  The pain is at a severity of 3/10  The pain is mild  The quality of the pain is described as sharp  The pain does not radiate  Pertinent negatives include no abdominal pain, back pain, cough, dizziness, fever, headaches, nausea, numbness, palpitations, shortness of breath or weakness  Associated symptoms comments: No associated symptoms  The pain is aggravated by nothing  He has tried nothing for the symptoms  There are no known risk factors  Past medical history comments: No significant vascular or respiratory symptoms   His family medical history is significant for diabetes and hypertension  Prior workup: ekg  Review of Systems   Constitutional: Negative for chills, fatigue, fever and unexpected weight change  HENT: Negative for congestion, ear pain, hearing loss, postnasal drip, sinus pressure, sore throat, trouble swallowing and voice change  Eyes: Negative for visual disturbance     Respiratory: Negative for cough, chest tightness, shortness of breath and wheezing  Cardiovascular: Positive for chest pain  Negative for palpitations and leg swelling  Gastrointestinal: Negative for abdominal distention, abdominal pain, anal bleeding, blood in stool, constipation, diarrhea and nausea  Endocrine: Negative for cold intolerance, polydipsia, polyphagia and polyuria  Genitourinary: Negative for dysuria, flank pain, frequency, hematuria and urgency  Musculoskeletal: Negative for arthralgias, back pain, gait problem, joint swelling, myalgias and neck pain  Skin: Negative for rash  Allergic/Immunologic: Negative for immunocompromised state  Neurological: Negative for dizziness, syncope, facial asymmetry, weakness, light-headedness, numbness and headaches  Hematological: Negative for adenopathy  Psychiatric/Behavioral: Positive for behavioral problems and decreased concentration  Negative for confusion, sleep disturbance and suicidal ideas  Current Outpatient Medications on File Prior to Visit   Medication Sig   • atomoxetine (STRATTERA) 80 MG capsule Take 1 capsule (80 mg total) by mouth daily   • MULTIPLE VITAMINS-MINERALS ER PO Take by mouth   • omeprazole (PriLOSEC) 20 mg delayed release capsule Take 1 capsule (20 mg total) by mouth daily before breakfast   • risperiDONE (RisperDAL) 1 mg tablet Take 1 tablet (1 mg total) by mouth 3 (three) times a day       Objective     /82 (BP Location: Left arm, Patient Position: Sitting)   Pulse 104   Temp 98 4 °F (36 9 °C) (Tympanic)   Ht 5' 11" (1 803 m)   Wt 81 1 kg (178 lb 12 8 oz)   SpO2 98%   BMI 24 94 kg/m²     Physical Exam  Constitutional:       General: He is not in acute distress  Appearance: He is well-developed  HENT:      Right Ear: External ear normal       Left Ear: External ear normal       Nose: Nose normal       Mouth/Throat:      Pharynx: No oropharyngeal exudate  Eyes:      Pupils: Pupils are equal, round, and reactive to light  Neck:      Thyroid: No thyromegaly  Vascular: No JVD  Cardiovascular:      Rate and Rhythm: Normal rate and regular rhythm  No extrasystoles are present  Heart sounds: Normal heart sounds  No murmur heard  No systolic murmur is present  No diastolic murmur is present  No gallop  No S3 or S4 sounds  Pulmonary:      Effort: Pulmonary effort is normal  No respiratory distress  Breath sounds: Normal breath sounds  No decreased breath sounds, wheezing or rales  Abdominal:      General: Bowel sounds are normal  There is no distension  Palpations: Abdomen is soft  There is no mass  Tenderness: There is no abdominal tenderness  Musculoskeletal:         General: No tenderness  Normal range of motion  Cervical back: Normal range of motion and neck supple  Lymphadenopathy:      Cervical: No cervical adenopathy  Skin:     General: Skin is warm and dry  Findings: No rash  Neurological:      Mental Status: He is alert and oriented to person, place, and time  Cranial Nerves: No cranial nerve deficit  Coordination: Coordination normal    Psychiatric:         Behavior: Behavior normal          Thought Content:  Thought content normal          Judgment: Judgment normal        Aj Arriaza MD

## 2023-01-18 ENCOUNTER — OFFICE VISIT (OUTPATIENT)
Dept: INTERNAL MEDICINE CLINIC | Age: 23
End: 2023-01-18

## 2023-01-18 VITALS
BODY MASS INDEX: 24.22 KG/M2 | SYSTOLIC BLOOD PRESSURE: 110 MMHG | OXYGEN SATURATION: 98 % | HEIGHT: 71 IN | HEART RATE: 110 BPM | TEMPERATURE: 98.4 F | DIASTOLIC BLOOD PRESSURE: 66 MMHG | WEIGHT: 173 LBS

## 2023-01-18 DIAGNOSIS — R05.1 ACUTE COUGH: Primary | ICD-10-CM

## 2023-01-18 RX ORDER — METHYLPREDNISOLONE 4 MG/1
TABLET ORAL
Qty: 21 EACH | Refills: 0 | Status: SHIPPED | OUTPATIENT
Start: 2023-01-18

## 2023-01-18 RX ORDER — AZITHROMYCIN 250 MG/1
TABLET, FILM COATED ORAL
Qty: 6 TABLET | Refills: 0 | Status: SHIPPED | OUTPATIENT
Start: 2023-01-18 | End: 2023-01-23

## 2023-01-19 PROBLEM — R05.1 ACUTE COUGH: Status: ACTIVE | Noted: 2023-01-19

## 2023-01-19 PROBLEM — J40 BRONCHITIS: Status: RESOLVED | Noted: 2023-01-19 | Resolved: 2023-01-19

## 2023-01-19 PROBLEM — J40 BRONCHITIS: Status: ACTIVE | Noted: 2023-01-19

## 2023-01-19 NOTE — ASSESSMENT & PLAN NOTE
Patient developed an acute eye cough over the last several days but no fever    Try Medrol Dosepak along with a Z-Juan Daniel

## 2023-01-19 NOTE — PROGRESS NOTES
Name: Srinivas Mckeon      : 2000      MRN: [id number]  Encounter Provider: Angela Hairston MD  Encounter Date: 2023   Encounter department: [address] [place] [address]  Acute cough  -     azithromycin (ZITHROMAX) 250 mg tablet; Take 2 tablets today then 1 tablet daily x 4 days  -     dextromethorphan-guaifenesin (MUCINEX DM)  MG per 12 hr tablet; Take 1 tablet by mouth every 12 (twelve) hours  -     methylPREDNISolone 4 MG tablet therapy pack; Use as directed on package        Depression Screening and Follow-up Plan: Patient was screened for depression during today's encounter  They screened negative with a PHQ-2 score of 0  Subjective      Cough  This is a new problem  The current episode started in the past 7 days  The problem has been waxing and waning  The problem occurs every few minutes  The cough is non-productive  Associated symptoms include a sore throat  Pertinent negatives include no chest pain, chills, ear pain, fever, rash, shortness of breath or wheezing  The symptoms are aggravated by cold air  Review of Systems   Constitutional: Negative for chills and fever  HENT: Positive for sore throat  Negative for ear pain  Eyes: Negative for pain and visual disturbance  Respiratory: Positive for cough  Negative for shortness of breath and wheezing  Cardiovascular: Negative for chest pain and palpitations  Gastrointestinal: Negative for abdominal pain and vomiting  Genitourinary: Negative for dysuria and hematuria  Musculoskeletal: Negative for arthralgias and back pain  Skin: Negative for color change and rash  Neurological: Negative for seizures and syncope  Psychiatric/Behavioral: Positive for decreased concentration  The patient is hyperactive  All other systems reviewed and are negative        Current Outpatient Medications on File Prior to Visit   Medication Sig   • atomoxetine (STRATTERA) 80 MG capsule Take 1 capsule (80 mg total) by mouth daily   • MULTIPLE VITAMINS-MINERALS ER PO Take by mouth   • omeprazole (PriLOSEC) 20 mg delayed release capsule Take 1 capsule (20 mg total) by mouth daily before breakfast   • risperiDONE (RisperDAL) 1 mg tablet Take 1 tablet (1 mg total) by mouth 3 (three) times a day       Objective     /66 (BP Location: Left arm, Patient Position: Sitting, Cuff Size: Standard)   Pulse (!) 110   Temp 98 4 °F (36 9 °C)   Ht 5' 11" (1 803 m)   Wt 78 5 kg (173 lb)   SpO2 98%   BMI 24 13 kg/m²     Physical Exam  Constitutional:       General: He is not in acute distress  Appearance: He is well-developed  HENT:      Right Ear: External ear normal       Left Ear: External ear normal       Nose: Nose normal       Mouth/Throat:      Pharynx: Posterior oropharyngeal erythema present  No oropharyngeal exudate  Eyes:      Pupils: Pupils are equal, round, and reactive to light  Neck:      Thyroid: No thyromegaly  Vascular: No JVD  Cardiovascular:      Rate and Rhythm: Normal rate and regular rhythm  Heart sounds: Normal heart sounds  No murmur heard  No gallop  Pulmonary:      Effort: Pulmonary effort is normal  No respiratory distress  Breath sounds: Normal breath sounds  No wheezing or rales  Abdominal:      General: Bowel sounds are normal  There is no distension  Palpations: Abdomen is soft  There is no mass  Tenderness: There is no abdominal tenderness  Musculoskeletal:         General: No tenderness  Normal range of motion  Cervical back: Normal range of motion and neck supple  Lymphadenopathy:      Cervical: No cervical adenopathy  Skin:     Findings: No rash  Neurological:      Mental Status: He is alert and oriented to person, place, and time  Cranial Nerves: No cranial nerve deficit  Coordination: Coordination normal    Psychiatric:         Behavior: Behavior normal          Thought Content:  Thought content normal  Judgment: Judgment normal        Salma Mike MD

## 2023-01-19 NOTE — PATIENT INSTRUCTIONS
Acute Cough   AMBULATORY CARE:   An acute cough  can last up to 3 weeks  Common causes of an acute cough include a cold, allergies, or a lung infection  Seek care immediately if:   You have trouble breathing or feel short of breath  You cough up blood, or you see blood in your mucus  You faint or feel weak or dizzy  You have chest pain when you cough or take a deep breath  You have new wheezing  Contact your healthcare provider if:   You have a fever  Your cough lasts longer than 4 weeks  Your symptoms do not improve with treatment  You have questions or concerns about your condition or care  Treatment:  An acute cough usually goes away on its own  Ask your healthcare provider about medicines you can take to decrease your cough  You may need medicine to stop the cough, decrease swelling in your airways, or help open your airways  Medicine may also be given to help you cough up mucus  If you have an infection caused by bacteria, you may need antibiotics  Manage your symptoms:   Do not smoke and stay away from others who smoke  Nicotine and other chemicals in cigarettes and cigars can cause lung damage and make your cough worse  Ask your healthcare provider for information if you currently smoke and need help to quit  E-cigarettes or smokeless tobacco still contain nicotine  Talk to your healthcare provider before you use these products  Drink extra liquids as directed  Liquids will help thin and loosen mucus so you can cough it up  Liquids will also help prevent dehydration  Examples of good liquids to drink include water, fruit juice, and broth  Do not drink liquids that contain caffeine  Caffeine can increase your risk for dehydration  Ask your healthcare provider how much liquid to drink each day  Rest as directed  Do not do activities that make your cough worse, such as exercise  Use a humidifier or vaporizer    Use a cool mist humidifier or a vaporizer to increase air moisture in your home  This may make it easier for you to breathe and help decrease your cough  Eat 2 to 5 mL of honey 2 times each day  Honey can help thin mucus and decrease your cough  Use cough drops or lozenges  These can help decrease throat irritation and your cough  Follow up with your healthcare provider as directed:  Write down your questions so you remember to ask them during your visits  © Copyright [employer] 2022 Information is for End User's use only and may not be sold, redistributed or otherwise used for commercial purposes  All illustrations and images included in CareNotes® are the copyrighted property of [employer]  or [address] [place], [place] [address]bola gabrielle   The above information is an  only  It is not intended as medical advice for individual conditions or treatments  Talk to your doctor, nurse or pharmacist before following any medical regimen to see if it is safe and effective for you

## 2023-03-03 ENCOUNTER — TELEPHONE (OUTPATIENT)
Dept: PSYCHIATRY | Facility: CLINIC | Age: 23
End: 2023-03-03

## 2023-03-07 ENCOUNTER — OFFICE VISIT (OUTPATIENT)
Dept: PSYCHIATRY | Facility: CLINIC | Age: 23
End: 2023-03-07

## 2023-03-07 DIAGNOSIS — F84.0 AUTISM SPECTRUM DISORDER: ICD-10-CM

## 2023-03-07 DIAGNOSIS — F90.2 ADHD (ATTENTION DEFICIT HYPERACTIVITY DISORDER), COMBINED TYPE: Primary | ICD-10-CM

## 2023-03-07 DIAGNOSIS — F84.9 PERVASIVE DEVELOPMENTAL DISORDER, RESIDUAL: ICD-10-CM

## 2023-03-07 NOTE — PSYCH
MEDICATION MANAGEMENT NOTE        [facility]      Name and Date of Birth:  Virginia Sierra 23 y o  2000 MRN: [id number]    Date of Visit: March 7, 2023    Reason for Visit: Follow-up visit for medication management       SUBJECTIVE:    Virginia Sierra is a 24 y o  male with past psychiatric history significant for ASD, ADHD, and PDD who was personally seen and evaluated today at the [address] [place] [address] E outpatient clinic for follow-up and medication management  Dillan Alcala presents alongside his father who assists with history gathering  Carl Reid is pleasant yet shy  He completes assessment without difficulty  Dillan Alcala endorses compliance with psychotropic medication regimen consisting of Risperdal and Atomoxetine  He denies adverse medication side effects  Carl Reid describes his mood as "fine" today  He speaks to recent familial stressors, such as his sister moving to [place] and ways he is managing his emotions regarding this  Supportive therapy was provided  Carl Reid states that he is trying to spend more time outside the house  He is engaging with his ICM 2x per week and admits they "walk"  I spoke with Dad about the importance of exercise and he was encouraged to independently walk or ride his bike on 3 other days for total of 5 days per week with exercise  Acutely, Asif's sleep is appropriate ("9 hours")  His appetite is stable  His dietary choices are poor as he eats "mac and cheese and chocolate"  We spoke about this today and need for better dietary intake  Carl Reid denies SI/HI when asked  His energy and motivation remain unchanged  He does speak to future plans of returning to school, specifically at [employer]  Father requests accomodation letter today to be sent to [employer], requesting part-time status  This will be completed  Carl Reid is without crying spells or anhedonia  He reports adequate control of everyday stressors and denies pathologic anxiety or worry   He speaks to plans of "making a home movie" and is excited to share this on YouTube  Luna Shen continues to utilize daily gratitude journal but once again forgot to bring this today  He vows to remember for next visit  Asif's behavior at home has been appropriate  No recent mood swings or severe anger  We discussed ongoing need to limit consumption of political media to which dad agrees  Asif's focus and concentration on Amalia Flint is appropriate  He denies current need for medication change or intervention  During today's examination, Paola Galdamez does not exhibit objective evidence of cassie/hypomania or psychosis  Paola Galdamez denies recent ETOH or illicit substance abuse  He offers no further concerns  Current Rating Scores:     None completed today      Review Of Systems:      Constitutional negative   ENT negative   Cardiovascular negative   Respiratory negative   Gastrointestinal negative   Genitourinary negative   Musculoskeletal negative   Integumentary negative   Neurological negative   Endocrine negative   Other Symptoms none, all other systems are negative       Past Psychiatric History: (unchanged information from previous note copied and italicized) - Information that is bolded has been updated       Inpatient psychiatric admissions: Denies  Prior outpatient psychiatric linkage: Previously linked with Dr Erin Palm via [facility]  Past/current psychotherapy: Denies - Is actively involved in [facility] with assistant [address] who assists with life skills and counseling  History of suicidal attempts/gestures: Denies  History of violence/aggressive behaviors: No overt physical aggression but history of irritability and agitation with sister   Psychotropic medication trials: Klonopin, Ritalin (too stimulated, hyperactive), Risperdal, Strattera   Substance abuse inpatient/outpatient rehabilitation: Denies     Substance Abuse History: (unchanged information from previous note copied and italicized) - Information that is bolded has been updated       No history of ETOH, illict substance, or tobacco abuse  No past legal actions or arrests secondary to substance intoxication  The patient denies prior DWIs/DUIs  No history of outpatient/inpatient rehabilitation programs  Bryon does not exhibit objective evidence of substance withdrawal during today's examination nor does Bryon appear under the influence of any psychoactive substance           Social History: (unchanged information from previous note copied and italicized) - Information that is bolded has been updated       Developmental: Documented history of milestone/developmental delay  Bryon was born full-term via vaginal birth  No birth complications noted  Denies a history of in-utero exposure to toxins/illicit substances  There a no documented history of IEP and need for special education  Full-scale IQ is unknown at the moment  Adrián Lemons has been actively involved in treatment since the age of 10 secondary to ADHD, ASD, and PDD    Education: high school diploma/GED  Marital history: single  Living arrangement, social support: father and sister (20 years old, named Richard) - mother passed away in 2010 secondary to terminal illness (cancer)  Occupational History: No longer working part-time at [employer]- seeking new employment via [employer]  50  to firearms: Father reports that there are firearms in the hope but denies that [employer] no history of arrests or violence with a deadly weapon          Traumatic History: (unchanged information from previous note copied and italicized) - Information that is bolded has been updated    Edna Alcala is reported  Other Traumatic Events: Denies       Past Medical History:    Past Medical History:   Diagnosis Date   • ADHD (attention deficit hyperactivity disorder)    • Autism spectrum disorder    • GERD (gastroesophageal reflux disease)         Past Surgical History:   Procedure Laterality Date   • NO PAST SURGERIES       No Known Allergies    Substance Abuse History:    Social History     Substance and Sexual Activity   Alcohol Use No     Social History     Substance and Sexual Activity   Drug Use Never       Social History:    Social History     Socioeconomic History   • Marital status: Single     Spouse name: Not on file   • Number of children: Not on file   • Years of education: Not on file   • Highest education level: Not on file   Occupational History   • Not on file   Tobacco Use   • Smoking status: Never   • Smokeless tobacco: Never   Substance and Sexual Activity   • Alcohol use: No   • Drug use: Never   • Sexual activity: Not on file   Other Topics Concern   • Not on file   Social History Narrative   • Not on file     Social Determinants of Health     Financial Resource Strain: Low Risk    • Difficulty of Paying Living Expenses: Not very hard   Food Insecurity: Not on file   Transportation Needs: No Transportation Needs   • Lack of Transportation (Medical): No   • Lack of Transportation (Non-Medical): No   Physical Activity: Not on file   Stress: Not on file   Social Connections: Not on file   Intimate Partner Violence: Not on file   Housing Stability: Not on file       Family Psychiatric History:     Family History   Problem Relation Age of Onset   • Lung cancer Mother    • Diabetes Father    • Alcohol abuse Father         in sustained remission   • ADD / ADHD Sister    • Autism Sister    • Cancer Paternal Grandmother    • No Known Problems Brother    • No Known Problems Sister    • No Known Problems Sister    • No Known Problems Sister        History Review: The following portions of the patient's history were reviewed and updated as appropriate: allergies, current medications, past family history, past medical history, past social history, past surgical history and problem list          OBJECTIVE:     Vital signs in last 24 hours: There were no vitals filed for this visit      Mental Status Evaluation:    Appearance age appropriate, casually dressed, dressed appropriately, looks stated age, bearded   Behavior cooperative, calm, shy   Speech normal rate, normal volume, normal pitch   Mood "fine"   Affect blunted   Thought Processes organized, logical, goal directed   Associations intact associations   Thought Content no overt delusions   Perceptual Disturbances: no auditory hallucinations, no visual hallucinations   Abnormal Thoughts  Risk Potential Suicidal ideation - None at present  Homicidal ideation - None at present  Potential for aggression - No   Orientation oriented to person, place, time/date and situation   Memory recent and remote memory grossly intact   Consciousness alert and awake   Attention Span Concentration Span attention span and concentration appear shorter than expected for age   Intellect appears to be below average intelligence   Insight fair   Judgement fair   Muscle Strength and  Gait normal gait and normal balance   Motor activity no abnormal movements   Language no difficulty naming common objects   Fund of Knowledge adequate knowledge of current events   Pain none   Pain Scale Did not ask patient to formally rate       Laboratory Results: I have personally reviewed all pertinent laboratory/tests results    Recent Labs (last 2 months):   No visits with results within 2 Month(s) from this visit     Latest known visit with results is:   Appointment on 11/19/2021   Component Date Value   • Cholesterol 11/19/2021 123    • Triglycerides 11/19/2021 62    • HDL, Direct 11/19/2021 43    • LDL Calculated 11/19/2021 68    • Non-HDL-Chol (CHOL-HDL) 11/19/2021 80    • Sodium 11/19/2021 139    • Potassium 11/19/2021 4 1    • Chloride 11/19/2021 106    • CO2 11/19/2021 29    • ANION GAP 11/19/2021 4    • BUN 11/19/2021 19    • Creatinine 11/19/2021 0 93    • Glucose, Fasting 11/19/2021 94    • Calcium 11/19/2021 9 1    • AST 11/19/2021 27    • ALT 11/19/2021 40    • Alkaline Phosphatase 11/19/2021 77    • Total Protein 11/19/2021 7 8    • Albumin 11/19/2021 4 3    • Total Bilirubin 11/19/2021 0 78    • eGFR 11/19/2021 116    • Hemoglobin A1C 11/19/2021 5 3    • EAG 11/19/2021 105        Suicide/Homicide Risk Assessment:    The following interventions are recommended: no intervention changes needed      Lethality Statement:    Based on today's assessment and clinical criteria, Surinder Gale contracts for safety and is not an imminent risk of harm to self or others  Outpatient level of care is deemed appropriate at this current time  Amy Cope understands that if they can no longer contract for safety, they need to call the office or report to their nearest Emergency Room for immediate evaluation  Assessment/Plan:     Rohan Rivas a 23 y  o  male with past psychiatric history significant for ASD, ADHD, and PDD who was personally seen and evaluated today at the [address] [place] [address] [address] outpatient clinic for follow-up and medication management  Bryon has been involved in psychiatric/neurologic treatment since the age of 10 and has been seeing Dr Kirill Chanel at [facility] for the last 15 years  Amy Cope has been trialed on numerous psychotropic medications, including Risperdal, Straterra, Klonopin, and Ritalin  He endorses ongoing compliance with current medication regimen that consists of Risperdal 1mg AM, 2mg PM and Straterra 80mg Daily  Father reports that Amy Cope was excessively hyper, impulsive, and dysregulated during periods of psychostimulant use in the past, so these agents should be avoided  Amy Cope is currently in the process of being linked with Neurology as well secondary to PDD and ASD  Amy Cope has a history of IEP/504 plans, ritualistic behaviors, and is often rigid and inflexible in his cognitive struggle  Father reports a pervasive difficulty with social interaction and emotional reciprocity   Amy Cope is currently involved in numerous community-based resources to assist with life skills and to foster greater autonomy and independence  He meets with Micki Deluca from [facility] and Smith Gutierrez () frequently and finds pleasure in this  Father denies recent behavioral outbursts or physical aggression  Marilin Dunaway' sister, who also has ASD, appears to be a major trigger for Marilin Dunaway  However, father denies physical altercations  Bryon denies historical neurovegetative symptomatology suggestive of major depressive disorder or dysthymia  Bryon vehemently denies any acute or chronic history suggestive of an underlying affective (bipolar) organization  Bryon denies historical symptomatology suggestive of an underlying psychotic process  Bryon denies historical symptomatology suggestive of PTSD, OCD, or disordered eating       Today's Plan:     Psychopharmacologically, Asif reports benefit and tolerability associated with current regimen  He denies adverse medication side effects or any current need for medication change or intervention      DSM-V Diagnoses:      1  ) Autism Spectrum Disorder  2 ) ADHD  3 ) PDD by history         Treatment Recommendations/Precautions:        1  ) Autism Spectrum Disorder  - Continue Risperdal 1mg AM, 2mg PM secondary to agitation and irritability associated with ASD  - Continue engagement in weekly socialization with Taz via [employer] - learning life skills, etc    - Psychoeducation provided regarding the importance of exercise and healthy dietary choices and their impact on mood, energy, and motivation     2 ) ADHD  - Continue Straterra 80mg Daily              - Hx of paradoxical excitation with psychostimulant use (Ritalin) - avoid         3 ) PDD by history   - Continue Risperdal 1mg AM, 2mg PM secondary to agitation and irritability associated with ASD       Medication management every 3 months  Does not want to see a therapist despite recommendation  Aware of need to follow up with family physician for medical issues  Aware of 24 hour and weekend coverage for urgent situations accessed by calling [facility] main practice number    Medications Risks/Benefits      Risks, Benefits And Possible Side Effects Of Medications:    Risks, benefits, and possible side effects of medications explained to ALEXIA including risk of parkinsonian symptoms, Tardive Dyskinesia and metabolic syndrome related to treatment with antipsychotic medications  He verbalizes understanding and agreement for treatment  Controlled Medication Discussion:     Not applicable    Psychotherapy Provided:     Individual psychotherapy provided: No     Treatment Plan:    Completed and signed during the session: Yes - with ALEXIA      Visit Time    Visit Start Time: 9:00 AM  Visit Stop Time: 9:27 AM  Total Visit Duration: 27 minutes     The total visit duration detailed above includes: patient engagement, medication management, psychotherapy/counseling, discussion regarding treatment goals, and coordination of care  Note Share Disclaimer:      This note was not shared with the patient due to reasonable likelihood of causing patient harm      Diann Stein MD  Board Certified Diplomate of the American Board of Psychiatry and Neurology  03/07/23

## 2023-03-07 NOTE — BH TREATMENT PLAN
TREATMENT PLAN (Medication Management Only)        [facility]    Name and Date of Birth:  Jenise Kent 24 y o  2000  Date of Treatment Plan: March 7, 2023  Diagnosis/Diagnoses:    1  ADHD (attention deficit hyperactivity disorder), combined type    2  Autism spectrum disorder    3  Pervasive developmental disorder, residual      Strengths/Personal Resources for Self-Care: supportive family, taking medications as prescribed, ability to adapt to life changes, ability to communicate needs, ability to communicate well, ability to listen, ability to reason, good understanding of illness, independence, motivation for treatment, being resoureceful  Area/Areas of need (in own words): mood swings, attention and concentration problems  1  Long Term Goal: maintain control of ADHD symptoms  Target Date:6 months - 9/7/2023  Person/Persons responsible for completion of goal: Thania Oliver  2  Short Term Objective (s) - How will we reach this goal?:   A  Provider new recommended medication/dosage changes and/or continue medication(s): continue current medications as prescribed  B  Attend medication management appointments regularly  C  Take psychiatric medications responsibly  Heather Duong making my home movie  E  Go back to school  F  Be nicer to my sister   Target Date:6 months - 9/7/2023  Person/Persons Responsible for Completion of Goal: Thania Oliver  Progress Towards Goals: continuing treatment  Treatment Modality: medication management every 3 months  Review due 180 days from date of this plan: 6 months - 9/7/2023  Expected length of service: ongoing treatment  My Physician/PA/NP and I have developed this plan together and I agree to work on the goals and objectives  I understand the treatment goals that were developed for my treatment  Treatment Plan completed with assistance and input from patient and verbal consent provided   Treatment plan was not signed at time of office visit secondary to COVID-19 social distancing guidelines

## 2023-03-08 ENCOUNTER — TELEPHONE (OUTPATIENT)
Dept: BEHAVIORAL/MENTAL HEALTH CLINIC | Facility: CLINIC | Age: 23
End: 2023-03-08

## 2023-03-14 DIAGNOSIS — R11.11 NON-INTRACTABLE VOMITING WITHOUT NAUSEA: ICD-10-CM

## 2023-03-14 RX ORDER — OMEPRAZOLE 20 MG/1
20 CAPSULE, DELAYED RELEASE ORAL
Qty: 30 CAPSULE | Refills: 0 | Status: SHIPPED | OUTPATIENT
Start: 2023-03-14

## 2023-03-20 PROBLEM — R05.1 ACUTE COUGH: Status: RESOLVED | Noted: 2023-01-19 | Resolved: 2023-03-20

## 2023-06-19 ENCOUNTER — TELEPHONE (OUTPATIENT)
Dept: PSYCHIATRY | Facility: CLINIC | Age: 23
End: 2023-06-19

## 2023-06-19 NOTE — LETTER
23    Rubio Peraza  : 2000  [address] [address]        Dear Rubio Peraza : We are writing to inform you that your last completed medication management appointment with Alfonso Schilder, MD was on 3/7/2023  Your health and follow-up care are important to us  We want to make you aware that you must contact the office to reschedule an appointment with Alfonso Schilder, MD   Please call our office at [phone] as soon as possible so we can schedule your appointment and prevent an interruption of your care  If you have already scheduled a follow-up appointment, please disregard  If we do not hear from you within 10 business days to make a follow up appointment, we will assume you are no longer interested in care here         Sincerely,      [address] [facility] [address] Support Staff

## 2023-06-19 NOTE — TELEPHONE ENCOUNTER
Writer tried to contact patient to R/S patients appointment with provider on 7/14 @10:30 due to provider not being available at that time  Got a busy signal, writer tried 3 times, got the busy signal each time, and could not leave a message

## 2023-06-20 NOTE — TELEPHONE ENCOUNTER
FOLLOW UP LETTER WILL BE SENT TO: Angie Segura ADDRESS: [address] [facility] [id number]    The number in the patients chart is not a valid working number  The patient needs to contact the office to reschedule 7/14 appointment with provider due to provider not being available at that time

## 2023-07-11 ENCOUNTER — TELEPHONE (OUTPATIENT)
Dept: PSYCHIATRY | Facility: CLINIC | Age: 23
End: 2023-07-11

## 2023-07-16 DIAGNOSIS — R11.11 NON-INTRACTABLE VOMITING WITHOUT NAUSEA: ICD-10-CM

## 2023-07-17 RX ORDER — OMEPRAZOLE 20 MG/1
20 CAPSULE, DELAYED RELEASE ORAL
Qty: 30 CAPSULE | Refills: 0 | Status: SHIPPED | OUTPATIENT
Start: 2023-07-17

## 2023-08-15 DIAGNOSIS — R11.11 NON-INTRACTABLE VOMITING WITHOUT NAUSEA: ICD-10-CM

## 2023-08-16 RX ORDER — OMEPRAZOLE 20 MG/1
20 CAPSULE, DELAYED RELEASE ORAL
Qty: 30 CAPSULE | Refills: 0 | Status: SHIPPED | OUTPATIENT
Start: 2023-08-16

## 2023-09-06 ENCOUNTER — TELEPHONE (OUTPATIENT)
Dept: PSYCHIATRY | Facility: CLINIC | Age: 23
End: 2023-09-06

## 2023-09-06 NOTE — TELEPHONE ENCOUNTER
Writer spoke to father of patient to reschedule 10/24 appointment due to provider schedule change. Patient is now scheduled on 11/13 @2.

## 2023-09-18 DIAGNOSIS — R11.11 NON-INTRACTABLE VOMITING WITHOUT NAUSEA: ICD-10-CM

## 2023-09-19 RX ORDER — OMEPRAZOLE 20 MG/1
20 CAPSULE, DELAYED RELEASE ORAL
Qty: 30 CAPSULE | Refills: 0 | OUTPATIENT
Start: 2023-09-19

## 2023-09-26 ENCOUNTER — OFFICE VISIT (OUTPATIENT)
Dept: INTERNAL MEDICINE CLINIC | Facility: OTHER | Age: 23
End: 2023-09-26
Payer: COMMERCIAL

## 2023-09-26 VITALS
TEMPERATURE: 98.5 F | BODY MASS INDEX: 25.28 KG/M2 | WEIGHT: 180.6 LBS | HEART RATE: 107 BPM | RESPIRATION RATE: 16 BRPM | OXYGEN SATURATION: 96 % | HEIGHT: 71 IN | SYSTOLIC BLOOD PRESSURE: 116 MMHG | DIASTOLIC BLOOD PRESSURE: 68 MMHG

## 2023-09-26 DIAGNOSIS — R11.11 NON-INTRACTABLE VOMITING WITHOUT NAUSEA: ICD-10-CM

## 2023-09-26 DIAGNOSIS — K21.9 GASTROESOPHAGEAL REFLUX DISEASE WITHOUT ESOPHAGITIS: Primary | ICD-10-CM

## 2023-09-26 DIAGNOSIS — Z13.6 ENCOUNTER FOR LIPID SCREENING FOR CARDIOVASCULAR DISEASE: ICD-10-CM

## 2023-09-26 DIAGNOSIS — F41.9 ANXIETY: ICD-10-CM

## 2023-09-26 DIAGNOSIS — F90.2 ADHD (ATTENTION DEFICIT HYPERACTIVITY DISORDER), COMBINED TYPE: ICD-10-CM

## 2023-09-26 DIAGNOSIS — Z13.220 ENCOUNTER FOR LIPID SCREENING FOR CARDIOVASCULAR DISEASE: ICD-10-CM

## 2023-09-26 DIAGNOSIS — F84.0 AUTISM SPECTRUM DISORDER: ICD-10-CM

## 2023-09-26 PROCEDURE — 99204 OFFICE O/P NEW MOD 45 MIN: CPT | Performed by: INTERNAL MEDICINE

## 2023-09-26 RX ORDER — OMEPRAZOLE 20 MG/1
20 CAPSULE, DELAYED RELEASE ORAL
Qty: 90 CAPSULE | Refills: 1 | Status: SHIPPED | OUTPATIENT
Start: 2023-09-26

## 2023-09-26 RX ORDER — RISPERIDONE 1 MG/1
1 TABLET ORAL 3 TIMES DAILY
Qty: 270 TABLET | Refills: 5
Start: 2023-09-26 | End: 2023-10-26

## 2023-09-26 NOTE — PROGRESS NOTES
Assessment/Plan:    ADHD (attention deficit hyperactivity disorder), combined type  Controlled with Strattera 80 mg daily. Continue follow-up with psychiatry. Anxiety  Stable, controlled. Continue follow-up with psychiatry. Autism spectrum disorder  Continue follow-up with psychiatry. Gastroesophageal reflux disease without esophagitis  Continue omeprazole 20 mg daily. Diagnoses and all orders for this visit:    Gastroesophageal reflux disease without esophagitis    Non-intractable vomiting without nausea  -     omeprazole (PriLOSEC) 20 mg delayed release capsule; Take 1 capsule (20 mg total) by mouth daily before breakfast    Anxiety  -     risperiDONE (RisperDAL) 1 mg tablet; Take 1 tablet (1 mg total) by mouth 3 (three) times a day  -     CBC; Future  -     Comprehensive metabolic panel; Future    ADHD (attention deficit hyperactivity disorder), combined type  -     risperiDONE (RisperDAL) 1 mg tablet; Take 1 tablet (1 mg total) by mouth 3 (three) times a day  -     CBC; Future  -     Comprehensive metabolic panel; Future    Encounter for lipid screening for cardiovascular disease  -     Lipid panel; Future    Autism spectrum disorder                  Subjective:      Patient ID: Elizabeth Conner is a 24 y.o. male. Chief Complaint   Patient presents with   • Establish Care   • hm MEDICARE WELLNESS VISIT due after 11/11/23, phq, bmi adult, bmi follow up plan   • Heartburn     With chocholate       24year old male seen today to [facility] care. He has a PMHx of autism, ADHD, anxiety. He has been compliant with his medication regimen. He has no active complaints or concerns at this time. Heartburn  He reports no abdominal pain, no chest pain, no choking, no coughing, no nausea, no sore throat or no wheezing. Pertinent negatives include no fatigue.        The following portions of the patient's history were reviewed and updated as appropriate: allergies, current medications, past family history, past medical history, past social history, past surgical history and problem list.    Review of Systems   Constitutional: Negative for activity change, appetite change, chills, diaphoresis, fatigue and fever. HENT: Negative for congestion, postnasal drip, rhinorrhea, sinus pressure, sinus pain, sneezing and sore throat. Eyes: Negative for visual disturbance. Respiratory: Negative for apnea, cough, choking, chest tightness, shortness of breath and wheezing. Cardiovascular: Negative for chest pain, palpitations and leg swelling. Gastrointestinal: Negative for abdominal distention, abdominal pain, anal bleeding, blood in stool, constipation, diarrhea, nausea and vomiting. Endocrine: Negative for cold intolerance and heat intolerance. Genitourinary: Negative for difficulty urinating, dysuria and hematuria. Musculoskeletal: Negative. Skin: Negative. Neurological: Negative for dizziness, weakness, light-headedness, numbness and headaches. Hematological: Negative for adenopathy. Psychiatric/Behavioral: Negative for agitation, sleep disturbance and suicidal ideas. All other systems reviewed and are negative.         Past Medical History:   Diagnosis Date   • ADHD (attention deficit hyperactivity disorder)    • Autism spectrum disorder    • GERD (gastroesophageal reflux disease)          Current Outpatient Medications:   •  atomoxetine (STRATTERA) 80 MG capsule, Take 1 capsule (80 mg total) by mouth daily, Disp: 90 capsule, Rfl: 5  •  MULTIPLE VITAMINS-MINERALS ER PO, Take by mouth, Disp: , Rfl:   •  omeprazole (PriLOSEC) 20 mg delayed release capsule, Take 1 capsule (20 mg total) by mouth daily before breakfast, Disp: 90 capsule, Rfl: 1  •  risperiDONE (RisperDAL) 1 mg tablet, Take 1 tablet (1 mg total) by mouth 3 (three) times a day, Disp: 270 tablet, Rfl: 5    No Known Allergies    Social History   Past Surgical History:   Procedure Laterality Date   • NO PAST SURGERIES Family History   Problem Relation Age of Onset   • Lung cancer Mother    • Diabetes Father    • Alcohol abuse Father         in sustained remission   • ADD / ADHD Sister    • Autism Sister    • Cancer Paternal Grandmother    • No Known Problems Brother    • No Known Problems Sister    • No Known Problems Sister    • No Known Problems Sister        Objective:  /68 (BP Location: Left arm, Patient Position: Sitting, Cuff Size: Standard)   Pulse (!) 107   Temp 98.5 °F (36.9 °C) (Temporal)   Resp 16   Ht 5' 11" (1.803 m)   Wt 81.9 kg (180 lb 9.6 oz)   SpO2 96%   BMI 25.19 kg/m²     No results found for this or any previous visit (from the past 1344 hour(s)). Physical Exam  Vitals and nursing note reviewed. Constitutional:       General: He is not in acute distress. Appearance: He is well-developed. He is not diaphoretic. HENT:      Head: Normocephalic and atraumatic. Eyes:      General: No scleral icterus. Right eye: No discharge. Left eye: No discharge. Conjunctiva/sclera: Conjunctivae normal.      Pupils: Pupils are equal, round, and reactive to light. Neck:      Thyroid: No thyromegaly. Vascular: No JVD. Cardiovascular:      Rate and Rhythm: Normal rate and regular rhythm. Heart sounds: Normal heart sounds. No murmur heard. No friction rub. No gallop. Pulmonary:      Effort: Pulmonary effort is normal. No respiratory distress. Breath sounds: Normal breath sounds. No wheezing or rales. Chest:      Chest wall: No tenderness. Abdominal:      General: Bowel sounds are normal. There is no distension. Palpations: Abdomen is soft. There is no mass. Tenderness: There is no abdominal tenderness. There is no guarding or rebound. Musculoskeletal:         General: No tenderness or deformity. Normal range of motion. Cervical back: Normal range of motion and neck supple. Lymphadenopathy:      Cervical: No cervical adenopathy. Skin:     General: Skin is warm and dry. Coloration: Skin is not pale. Findings: No erythema or rash. Neurological:      Mental Status: He is alert and oriented to person, place, and time. Cranial Nerves: No cranial nerve deficit. Coordination: Coordination normal.      Deep Tendon Reflexes: Reflexes are normal and symmetric. Psychiatric:         Behavior: Behavior normal.         Thought Content:  Thought content normal.         Judgment: Judgment normal.

## 2023-11-08 ENCOUNTER — RA CDI HCC (OUTPATIENT)
Dept: OTHER | Facility: HOSPITAL | Age: 23
End: 2023-11-08

## 2023-11-08 NOTE — PROGRESS NOTES
[address] [facility] coding opportunities       Chart reviewed, no opportunity found: [address]        Patients Insurance     Medicare Insurance: [facility]

## 2023-11-12 NOTE — PSYCH
MEDICATION MANAGEMENT NOTE        [facility]      Name and Date of Birth:  Bruce Jimenez 23 y.o. 2000 MRN: [id number]    Date of Visit: November 13, 2023    Reason for Visit: Follow-up visit for medication management       SUBJECTIVE:    Bruce Jimenez is a 24 y.o. male with past psychiatric history significant for ASD, ADHD, and ID (full-scale IQ 80), who was personally seen and evaluated today at the [address] [place] [address] outpatient clinic for follow-up and medication management. Mireya Garner presents alongside his father who assists with history gathering. Kane Kay is pleasant is cooperative. He is limited intellectually and concrete at baseline. He completes assessment without significant difficulty. Mireya Garner endorses compliance with psychotropic medication regimen consisting of Risperdal and Straterra. He denies adverse medication side effects. Kane Kay completed neuropsych testing on 10/25/23 via [facility]. The results were discussed today and scanned into media. Full-scale IQ is 80. We also spoke at length today regarding Asif's positivity journal that he has been working on for over 1 year. We processed his feelings regarding social situations. He was highly encouraged to focus on his emotionality in future journal entries. At the moment, Kane Kay denies new onset depression or anxiety. He shares that both his sleep and appetite are "good". He and his father have been working on making better dietary choices and thus, Kane Kay has not consumed chocolate for 1 month. He was applauded for his efforts. Father voices frustration that he has been eating cookies for breakfast. He was highly encouraged to consider nutritional supplementation (Premier Protein Peanut Butter as per Asif's request) as a healthier meal replacement option. Additionally, Kane Kay was receptive to updated metabolic testing. Orders placed today. Kane Kay denies SI/HI when asked today.  He is without crying spells or anhedonia. 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 He is considering returning to school ([employer]) and/or obtaining employment. Katherine Mancia reports adequate control of ADHD with Straterra. He denies recent deficits in focus/concentration. Katherine Mancia denies recent panic symptomatology today or overwhelming anxiety. He has limited his exposure to political news via the media as this was previously problematic. Katherine Mancia is not tense or on-edge today. He denies recent behavioral dysregulation or outbursts. His mood has been well managed. During today's examination, Bertram Barrett does not exhibit objective evidence of cassie/hypomania or psychosis. Bertram Barrett is not currently agitated, grandiose, impulsive, or pathologically labile. Bertram Barrett is without perceptual disturbances, such as A/V hallucinations, paranoia, ideas of reference, or delusional beliefs. Bertram Barrett denies recent ETOH or illicit substance abuse. Bertram Barrett offers no further concerns. Current Rating Scores:     None completed today. Review Of Systems:      Constitutional negative   ENT negative   Cardiovascular negative   Respiratory negative   Gastrointestinal abdominal discomfort and nausea   Genitourinary negative   Musculoskeletal negative   Integumentary negative   Neurological negative   Endocrine negative   Other Symptoms none, all other systems are negative       Past Psychiatric History: (unchanged information from previous note copied and italicized) - Information that is bolded has been updated.       Inpatient psychiatric admissions: Denies  Prior outpatient psychiatric linkage: Previously linked with Dr. Amy Burkett via [facility]  Past/current psychotherapy: Denies - Is actively involved in [facility] with assistant Rika Newton who assists with life skills and counseling  History of suicidal attempts/gestures: Denies  History of violence/aggressive behaviors: No overt physical aggression but history of irritability and agitation with sister   Psychotropic medication trials: Klonopin, Ritalin (too stimulated, hyperactive), Risperdal, Strattera   Substance abuse inpatient/outpatient rehabilitation: Denies     Substance Abuse History: (unchanged information from previous note copied and italicized) - Information that is bolded has been updated. No history of ETOH, illict substance, or tobacco abuse. No past legal actions or arrests secondary to substance intoxication. The patient denies prior DWIs/DUIs. No history of outpatient/inpatient rehabilitation programs. Bernarda Shukla does not exhibit objective evidence of substance withdrawal during today's examination nor does Bernarda Shukla appear under the influence of any psychoactive substance. Social History: (unchanged information from previous note copied and italicized) - Information that is bolded has been updated. Developmental: Documented history of milestone/developmental delay. Bernarda Shukla was born full-term via vaginal birth. No birth complications noted. Denies a history of in-utero exposure to toxins/illicit substances. There a no documented history of IEP and need for special education. Full-scale IQ is unknown at the moment. Bernarda Shukla has been actively involved in treatment since the age of 10 secondary to ADHD, ASD, and PDD. Education: high school diploma/GED  Marital history: single  Living arrangement, social support: father and sister (25years old, named Richard) - mother passed away in 2010 secondary to terminal illness (cancer)  Occupational History: No longer working part-time at [employer] - seeking new employment via [employer] to firearms: Father reports that there are firearms in the hope but denies that Bernarda Shukla has direct access to these weapons/firearms. Francis Upton has no history of arrests or violence with a deadly weapon.          Traumatic History: (unchanged information from previous note copied and italicized) - Information that is bolded has been updated. Abuse:none is reported  Other Traumatic Events: Denies    Past Medical History:    Past Medical History:   Diagnosis Date    ADHD (attention deficit hyperactivity disorder)     Autism spectrum disorder     GERD (gastroesophageal reflux disease)         Past Surgical History:   Procedure Laterality Date    NO PAST SURGERIES       No Known Allergies    Substance Abuse History:    Social History     Substance and Sexual Activity   Alcohol Use No     Social History     Substance and Sexual Activity   Drug Use Never       Social History:    Social History     Socioeconomic History    Marital status: Single     Spouse name: Not on file    Number of children: Not on file    Years of education: Not on file    Highest education level: Not on file   Occupational History    Not on file   Tobacco Use    Smoking status: Never    Smokeless tobacco: Never   Vaping Use    Vaping Use: Never used   Substance and Sexual Activity    Alcohol use: No    Drug use: Never    Sexual activity: Not on file   Other Topics Concern    Not on file   Social History Narrative    Not on file     Social Determinants of Health     Financial Resource Strain: Low Risk  (11/4/2022)    Overall Financial Resource Strain (CARDIA)     Difficulty of Paying Living Expenses: Not very hard   Food Insecurity: Not on file   Transportation Needs: No Transportation Needs (11/4/2022)    PRAPARE - Transportation     Lack of Transportation (Medical): No     Lack of Transportation (Non-Medical):  No   Physical Activity: Not on file   Stress: Not on file   Social Connections: Not on file   Intimate Partner Violence: Not on file   Housing Stability: Not on file       Family Psychiatric History:     Family History   Problem Relation Age of Onset    Lung cancer Mother     Diabetes Father     Alcohol abuse Father         in sustained remission    ADD / ADHD Sister     Autism Sister     Cancer Paternal Grandmother     No Known Problems Brother     No Known Problems Sister     No Known Problems Sister     No Known Problems Sister        History Review: The following portions of the patient's history were reviewed and updated as appropriate: allergies, current medications, past family history, past medical history, past social history, past surgical history, and problem list.         OBJECTIVE:     Vital signs in last 24 hours: There were no vitals filed for this visit. Mental Status Evaluation:    Appearance age appropriate, casually dressed, dressed appropriately, looks stated age   Behavior pleasant, cooperative, calm   Speech normal rate, normal volume, normal pitch   Mood "Good"   Affect blunted   Thought Processes concrete   Associations concrete associations   Thought Content no overt delusions   Perceptual Disturbances: no auditory hallucinations, no visual hallucinations   Abnormal Thoughts  Risk Potential Suicidal ideation - None at present  Homicidal ideation - None at present  Potential for aggression - No   Orientation oriented to person, place, time/date, and situation   Memory recent and remote memory grossly intact   Consciousness alert and awake   Attention Span Concentration Span attention span and concentration appear shorter than expected for age   Intellect appears to be below average intelligence   Insight limited   Judgement fair   Muscle Strength and  Gait normal gait and normal balance   Motor activity no abnormal movements   Language no difficulty naming common objects   Fund of Knowledge adequate knowledge of current events   Pain none   Pain Scale Did not ask patient to formally rate       Laboratory Results: I have personally reviewed all pertinent laboratory/tests results    Recent Labs (last 2 months):   No visits with results within 2 Month(s) from this visit.    Latest known visit with results is:   Appointment on 11/19/2021   Component Date Value    Cholesterol 11/19/2021 123     Triglycerides 11/19/2021 62     HDL, Direct 11/19/2021 43     LDL Calculated 11/19/2021 68     Non-HDL-Chol (CHOL-HDL) 11/19/2021 80     Sodium 11/19/2021 139     Potassium 11/19/2021 4.1     Chloride 11/19/2021 106     CO2 11/19/2021 29     ANION GAP 11/19/2021 4     BUN 11/19/2021 19     Creatinine 11/19/2021 0.93     Glucose, Fasting 11/19/2021 94     Calcium 11/19/2021 9.1     AST 11/19/2021 27     ALT 11/19/2021 40     Alkaline Phosphatase 11/19/2021 77     Total Protein 11/19/2021 7.8     Albumin 11/19/2021 4.3     Total Bilirubin 11/19/2021 0.78     eGFR 11/19/2021 116     Hemoglobin A1C 11/19/2021 5.3     EAG 11/19/2021 105        Suicide/Homicide Risk Assessment:    The following interventions are recommended: contracts for safety at present - agrees to go to ED if feeling unsafe      Lethality Statement:    Based on today's assessment and clinical criteria, Manuela Archuleta contracts for safety and is not an imminent risk of harm to self or others. Outpatient level of care is deemed appropriate at this current time. Preston Diehl understands that if they can no longer contract for safety, they need to call the office or report to their nearest Emergency Room for immediate evaluation. Assessment/Plan:     Manuela Arcuhleta is a 24 y.o. male with past psychiatric history significant for ASD, ADHD, and ID (full-scale IQ 80 - see neuropsych testing completed 10/25/23 via media) who was personally seen and evaluated today at the [address] [place] [address] outpatient clinic for follow-up and medication management. Preston Diehl has been involved in psychiatric/neurologic treatment since the age of 10 and has been seeing Dr. Saman Taylor at [facility] for the last 15 years. Preston Diehl has been trialed on numerous psychotropic medications, including Risperdal, Straterra, Klonopin, and Ritalin. He endorses ongoing compliance with current medication regimen that consists of Risperdal 1mg AM, 2mg PM and Straterra 80mg Daily.  Father reports that Preston Diehl was excessively hyper, impulsive, and dysregulated during periods of psychostimulant use in the past, so these agents should be avoided. Chloe Gutiérrez is currently in the process of being linked with Neurology as well secondary to PDD and ASD. Chloe Gutiérrez has a history of IEP/504 plans, ritualistic behaviors, and is often rigid and inflexible in his cognitive struggle. Father reports a pervasive difficulty with social interaction and emotional reciprocity. Chloe Gutiérrez is currently involved in numerous community-based resources to assist with life skills and to foster greater autonomy and independence. He meets with Marinocentee Shruthi from [facility] and Ilir Butterfield () frequently and finds pleasure in this. Father denies recent behavioral outbursts or physical aggression. Chloe Gutiérrez' sister, who also has ASD, appears to be a major trigger for Chloe Gutiérrez. However, father denies physical altercations. Chloe Gutiérrez denies historical neurovegetative symptomatology suggestive of major depressive disorder or dysthymia. Chloe Gutiérrez vehemently denies any acute or chronic history suggestive of an underlying affective (bipolar) organization. Chloe Gutiérrez denies historical symptomatology suggestive of an underlying psychotic process. Chloe Gutiérrez denies historical symptomatology suggestive of PTSD, OCD, or disordered eating. Today's Plan:     Psychopharmacologically, Pradeep Moctezuma endorses tolerability and benefit from current regimen. He denies need for medication change or intervention.       DSM-V Diagnoses:      1.) Autism Spectrum Disorder  2.) ADHD  3.) ID        Treatment Recommendations/Precautions:        1.) Autism Spectrum Disorder  - Continue Risperdal 1mg AM, 2mg PM secondary to agitation and irritability associated with ASD  - Continue engagement in weekly socialization with Taz via [facility] - learning life skills, etc.   - Psychoeducation provided regarding the importance of exercise and healthy dietary choices and their impact on mood, energy, and motivation     2.) ADHD  - Continue Straterra 80mg Daily              - Hx of paradoxical excitation with psychostimulant use (Ritalin) - avoid         3.) ID  - Full scale IQ 88  - Completed updated neuropsych testing 10/25/23 - scanned into media   - Continue Risperdal 1mg AM, 2mg PM secondary to agitation and irritability associated with ASD      Medication management every 3 months  Aware of need to follow up with family physician for medical issues  Aware of 24 hour and weekend coverage for urgent situations accessed by calling [facility] main practice number    Medications Risks/Benefits      Risks, Benefits And Possible Side Effects Of Medications:    Risks, benefits, and possible side effects of medications explained to [place] including risk of parkinsonian symptoms, Tardive Dyskinesia and metabolic syndrome related to treatment with antipsychotic medications. He verbalizes understanding and agreement for treatment. Controlled Medication Discussion:     Not applicable    Psychotherapy Provided:     Individual psychotherapy provided: No     Treatment Plan:    Completed and signed during the session: Yes - with [facility]      Visit Time    Visit Start Time: 2:00 PM   Visit Stop Time: 2:30 PM  Total Visit Duration:  30 minutes     The total visit duration detailed above includes: patient engagement, medication management, psychotherapy/counseling, discussion regarding treatment goals, documentation, review of past medical records, and coordination of care. Note Share Disclaimer:      This note was not shared with the patient due to reasonable likelihood of causing patient harm      Morales Fitch MD  Board Certified Diplomate of the [address] [place] [address] of Psychiatry and Neurology  11/13/23

## 2023-11-13 ENCOUNTER — OFFICE VISIT (OUTPATIENT)
Dept: PSYCHIATRY | Facility: CLINIC | Age: 23
End: 2023-11-13
Payer: COMMERCIAL

## 2023-11-13 DIAGNOSIS — F90.2 ADHD (ATTENTION DEFICIT HYPERACTIVITY DISORDER), COMBINED TYPE: ICD-10-CM

## 2023-11-13 DIAGNOSIS — F79 INTELLECTUAL DISABILITY: ICD-10-CM

## 2023-11-13 DIAGNOSIS — F84.0 AUTISM SPECTRUM DISORDER: Primary | ICD-10-CM

## 2023-11-13 DIAGNOSIS — F84.9 PERVASIVE DEVELOPMENTAL DISORDER, RESIDUAL: ICD-10-CM

## 2023-11-13 DIAGNOSIS — Z79.899 LONG TERM CURRENT USE OF ANTIPSYCHOTIC MEDICATION: ICD-10-CM

## 2023-11-13 PROCEDURE — 99214 OFFICE O/P EST MOD 30 MIN: CPT | Performed by: STUDENT IN AN ORGANIZED HEALTH CARE EDUCATION/TRAINING PROGRAM

## 2023-11-13 RX ORDER — RISPERIDONE 1 MG/1
1 TABLET ORAL 3 TIMES DAILY
Qty: 270 TABLET | Refills: 5 | Status: SHIPPED | OUTPATIENT
Start: 2023-11-13 | End: 2025-05-06

## 2023-11-13 RX ORDER — ATOMOXETINE 80 MG/1
80 CAPSULE ORAL DAILY
Qty: 90 CAPSULE | Refills: 5 | Status: SHIPPED | OUTPATIENT
Start: 2023-11-13

## 2023-11-13 NOTE — BH TREATMENT PLAN
TREATMENT PLAN (Medication Management Only)        [address] [facility] [place]    Name and Date of Birth:  Abundio Lindquist 23 y.o. 2000  Date of Treatment Plan: November 13, 2023  Diagnosis/Diagnoses:    1. Autism spectrum disorder    2. ADHD (attention deficit hyperactivity disorder), combined type    3. Pervasive developmental disorder, residual    4. Anxiety    5. Long term current use of antipsychotic medication      Strengths/Personal Resources for Self-Care: supportive family, supportive friends, taking medications as prescribed, ability to adapt to life changes, ability to communicate needs, ability to communicate well, ability to listen, good understanding of illness, independence, motivation for treatment, ability to negotiate basic needs. Area/Areas of need (in own words): mood instability  1. Long Term Goal: maintain control of mood. Target Date:6 months - 5/13/2024  Person/Persons responsible for completion of goal: Bertram Barrett  2. Short Term Objective (s) - How will we reach this goal?:   A. Provider new recommended medication/dosage changes and/or continue medication(s): continue current medications as prescribed. B. Attend medication management appointments regularly. C. Take psychiatric medications responsibly. D. Return to school or get a job  E. Make healthier breakfast choices   Target Date:6 months - 5/13/2024  Person/Persons Responsible for Completion of Goal: Bertram Barrett  Progress Towards Goals: continuing treatment  Treatment Modality: medication management every 3 months  Review due 180 days from date of this plan: 6 months - 5/13/2024  Expected length of service: ongoing treatment  My Physician/PA/NP and I have developed this plan together and I agree to work on the goals and objectives. I understand the treatment goals that were developed for my treatment. Treatment Plan was completed with Bryon's assistance and input throughout today's session. Preston Diehl consented to the information provided and documented above. Unfortunately, treatment plan was not signed at the time of this office visit secondary to issues related to signature keypad.

## 2023-11-21 ENCOUNTER — OFFICE VISIT (OUTPATIENT)
Dept: INTERNAL MEDICINE CLINIC | Facility: OTHER | Age: 23
End: 2023-11-21
Payer: COMMERCIAL

## 2023-11-21 VITALS
HEART RATE: 96 BPM | WEIGHT: 180.8 LBS | BODY MASS INDEX: 25.31 KG/M2 | DIASTOLIC BLOOD PRESSURE: 74 MMHG | RESPIRATION RATE: 18 BRPM | HEIGHT: 71 IN | OXYGEN SATURATION: 98 % | SYSTOLIC BLOOD PRESSURE: 120 MMHG | TEMPERATURE: 97.6 F

## 2023-11-21 DIAGNOSIS — F79 INTELLECTUAL DISABILITY: ICD-10-CM

## 2023-11-21 DIAGNOSIS — Z23 ENCOUNTER FOR IMMUNIZATION: ICD-10-CM

## 2023-11-21 DIAGNOSIS — Z23 NEED FOR INFLUENZA VACCINATION: ICD-10-CM

## 2023-11-21 DIAGNOSIS — F41.9 ANXIETY: ICD-10-CM

## 2023-11-21 DIAGNOSIS — K21.9 GASTROESOPHAGEAL REFLUX DISEASE WITHOUT ESOPHAGITIS: Primary | ICD-10-CM

## 2023-11-21 DIAGNOSIS — Z13.6 SCREENING FOR CARDIOVASCULAR CONDITION: ICD-10-CM

## 2023-11-21 DIAGNOSIS — Z13.1 SCREENING FOR DIABETES MELLITUS: ICD-10-CM

## 2023-11-21 DIAGNOSIS — Z00.00 MEDICARE ANNUAL WELLNESS VISIT, SUBSEQUENT: ICD-10-CM

## 2023-11-21 DIAGNOSIS — F90.2 ADHD (ATTENTION DEFICIT HYPERACTIVITY DISORDER), COMBINED TYPE: ICD-10-CM

## 2023-11-21 DIAGNOSIS — F84.0 AUTISM SPECTRUM DISORDER: ICD-10-CM

## 2023-11-21 PROBLEM — R07.89 OTHER CHEST PAIN: Status: RESOLVED | Noted: 2023-01-13 | Resolved: 2023-11-21

## 2023-11-21 PROCEDURE — 90686 IIV4 VACC NO PRSV 0.5 ML IM: CPT

## 2023-11-21 PROCEDURE — G0439 PPPS, SUBSEQ VISIT: HCPCS | Performed by: INTERNAL MEDICINE

## 2023-11-21 PROCEDURE — 99214 OFFICE O/P EST MOD 30 MIN: CPT | Performed by: INTERNAL MEDICINE

## 2023-11-21 PROCEDURE — G0008 ADMIN INFLUENZA VIRUS VAC: HCPCS

## 2023-11-21 PROCEDURE — G0444 DEPRESSION SCREEN ANNUAL: HCPCS | Performed by: INTERNAL MEDICINE

## 2023-11-21 NOTE — PROGRESS NOTES
Assessment and Plan:     Problem List Items Addressed This Visit        Digestive    Gastroesophageal reflux disease without esophagitis - Primary     Controlled. Continue omeprazole 20 mg daily. Other    ADHD (attention deficit hyperactivity disorder), combined type     controlled with Strattera. Continue follow-up with psychiatry. Anxiety     Controlled with Risperdal 1 mg 3 times a day. Autism spectrum disorder    Intellectual disability     Continue supportive care. Other Visit Diagnoses     Medicare annual wellness visit, subsequent        Screening for diabetes mellitus        Screening for cardiovascular condition        Encounter for immunization        Relevant Orders    influenza vaccine, quadrivalent, 0.5 mL, preservative-free, for adult and pediatric patients 6 mos+ (AFLURIA, FLUARIX, FLULAVAL, FLUZONE) (Completed)    Need for influenza vaccination        Relevant Orders    influenza vaccine, quadrivalent, 0.5 mL, preservative-free, for adult and pediatric patients 6 mos+ (AFLURIA, FLUARIX, FLULAVAL, FLUZONE) (Completed)        BMI Counseling: Body mass index is 25.22 kg/m². The BMI is above normal. Nutrition recommendations include encouraging healthy choices of fruits and vegetables, decreasing fast food intake, consuming healthier snacks, limiting drinks that contain sugar, moderation in carbohydrate intake, increasing intake of lean protein, reducing intake of saturated and trans fat and reducing intake of cholesterol. Exercise recommendations include moderate physical activity 150 minutes/week and exercising 3-5 times per week. No pharmacotherapy was ordered. Patient referred to PCP. Rationale for BMI follow-up plan is due to patient being overweight or obese. Depression Screening and Follow-up Plan: Patient was screened for depression during today's encounter. They screened negative with a PHQ-2 score of 0.       Preventive health issues were discussed with patient, and age appropriate screening tests were ordered as noted in patient's After Visit Summary. Personalized health advice and appropriate referrals for health education or preventive services given if needed, as noted in patient's After Visit Summary. History of Present Illness:     Patient presents for a Medicare Wellness Visit    65-year-old male is seen today for follow-up of chronic conditions. Recent laboratory studies reviewed today. He has been compliant with his medication regimen. He has no active complaints or concerns at this time. He follows up with a psychiatrist regularly. Anxiety  Presents for follow-up visit. Patient reports no chest pain, dizziness, nausea, palpitations, shortness of breath or suicidal ideas. Symptoms occur rarely. The severity of symptoms is mild. The quality of sleep is good. Nighttime awakenings: none. Compliance with medications is %. Heartburn  He reports no abdominal pain, no chest pain, no choking, no coughing, no nausea, no sore throat or no wheezing. This is a chronic problem. The current episode started more than 1 year ago. The problem occurs rarely. The problem has been unchanged. The symptoms are aggravated by certain foods. Pertinent negatives include no fatigue. He has tried a PPI for the symptoms. The treatment provided moderate relief. Patient Care Team:  Kylee Black MD as PCP - General (Internal Medicine)     Review of Systems:     Review of Systems   Constitutional:  Negative for activity change, appetite change, chills, diaphoresis, fatigue and fever. HENT:  Negative for congestion, postnasal drip, rhinorrhea, sinus pressure, sinus pain, sneezing and sore throat. Eyes:  Negative for visual disturbance. Respiratory:  Negative for apnea, cough, choking, chest tightness, shortness of breath and wheezing. Cardiovascular:  Negative for chest pain, palpitations and leg swelling.    Gastrointestinal:  Negative for abdominal distention, abdominal pain, anal bleeding, blood in stool, constipation, diarrhea, nausea and vomiting. Endocrine: Negative for cold intolerance and heat intolerance. Genitourinary:  Negative for difficulty urinating, dysuria and hematuria. Musculoskeletal: Negative. Skin: Negative. Neurological:  Negative for dizziness, weakness, light-headedness, numbness and headaches. Hematological:  Negative for adenopathy. Psychiatric/Behavioral:  Negative for agitation, sleep disturbance and suicidal ideas. All other systems reviewed and are negative.        Problem List:     Patient Active Problem List   Diagnosis   • ADHD (attention deficit hyperactivity disorder), combined type   • Anxiety   • Autism spectrum disorder   • Pervasive developmental disorder, residual   • Gastroesophageal reflux disease without esophagitis   • Intellectual disability      Past Medical and Surgical History:     Past Medical History:   Diagnosis Date   • ADHD (attention deficit hyperactivity disorder)    • Autism spectrum disorder    • GERD (gastroesophageal reflux disease)      Past Surgical History:   Procedure Laterality Date   • NO PAST SURGERIES        Family History:     Family History   Problem Relation Age of Onset   • Lung cancer Mother    • Diabetes Father    • Alcohol abuse Father         in sustained remission   • ADD / ADHD Sister    • Autism Sister    • Cancer Paternal Grandmother    • No Known Problems Brother    • No Known Problems Sister    • No Known Problems Sister    • No Known Problems Sister       Social History:     Social History     Socioeconomic History   • Marital status: Single     Spouse name: None   • Number of children: None   • Years of education: None   • Highest education level: None   Occupational History   • None   Tobacco Use   • Smoking status: Never   • Smokeless tobacco: Never   Vaping Use   • Vaping Use: Never used   Substance and Sexual Activity   • Alcohol use: No   • Drug use: Never • Sexual activity: None   Other Topics Concern   • None   Social History Narrative   • None     Social Determinants of Health     Financial Resource Strain: Low Risk  (11/14/2023)    Overall Financial Resource Strain (CARDIA)    • Difficulty of Paying Living Expenses: Not very hard   Food Insecurity: Not on file   Transportation Needs: No Transportation Needs (11/14/2023)    PRAPARE - Transportation    • Lack of Transportation (Medical): No    • Lack of Transportation (Non-Medical): No   Physical Activity: Not on file   Stress: Not on file   Social Connections: Not on file   Intimate Partner Violence: Not on file   Housing Stability: Not on file      Medications and Allergies:     Current Outpatient Medications   Medication Sig Dispense Refill   • atomoxetine (STRATTERA) 80 MG capsule Take 1 capsule (80 mg total) by mouth daily 90 capsule 5   • MULTIPLE VITAMINS-MINERALS ER PO Take by mouth     • omeprazole (PriLOSEC) 20 mg delayed release capsule Take 1 capsule (20 mg total) by mouth daily before breakfast 90 capsule 1   • risperiDONE (RisperDAL) 1 mg tablet Take 1 tablet (1 mg total) by mouth 3 (three) times a day 270 tablet 5     No current facility-administered medications for this visit.      No Known Allergies   Immunizations:     Immunization History   Administered Date(s) Administered   • COVID-19 PFIZER VACCINE 0.3 ML IM 04/02/2021, 04/24/2021   • DTP [birth year], 09/15/2003   • HPV Quadrivalent 01/29/2016   • Hep B, adult 2000, 2000, 2000   • Hib (PRP-OMP) 2000, 2000, 2000, 02/08/2000   • INFLUENZA 11/11/2022   • IPV 2000, 2000, 1999, 11/03/2004   • Influenza Quadrivalent, 6-35 Months IM 11/01/2017   • Influenza, injectable, quadrivalent, preservative free 0.5 mL 11/13/2020, 11/11/2022, 11/21/2023   • Influenza, recombinant, quadrivalent,injectable, preservative free 09/17/2018, 10/16/2019, 11/10/2021   • Influenza, seasonal, injectable 11/20/2013, 10/20/2014, 01/05/2016, 10/21/2016   • MMR 09/15/2003, 12/13/2004   • Meningococcal, Unknown Serogroups 01/04/2011, 02/04/2015   • Tdap 01/04/2011, 03/31/2014   • Varicella 02/08/2000, 01/12/2007      Health Maintenance:         Topic Date Due   • HIV Screening  11/21/2025 (Originally 1/3/2014)   • Hepatitis C Screening  12/21/2025 (Originally 2000)         Topic Date Due   • HPV Vaccine (2 - Male 3-dose series) 02/26/2016   • COVID-19 Vaccine (3 - Pfizer series) 06/19/2021      Medicare Screening Tests and Risk Assessments:     Simi John is here for his Subsequent Wellness visit. Last Medicare Wellness visit information reviewed, patient interviewed and updates made to the record today. Health Risk Assessment:   Patient rates overall health as excellent. Patient feels that their physical health rating is much better. Patient is very satisfied with their life. Eyesight was rated as same. Hearing was rated as same. Patient feels that their emotional and mental health rating is same. Patients states they are never, rarely angry. Patient states they are never, rarely unusually tired/fatigued. Pain experienced in the last 7 days has been none. Patient states that he has experienced no weight loss or gain in last 6 months. Depression Screening:   PHQ-2 Score: 0      Fall Risk Screening: In the past year, patient has experienced: no history of falling in past year      Home Safety:  Patient does not have trouble with stairs inside or outside of their home. Patient has working smoke alarms and has working carbon monoxide detector. Home safety hazards include: none. Nutrition:   Current diet is Regular. Medications:   Patient is currently taking over-the-counter supplements. OTC medications include: see medication list. Patient is able to manage medications.      Activities of Daily Living (ADLs)/Instrumental Activities of Daily Living (IADLs):   Walk and transfer into and out of bed and chair?: Yes  Dress and groom yourself?: Yes    Bathe or shower yourself?: Yes    Feed yourself? Yes  Do your laundry/housekeeping?: Yes  Manage your money, pay your bills and track your expenses?: Yes  Make your own meals?: Yes    Do your own shopping?: Yes    Previous Hospitalizations:   Any hospitalizations or ED visits within the last 12 months?: No      Advance Care Planning:   Living will: No    Durable POA for healthcare: No    Advanced directive: No    Advanced directive counseling given: Yes      Cognitive Screening:   Provider or family/friend/caregiver concerned regarding cognition?: No    PREVENTIVE SCREENINGS      Cardiovascular Screening:    General: Screening Current and Risks and Benefits Discussed    Due for: Lipid Panel      Diabetes Screening:     General: Risks and Benefits Discussed    Due for: Blood Glucose      Colorectal Cancer Screening:     General: Screening Not Indicated      Prostate Cancer Screening:    General: Screening Not Indicated      Osteoporosis Screening:    General: Screening Not Indicated      Abdominal Aortic Aneurysm (AAA) Screening:        General: Screening Not Indicated      Lung Cancer Screening:     General: Screening Not Indicated      Hepatitis C Screening:    General: Screening Not Indicated    Screening, Brief Intervention, and Referral to Treatment (SBIRT)    Screening  Typical number of drinks in a day: 0  Typical number of drinks in a week: 0  Interpretation: Low risk drinking behavior.     AUDIT-C Screenin) How often did you have a drink containing alcohol in the past year? never  2) How many drinks did you have on a typical day when you were drinking in the past year? 0  3) How often did you have 6 or more drinks on one occasion in the past year? never    AUDIT-C Score: 0  Interpretation: Score 0-3 (male): Negative screen for alcohol misuse    Single Item Drug Screening:  How often have you used an illegal drug (including marijuana) or a prescription medication for non-medical reasons in the past year? never    Single Item Drug Screen Score: 0  Interpretation: Negative screen for possible drug use disorder    Brief Intervention  Alcohol & drug use screenings were reviewed. No concerns regarding substance use disorder identified. Annual Depression Screening  Time spent screening and evaluating the patient for depression during today's encounter was 5 minutes. Other Counseling Topics:   Car/seat belt/driving safety, skin self-exam, sunscreen and calcium and vitamin D intake and regular weightbearing exercise. No results found. Physical Exam:     /74 (BP Location: Left arm, Patient Position: Sitting, Cuff Size: Standard)   Pulse 96   Temp 97.6 °F (36.4 °C) (Temporal)   Resp 18   Ht 5' 11" (1.803 m)   Wt 82 kg (180 lb 12.8 oz)   SpO2 98%   BMI 25.22 kg/m²     Physical Exam  Vitals and nursing note reviewed. Constitutional:       General: He is not in acute distress. Appearance: Normal appearance. He is normal weight. He is not ill-appearing, toxic-appearing or diaphoretic. HENT:      Head: Normocephalic and atraumatic. Right Ear: Tympanic membrane, ear canal and external ear normal. There is no impacted cerumen. Left Ear: Tympanic membrane, ear canal and external ear normal. There is no impacted cerumen. Nose: Nose normal. No congestion or rhinorrhea. Mouth/Throat:      Mouth: Mucous membranes are moist.      Pharynx: Oropharynx is clear. No oropharyngeal exudate or posterior oropharyngeal erythema. Eyes:      General: No scleral icterus. Right eye: No discharge. Left eye: No discharge. Extraocular Movements: Extraocular movements intact. Conjunctiva/sclera: Conjunctivae normal.      Pupils: Pupils are equal, round, and reactive to light. Neck:      Vascular: No carotid bruit. Cardiovascular:      Rate and Rhythm: Normal rate and regular rhythm.       Pulses: Normal pulses. Heart sounds: Normal heart sounds. No murmur heard. No friction rub. No gallop. Pulmonary:      Effort: Pulmonary effort is normal. No respiratory distress. Breath sounds: Normal breath sounds. No wheezing or rales. Abdominal:      General: Abdomen is flat. Bowel sounds are normal. There is no distension. Palpations: Abdomen is soft. There is no mass. Tenderness: There is no abdominal tenderness. There is no guarding. Hernia: No hernia is present. Musculoskeletal:         General: No swelling, tenderness, deformity or signs of injury. Normal range of motion. Cervical back: Normal range of motion and neck supple. No rigidity. No muscular tenderness. Right lower leg: No edema. Left lower leg: No edema. Lymphadenopathy:      Cervical: No cervical adenopathy. Skin:     General: Skin is warm and dry. Capillary Refill: Capillary refill takes less than 2 seconds. Coloration: Skin is not jaundiced or pale. Findings: No bruising, erythema, lesion or rash. Neurological:      General: No focal deficit present. Mental Status: He is alert and oriented to person, place, and time. Mental status is at baseline. Cranial Nerves: No cranial nerve deficit. Sensory: No sensory deficit. Motor: No weakness. Coordination: Coordination normal.      Gait: Gait normal.      Deep Tendon Reflexes: Reflexes normal.   Psychiatric:         Mood and Affect: Mood normal.         Behavior: Behavior normal.         Thought Content:  Thought content normal.         Judgment: Judgment normal.          Vee Rudd MD

## 2023-11-21 NOTE — PATIENT INSTRUCTIONS
Medicare Preventive Visit Patient Instructions  Thank you for completing your Welcome to Medicare Visit or Medicare Annual Wellness Visit today. Your next wellness visit will be due in one year (11/21/2024). The screening/preventive services that you may require over the next 5-10 years are detailed below. Some tests may not apply to you based off risk factors and/or age. Screening tests ordered at today's visit but not completed yet may show as past due. Also, please note that scanned in results may not display below. Preventive Screenings:  Service Recommendations Previous Testing/Comments   Colorectal Cancer Screening  Colonoscopy    Fecal Occult Blood Test (FOBT)/Fecal Immunochemical Test (FIT)  Fecal DNA/Cologuard Test  Flexible Sigmoidoscopy Age: 43-73 years old   Colonoscopy: every 10 years (May be performed more frequently if at higher risk)  OR  FOBT/FIT: every 1 year  OR  Cologuard: every 3 years  OR  Sigmoidoscopy: every 5 years  Screening may be recommended earlier than age 39 if at higher risk for colorectal cancer. Also, an individualized decision between you and your healthcare provider will decide whether screening between the ages of 77-80 would be appropriate.  Colonoscopy: Not on file  FOBT/FIT: Not on file  Cologuard: Not on file  Sigmoidoscopy: Not on file          Prostate Cancer Screening Individualized decision between patient and health care provider in men between ages of 53-66   Medicare will cover every 12 months beginning on the day after your 50th birthday PSA: No results in last 5 years     Screening Not Indicated     Hepatitis C Screening Once for adults born between 80 and 1965  More frequently in patients at high risk for Hepatitis C Hep C Antibody: Not on file        Diabetes Screening 1-2 times per year if you're at risk for diabetes or have pre-diabetes Fasting glucose: 94 mg/dL (11/19/2021)  A1C: 5.3 % (11/19/2021)      Cholesterol Screening Once every 5 years if you don't have a lipid disorder. May order more often based on risk factors. Lipid panel: 11/19/2021  Screening Current      Other Preventive Screenings Covered by Medicare:  Abdominal Aortic Aneurysm (AAA) Screening: covered once if your at risk. You're considered to be at risk if you have a family history of AAA or a male between the age of 70-76 who smoking at least 100 cigarettes in your lifetime. Lung Cancer Screening: covers low dose CT scan once per year if you meet all of the following conditions: (1) Age 48-67; (2) No signs or symptoms of lung cancer; (3) Current smoker or have quit smoking within the last 15 years; (4) You have a tobacco smoking history of at least 20 pack years (packs per day x number of years you smoked); (5) You get a written order from a healthcare provider. Glaucoma Screening: covered annually if you're considered high risk: (1) You have diabetes OR (2) Family history of glaucoma OR (3)  aged 48 and older OR (3)  American aged 72 and older  Osteoporosis Screening: covered every 2 years if you meet one of the following conditions: (1) Have a vertebral abnormality; (2) On glucocorticoid therapy for more than 3 months; (3) Have primary hyperparathyroidism; (4) On osteoporosis medications and need to assess response to drug therapy. HIV Screening: covered annually if you're between the age of 14-79. Also covered annually if you are younger than 13 and older than 72 with risk factors for HIV infection. For pregnant patients, it is covered up to 3 times per pregnancy.     Immunizations:  Immunization Recommendations   Influenza Vaccine Annual influenza vaccination during flu season is recommended for all persons aged >= 6 months who do not have contraindications   Pneumococcal Vaccine   * Pneumococcal conjugate vaccine = PCV13 (Prevnar 13), PCV15 (Vaxneuvance), PCV20 (Prevnar 20)  * Pneumococcal polysaccharide vaccine = PPSV23 (Pneumovax) Adults 83-98 yo with certain risk factors or if 69+ yo  If never received any pneumonia vaccine: recommend Prevnar 20 (PCV20)  Give PCV20 if previously received 1 dose of PCV13 or PPSV23   Hepatitis B Vaccine 3 dose series if at intermediate or high risk (ex: diabetes, end stage renal disease, liver disease)   Respiratory syncytial virus (RSV) Vaccine - COVERED BY MEDICARE PART D  * RSVPreF3 (Arexvy) CDC recommends that adults 61years of age and older may receive a single dose of RSV vaccine using shared clinical decision-making (SCDM)   Tetanus (Td) Vaccine - COST NOT COVERED BY MEDICARE PART B Following completion of primary series, a booster dose should be given every 10 years to maintain immunity against tetanus. Td may also be given as tetanus wound prophylaxis. Tdap Vaccine - COST NOT COVERED BY MEDICARE PART B Recommended at least once for all adults. For pregnant patients, recommended with each pregnancy. Shingles Vaccine (Shingrix) - COST NOT COVERED BY MEDICARE PART B  2 shot series recommended in those 19 years and older who have or will have weakened immune systems or those 50 years and older     Health Maintenance Due:      Topic Date Due   • HIV Screening  11/21/2025 (Originally 1/3/2014)   • Hepatitis C Screening  12/21/2025 (Originally 1999)     Immunizations Due:      Topic Date Due   • HPV Vaccine (2 - Male 3-dose series) 02/26/2016   • COVID-19 Vaccine (3 - Pfizer series) 06/19/2021   • Influenza Vaccine (1) 09/01/2023     Advance Directives   What are advance directives? Advance directives are legal documents that state your wishes and plans for medical care. These plans are made ahead of time in case you lose your ability to make decisions for yourself. Advance directives can apply to any medical decision, such as the treatments you want, and if you want to donate organs. What are the types of advance directives? There are many types of advance directives, and each state has rules about how to use them.  You may choose a combination of any of the following:  Living will: This is a written record of the treatment you want. You can also choose which treatments you do not want, which to limit, and which to stop at a certain time. This includes surgery, medicine, IV fluid, and tube feedings. Durable power of  for healthcare [facility]): This is a written record that states who you want to make healthcare choices for you when you are unable to make them for yourself. This person, called a proxy, is usually a family member or a friend. You may choose more than 1 proxy. Do not resuscitate (DNR) order:  A DNR order is used in case your heart stops beating or you stop breathing. It is a request not to have certain forms of treatment, such as CPR. A DNR order may be included in other types of advance directives. Medical directive: This covers the care that you want if you are in a coma, near death, or unable to make decisions for yourself. You can list the treatments you want for each condition. Treatment may include pain medicine, surgery, blood transfusions, dialysis, IV or tube feedings, and a ventilator (breathing machine). Values history: This document has questions about your views, beliefs, and how you feel and think about life. This information can help others choose the care that you would choose. Why are advance directives important? An advance directive helps you control your care. Although spoken wishes may be used, it is better to have your wishes written down. Spoken wishes can be misunderstood, or not followed. Treatments may be given even if you do not want them. An advance directive may make it easier for your family to make difficult choices about your care. Weight Management   Why it is important to manage your weight:  Being overweight increases your risk of health conditions such as heart disease, high blood pressure, type 2 diabetes, and certain types of cancer.  It can also increase your risk for osteoarthritis, sleep apnea, and other respiratory problems. Aim for a slow, steady weight loss. Even a small amount of weight loss can lower your risk of health problems. How to lose weight safely:  A safe and healthy way to lose weight is to eat fewer calories and get regular exercise. You can lose up about 1 pound a week by decreasing the number of calories you eat by 500 calories each day. Healthy meal plan for weight management:  A healthy meal plan includes a variety of foods, contains fewer calories, and helps you stay healthy. A healthy meal plan includes the following:  Eat whole-grain foods more often. A healthy meal plan should contain fiber. Fiber is the part of grains, fruits, and vegetables that is not broken down by your body. Whole-grain foods are healthy and provide extra fiber in your diet. Some examples of whole-grain foods are whole-wheat breads and pastas, oatmeal, brown rice, and bulgur. Eat a variety of vegetables every day. Include dark, leafy greens such as spinach, kale, elizabeth greens, and mustard greens. Eat yellow and orange vegetables such as carrots, sweet potatoes, and winter squash. Eat a variety of fruits every day. Choose fresh or canned fruit (canned in its own juice or light syrup) instead of juice. Fruit juice has very little or no fiber. Eat low-fat dairy foods. Drink fat-free (skim) milk or 1% milk. Eat fat-free yogurt and low-fat cottage cheese. Try low-fat cheeses such as mozzarella and other reduced-fat cheeses. Choose meat and other protein foods that are low in fat. Choose beans or other legumes such as split peas or lentils. Choose fish, skinless poultry (chicken or turkey), or lean cuts of red meat (beef or pork). Before you cook meat or poultry, cut off any visible fat. Use less fat and oil. Try baking foods instead of frying them. Add less fat, such as margarine, sour cream, regular salad dressing and mayonnaise to foods. Eat fewer high-fat foods. Some examples of high-fat foods include french fries, doughnuts, ice cream, and cakes. Eat fewer sweets. Limit foods and drinks that are high in sugar. This includes candy, cookies, regular soda, and sweetened drinks. Exercise:  Exercise at least 30 minutes per day on most days of the week. Some examples of exercise include walking, biking, dancing, and swimming. You can also fit in more physical activity by taking the stairs instead of the elevator or parking farther away from stores. Ask your healthcare provider about the best exercise plan for you. © Copyright [employer] 2018 Information is for End User's use only and may not be sold, redistributed or otherwise used for commercial purposes. All illustrations and images included in CareNotes® are the copyrighted property of [employer] or [employer] [facility] Preventive Visit Patient Instructions  Thank you for completing your Welcome to Medicare Visit or Medicare Annual Wellness Visit today. Your next wellness visit will be due in one year (11/21/2024). The screening/preventive services that you may require over the next 5-10 years are detailed below. Some tests may not apply to you based off risk factors and/or age. Screening tests ordered at today's visit but not completed yet may show as past due. Also, please note that scanned in results may not display below. Preventive Screenings:  Service Recommendations Previous Testing/Comments   Colorectal Cancer Screening  Colonoscopy    Fecal Occult Blood Test (FOBT)/Fecal Immunochemical Test (FIT)  Fecal DNA/Cologuard Test  Flexible Sigmoidoscopy Age: 43-73 years old   Colonoscopy: every 10 years (May be performed more frequently if at higher risk)  OR  FOBT/FIT: every 1 year  OR  Cologuard: every 3 years  OR  Sigmoidoscopy: every 5 years  Screening may be recommended earlier than age 39 if at higher risk for colorectal cancer.  Also, an individualized decision between you and your healthcare provider will decide whether screening between the ages of 77-80 would be appropriate. Colonoscopy: Not on file  FOBT/FIT: Not on file  Cologuard: Not on file  Sigmoidoscopy: Not on file          Prostate Cancer Screening Individualized decision between patient and health care provider in men between ages of 53-66   Medicare will cover every 12 months beginning on the day after your 50th birthday PSA: No results in last 5 years     Screening Not Indicated     Hepatitis C Screening Once for adults born between 80 and 1965  More frequently in patients at high risk for Hepatitis C Hep C Antibody: Not on file        Diabetes Screening 1-2 times per year if you're at risk for diabetes or have pre-diabetes Fasting glucose: 94 mg/dL (11/19/2021)  A1C: 5.3 % (11/19/2021)      Cholesterol Screening Once every 5 years if you don't have a lipid disorder. May order more often based on risk factors. Lipid panel: 11/19/2021  Screening Current      Other Preventive Screenings Covered by Medicare:  Abdominal Aortic Aneurysm (AAA) Screening: covered once if your at risk. You're considered to be at risk if you have a family history of AAA or a male between the age of 70-76 who smoking at least 100 cigarettes in your lifetime. Lung Cancer Screening: covers low dose CT scan once per year if you meet all of the following conditions: (1) Age 48-67; (2) No signs or symptoms of lung cancer; (3) Current smoker or have quit smoking within the last 15 years; (4) You have a tobacco smoking history of at least 20 pack years (packs per day x number of years you smoked); (5) You get a written order from a healthcare provider.   Glaucoma Screening: covered annually if you're considered high risk: (1) You have diabetes OR (2) Family history of glaucoma OR (3)  aged 48 and older OR (3)  American aged 72 and older  Osteoporosis Screening: covered every 2 years if you meet one of the following conditions: (1) Have a vertebral abnormality; (2) On glucocorticoid therapy for more than 3 months; (3) Have primary hyperparathyroidism; (4) On osteoporosis medications and need to assess response to drug therapy. HIV Screening: covered annually if you're between the age of 14-79. Also covered annually if you are younger than 13 and older than 72 with risk factors for HIV infection. For pregnant patients, it is covered up to 3 times per pregnancy. Immunizations:  Immunization Recommendations   Influenza Vaccine Annual influenza vaccination during flu season is recommended for all persons aged >= 6 months who do not have contraindications   Pneumococcal Vaccine   * Pneumococcal conjugate vaccine = PCV13 (Prevnar 13), PCV15 (Vaxneuvance), PCV20 (Prevnar 20)  * Pneumococcal polysaccharide vaccine = PPSV23 (Pneumovax) Adults 73-79 yo with certain risk factors or if 69+ yo  If never received any pneumonia vaccine: recommend Prevnar 20 (PCV20)  Give PCV20 if previously received 1 dose of PCV13 or PPSV23   Hepatitis B Vaccine 3 dose series if at intermediate or high risk (ex: diabetes, end stage renal disease, liver disease)   Respiratory syncytial virus (RSV) Vaccine - COVERED BY MEDICARE PART D  * RSVPreF3 (Arexvy) CDC recommends that adults 61years of age and older may receive a single dose of RSV vaccine using shared clinical decision-making (SCDM)   Tetanus (Td) Vaccine - COST NOT COVERED BY MEDICARE PART B Following completion of primary series, a booster dose should be given every 10 years to maintain immunity against tetanus. Td may also be given as tetanus wound prophylaxis. Tdap Vaccine - COST NOT COVERED BY MEDICARE PART B Recommended at least once for all adults. For pregnant patients, recommended with each pregnancy.    Shingles Vaccine (Shingrix) - COST NOT COVERED BY MEDICARE PART B  2 shot series recommended in those 19 years and older who have or will have weakened immune systems or those 50 years and older     Health Maintenance Due:      Topic Date Due   • HIV Screening  11/21/2025 (Originally 1/3/2014)   • Hepatitis C Screening  12/21/2025 (Originally 1999)     Immunizations Due:      Topic Date Due   • HPV Vaccine (2 - Male 3-dose series) 02/26/2016   • COVID-19 Vaccine (3 - Pfizer series) 06/19/2021   • Influenza Vaccine (1) 09/01/2023     Advance Directives   What are advance directives? Advance directives are legal documents that state your wishes and plans for medical care. These plans are made ahead of time in case you lose your ability to make decisions for yourself. Advance directives can apply to any medical decision, such as the treatments you want, and if you want to donate organs. What are the types of advance directives? There are many types of advance directives, and each state has rules about how to use them. You may choose a combination of any of the following:  Living will: This is a written record of the treatment you want. You can also choose which treatments you do not want, which to limit, and which to stop at a certain time. This includes surgery, medicine, IV fluid, and tube feedings. Durable power of  for healthcare [facility] [place]): This is a written record that states who you want to make healthcare choices for you when you are unable to make them for yourself. This person, called a proxy, is usually a family member or a friend. You may choose more than 1 proxy. Do not resuscitate (DNR) order:  A DNR order is used in case your heart stops beating or you stop breathing. It is a request not to have certain forms of treatment, such as CPR. A DNR order may be included in other types of advance directives. Medical directive: This covers the care that you want if you are in a coma, near death, or unable to make decisions for yourself. You can list the treatments you want for each condition.  Treatment may include pain medicine, surgery, blood transfusions, dialysis, IV or tube feedings, and a ventilator (breathing machine). Values history: This document has questions about your views, beliefs, and how you feel and think about life. This information can help others choose the care that you would choose. Why are advance directives important? An advance directive helps you control your care. Although spoken wishes may be used, it is better to have your wishes written down. Spoken wishes can be misunderstood, or not followed. Treatments may be given even if you do not want them. An advance directive may make it easier for your family to make difficult choices about your care. Weight Management   Why it is important to manage your weight:  Being overweight increases your risk of health conditions such as heart disease, high blood pressure, type 2 diabetes, and certain types of cancer. It can also increase your risk for osteoarthritis, sleep apnea, and other respiratory problems. Aim for a slow, steady weight loss. Even a small amount of weight loss can lower your risk of health problems. How to lose weight safely:  A safe and healthy way to lose weight is to eat fewer calories and get regular exercise. You can lose up about 1 pound a week by decreasing the number of calories you eat by 500 calories each day. Healthy meal plan for weight management:  A healthy meal plan includes a variety of foods, contains fewer calories, and helps you stay healthy. A healthy meal plan includes the following:  Eat whole-grain foods more often. A healthy meal plan should contain fiber. Fiber is the part of grains, fruits, and vegetables that is not broken down by your body. Whole-grain foods are healthy and provide extra fiber in your diet. Some examples of whole-grain foods are whole-wheat breads and pastas, oatmeal, brown rice, and bulgur. Eat a variety of vegetables every day. Include dark, leafy greens such as spinach, kale, elizabeth greens, and mustard greens.  Eat yellow and orange vegetables such as carrots, sweet potatoes, and winter squash. Eat a variety of fruits every day. Choose fresh or canned fruit (canned in its own juice or light syrup) instead of juice. Fruit juice has very little or no fiber. Eat low-fat dairy foods. Drink fat-free (skim) milk or 1% milk. Eat fat-free yogurt and low-fat cottage cheese. Try low-fat cheeses such as mozzarella and other reduced-fat cheeses. Choose meat and other protein foods that are low in fat. Choose beans or other legumes such as split peas or lentils. Choose fish, skinless poultry (chicken or turkey), or lean cuts of red meat (beef or pork). Before you cook meat or poultry, cut off any visible fat. Use less fat and oil. Try baking foods instead of frying them. Add less fat, such as margarine, sour cream, regular salad dressing and mayonnaise to foods. Eat fewer high-fat foods. Some examples of high-fat foods include french fries, doughnuts, ice cream, and cakes. Eat fewer sweets. Limit foods and drinks that are high in sugar. This includes candy, cookies, regular soda, and sweetened drinks. Exercise:  Exercise at least 30 minutes per day on most days of the week. Some examples of exercise include walking, biking, dancing, and swimming. You can also fit in more physical activity by taking the stairs instead of the elevator or parking farther away from stores. Ask your healthcare provider about the best exercise plan for you. © Copyright [employer] 2018 Information is for End User's use only and may not be sold, redistributed or otherwise used for commercial purposes.  All illustrations and images included in CareNotes® are the copyrighted property of [employer]., Inc. or  [employer]

## 2023-12-11 ENCOUNTER — APPOINTMENT (OUTPATIENT)
Dept: LAB | Age: 23
End: 2023-12-11
Payer: COMMERCIAL

## 2023-12-11 DIAGNOSIS — Z79.899 LONG TERM CURRENT USE OF ANTIPSYCHOTIC MEDICATION: ICD-10-CM

## 2023-12-11 LAB
EST. AVERAGE GLUCOSE BLD GHB EST-MCNC: 111 MG/DL
HBA1C MFR BLD: 5.5 %

## 2023-12-11 PROCEDURE — 83036 HEMOGLOBIN GLYCOSYLATED A1C: CPT

## 2023-12-11 PROCEDURE — 36415 COLL VENOUS BLD VENIPUNCTURE: CPT

## 2024-01-24 NOTE — TELEPHONE ENCOUNTER
Patient said he is returning Dr Patrisha Olszewski call and will be available for the rest of the day and he can be reached at [phone] [Use of Plain Language] : use of plain language [Adequate] : adequate [None] : none

## 2024-02-01 ENCOUNTER — TELEPHONE (OUTPATIENT)
Dept: PSYCHIATRY | Facility: CLINIC | Age: 24
End: 2024-02-01

## 2024-02-01 NOTE — TELEPHONE ENCOUNTER
Notice received in EHR that risperidone is not on insurances formulary and will need to request an exception. 30 day supply given 1/29/24.

## 2024-02-05 NOTE — TELEPHONE ENCOUNTER
Completed PA for Risperidone 1 mg via [employer] and uploaded office notes.    Info sent to [employer] medic[employer] Part D for review.      Received following message via [employer]:  Your request has been approved  Authorization Expiration Date: 2/5/24-12/31/24

## 2024-03-19 ENCOUNTER — TELEPHONE (OUTPATIENT)
Dept: PSYCHIATRY | Facility: CLINIC | Age: 24
End: 2024-03-19

## 2024-03-19 NOTE — TELEPHONE ENCOUNTER
Spoke with patient to cancel 3/19 930AM due to provider being out of office. Will call Father later today to reschedule patients appt per patient request.

## 2024-03-31 DIAGNOSIS — R11.11 NON-INTRACTABLE VOMITING WITHOUT NAUSEA: ICD-10-CM

## 2024-04-01 RX ORDER — OMEPRAZOLE 20 MG/1
20 CAPSULE, DELAYED RELEASE ORAL
Qty: 90 CAPSULE | Refills: 1 | Status: SHIPPED | OUTPATIENT
Start: 2024-04-01

## 2024-04-29 NOTE — PSYCH
"MEDICATION MANAGEMENT NOTE        [facility]      Name and Date of Birth:  Bryon Lopez 24 y.o. 2000 MRN: [id number]    Date of Visit: April 30, 2024    Reason for Visit: Follow-up visit for medication management       SUBJECTIVE:    Bryon Lopez is a 24 y.o. male with past psychiatric history significant for ASD, ADHD, and ID (full-scale IQ 88 - see neuropsych testing completed 10/25/23 via media) who was personally seen and evaluated today at the [facility] outpatient clinic for follow-up and medication management. Bryon presents as calm, pleasant, and cooperative. His thoughts are logical. He completes assessment without difficulty.     Bryon endorses compliance with psychotropic medication regimen consisting of Risperdal and Strattera. He denies adverse medication side effects. Asif reports psychiatric stability since last visit. He continues to work on his Leeo journal. He was applauded for his efforts. Father shares that he has been more forgetful as of late. Joint problem solving utilized. He was encouraged to use this journal to create checklists to assist with his memory. Asif's sleep is \"good\". He shares that he is sleeping \"6 hours\" per evening which is his baseline. His appetite is stable. He denies SI/HI today. His energy and motivation are adequate. Asif states that with use of Atomoxetine, his concentration/focus is \"good\". Asif is without crying spells or anhedonia. He does report some sadness regarding the death of his grandfather on [facility]. Supportive therapy provided. Asif is without feelings of hopelessness or despair. 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 Asif denies new onset anxiety or panic attacks. He is not tense, on-edge, or restless today. sAif rates his overall state of MH as \"7/10\" (10 being best, 0 being worst). No recent aggression, mood swings, or behavior disturbances at home. " During today's examination, Bryon does not exhibit objective evidence of cassie/hypomania or psychosis. Bryon is not currently irritable, grandiose, labile, or pathologically euphoric. Bryon is without perceptual disturbances, such as A/V hallucinations, paranoia, ideas of reference, or delusional beliefs. Asif continues to work with Taz, his peer specialist. Bryon denies recent ETOH or illicit substance abuse. Bryon offers no further concerns.     Current Rating Scores:     None completed today.    Review Of Systems:      Constitutional negative   ENT negative   Cardiovascular negative   Respiratory negative   Gastrointestinal negative   Genitourinary negative   Musculoskeletal negative   Integumentary negative   Neurological negative   Endocrine negative   Other Symptoms none, all other systems are negative       Past Psychiatric History: (unchanged information from previous note copied and italicized) - Information that is bolded has been updated.      Inpatient psychiatric admissions: Denies  Prior outpatient psychiatric linkage: Previously linked with Dr. Eliud Kiser via [facility]  Past/current psychotherapy: Denies - Is actively involved in [facility] with assistant Taz who assists with life skills and counseling  History of suicidal attempts/gestures: Denies  History of violence/aggressive behaviors: No overt physical aggression but history of irritability and agitation with sister   Psychotropic medication trials: Klonopin, Ritalin (too stimulated, hyperactive), Risperdal, Strattera   Substance abuse inpatient/outpatient rehabilitation: Denies     Substance Abuse History: (unchanged information from previous note copied and italicized) - Information that is bolded has been updated.      No history of ETOH, illict substance, or tobacco abuse. No past legal actions or arrests secondary to substance intoxication. The patient denies prior DWIs/DUIs. No history of outpatient/inpatient rehabilitation  programs. Bryon does not exhibit objective evidence of substance withdrawal during today's examination nor does Bryon appear under the influence of any psychoactive substance.          Social History: (unchanged information from previous note copied and italicized) - Information that is bolded has been updated.      Developmental: Documented history of milestone/developmental delay. Bryon was born full-term via vaginal birth. No birth complications noted. Denies a history of in-utero exposure to toxins/illicit substances. There a no documented history of IEP and need for special education. Full-scale IQ is unknown at the moment. Bryon has been actively involved in treatment since the age of 4 secondary to ADHD, ASD, and PDD.  Education: high school diploma/GED  Marital history: single  Living arrangement, social support: father and sister (24 years old, named Richard) - mother passed away in 2010 secondary to terminal illness (cancer)  Occupational History: No longer working part-time at [employer] - seeking new employment via [employer]  Access to firearms: Father reports that there are firearms in the hope but denies that Bryon has direct access to these weapons/firearms. Bryon Lopez has no history of arrests or violence with a deadly weapon.         Traumatic History: (unchanged information from previous note copied and italicized) - Information that is bolded has been updated.      Abuse:none is reported  Other Traumatic Events: Denies    Past Medical History:    Past Medical History:   Diagnosis Date    ADHD (attention deficit hyperactivity disorder)     Autism spectrum disorder     GERD (gastroesophageal reflux disease)         Past Surgical History:   Procedure Laterality Date    NO PAST SURGERIES       No Known Allergies    Substance Abuse History:    Social History     Substance and Sexual Activity   Alcohol Use No     Social History     Substance and Sexual Activity   Drug Use Never       Social  History:    Social History     Socioeconomic History    Marital status: Single     Spouse name: Not on file    Number of children: Not on file    Years of education: Not on file    Highest education level: Not on file   Occupational History    Not on file   Tobacco Use    Smoking status: Never    Smokeless tobacco: Never   Vaping Use    Vaping status: Never Used   Substance and Sexual Activity    Alcohol use: No    Drug use: Never    Sexual activity: Not on file   Other Topics Concern    Not on file   Social History Narrative    Not on file     Social Determinants of Health     Financial Resource Strain: Low Risk  (11/14/2023)    Overall Financial Resource Strain (CARDIA)     Difficulty of Paying Living Expenses: Not very hard   Food Insecurity: Not on file   Transportation Needs: No Transportation Needs (11/14/2023)    PRAPARE - Transportation     Lack of Transportation (Medical): No     Lack of Transportation (Non-Medical): No   Physical Activity: Not on file   Stress: Not on file   Social Connections: Not on file   Intimate Partner Violence: Not on file   Housing Stability: Not on file       Family Psychiatric History:     Family History   Problem Relation Age of Onset    Lung cancer Mother     Diabetes Father     Alcohol abuse Father         in sustained remission    ADD / ADHD Sister     Autism Sister     Cancer Paternal Grandmother     No Known Problems Brother     No Known Problems Sister     No Known Problems Sister     No Known Problems Sister        History Review: The following portions of the patient's history were reviewed and updated as appropriate: allergies, current medications, past family history, past medical history, past social history, past surgical history, and problem list.         OBJECTIVE:     Vital signs in last 24 hours:    There were no vitals filed for this visit.    Mental Status Evaluation:    Appearance age appropriate, casually dressed, dressed appropriately, looks stated age  "  Behavior pleasant, cooperative, calm   Speech normal rate, normal volume, normal pitch   Mood \"Good\"   Affect normal range and intensity, appropriate   Thought Processes organized, coherent, goal directed   Associations intact associations   Thought Content no overt delusions   Perceptual Disturbances: no auditory hallucinations, no visual hallucinations   Abnormal Thoughts  Risk Potential Suicidal ideation - None at present  Homicidal ideation - None at present  Potential for aggression - No   Orientation oriented to person, place, time/date, and situation   Memory recent and remote memory grossly intact   Consciousness alert and awake   Attention Span Concentration Span attention span and concentration are age appropriate   Intellect appears to be of average intelligence   Insight intact and good   Judgement intact and good   Muscle Strength and  Gait normal gait and normal balance   Motor activity no abnormal movements   Language no difficulty naming common objects   Fund of Knowledge adequate knowledge of current events   Pain none   Pain Scale Did not ask patient to formally rate       Laboratory Results: I have personally reviewed all pertinent laboratory/tests results    Recent Labs (last 2 months):   No visits with results within 2 Month(s) from this visit.   Latest known visit with results is:   Appointment on 12/11/2023   Component Date Value    Hemoglobin A1C 12/11/2023 5.5     EAG 12/11/2023 111        Suicide/Homicide Risk Assessment:    The following interventions are recommended: no intervention changes needed      Lethality Statement:    Based on today's assessment and clinical criteria, Bryon [facility] contracts for safety and is not an imminent risk of harm to self or others. Outpatient level of care is deemed appropriate at this current time. Bryon understands that if they can no longer contract for safety, they need to call the office or report to their nearest Emergency Room for immediate " evaluation.      Assessment/Plan:     Bryon Lopez is a 24 y.o. male with past psychiatric history significant for ASD, ADHD, and ID (full-scale IQ 88 - see neuropsych testing completed 10/25/23 via media) who was personally seen and evaluated today at the [facility] outpatient clinic for follow-up and medication management. Bryon has been involved in psychiatric/neurologic treatment since the age of 4 and has been seeing Dr. Eliud Kiser at [facility] for the last 15 years. Bryon has been trialed on numerous psychotropic medications, including Risperdal, Straterra, Klonopin, and Ritalin. He endorses ongoing compliance with current medication regimen that consists of Risperdal 1mg AM, 2mg PM and Straterra 80mg Daily. Father reports that Bryon was excessively hyper, impulsive, and dysregulated during periods of psychostimulant use in the past, so these agents should be avoided. Bryon is currently in the process of being linked with Neurology as well secondary to PDD and ASD. Bryon has a history of IEP/504 plans, ritualistic behaviors, and is often rigid and inflexible in his cognitive struggle. Father reports a pervasive difficulty with social interaction and emotional reciprocity. Bryon is currently involved in numerous community-based resources to assist with life skills and to foster greater autonomy and independence. He meets with Taz from [facility] and [facility] frequently and finds pleasure in this. Father denies recent behavioral outbursts or physical aggression. Bryon' sister, who also has ASD, appears to be a major trigger for Bryon. However, father denies physical altercations. Bryon denies historical neurovegetative symptomatology suggestive of major depressive disorder or dysthymia. Bryon vehemently denies any acute or chronic history suggestive of an underlying affective (bipolar) organization. Bryon denies historical symptomatology suggestive  of an underlying psychotic process. Bryon denies historical symptomatology suggestive of PTSD, OCD, or disordered eating.      Today's Plan:     Psychopharmacologically, Asif reports benefit and tolerability associated with current regimen. He denies need for medication change or intervention.      DSM-V Diagnoses:      1.) Autism Spectrum Disorder  2.) ADHD  3.) ID        Treatment Recommendations/Precautions:        1.) Autism Spectrum Disorder  - Continue Risperdal 1mg AM, 2mg PM secondary to agitation and irritability associated with ASD  - Continue engagement in weekly socialization with Taz via [facility] - learning life skills, etc.   - Psychoeducation provided regarding the importance of exercise and healthy dietary choices and their impact on mood, energy, and motivation     2.) ADHD  - Continue Straterra 80mg Daily              - Hx of paradoxical excitation with psychostimulant use (Ritalin) - avoid         3.) ID  - Full scale IQ 88  - Completed updated neuropsych testing 10/25/23 - scanned into media   - Continue Risperdal 1mg AM, 2mg PM secondary to agitation and irritability associated with ASD      Medication management every 4 months  Aware of need to follow up with family physician for medical issues  Aware of 24 hour and weekend coverage for urgent situations accessed by calling [facility] main practice number    Medications Risks/Benefits      Risks, Benefits And Possible Side Effects Of Medications:    Risks, benefits, and possible side effects of medications explained to Bryon including risk of parkinsonian symptoms, Tardive Dyskinesia and metabolic syndrome related to treatment with antipsychotic medications. He verbalizes understanding and agreement for treatment.    Controlled Medication Discussion:     Not applicable    Psychotherapy Provided:     Individual psychotherapy provided: No     Treatment Plan:    Completed and signed during the session: Yes - with  Bryon      Visit Time    Visit Start Time: 12:30 PM  Visit Stop Time: 1:00 PM  Total Visit Duration:  30 minutes     The total visit duration detailed above includes: patient engagement, medication management, psychotherapy/counseling, discussion regarding treatment goals, documentation, review of past medical records, and coordination of care.      Mario Benoit MD  Board Certified Diplomate of the American Board of Psychiatry and Neurology  04/30/24

## 2024-04-30 ENCOUNTER — TELEPHONE (OUTPATIENT)
Dept: PSYCHIATRY | Facility: CLINIC | Age: 24
End: 2024-04-30

## 2024-04-30 ENCOUNTER — OFFICE VISIT (OUTPATIENT)
Dept: PSYCHIATRY | Facility: CLINIC | Age: 24
End: 2024-04-30

## 2024-04-30 DIAGNOSIS — F84.0 AUTISM SPECTRUM DISORDER: Primary | ICD-10-CM

## 2024-04-30 DIAGNOSIS — F84.9 PERVASIVE DEVELOPMENTAL DISORDER, RESIDUAL: ICD-10-CM

## 2024-04-30 DIAGNOSIS — F79 INTELLECTUAL DISABILITY: ICD-10-CM

## 2024-04-30 DIAGNOSIS — F90.2 ADHD (ATTENTION DEFICIT HYPERACTIVITY DISORDER), COMBINED TYPE: ICD-10-CM

## 2024-04-30 DIAGNOSIS — F41.9 ANXIETY: ICD-10-CM

## 2024-04-30 NOTE — TELEPHONE ENCOUNTER
Called and spoke with patient regarding a follow up for 5 months (9/30/24). Father will call the office to schedule. Please assist. Thank you.

## 2024-04-30 NOTE — BH TREATMENT PLAN
TREATMENT PLAN (Medication Management Only)        [facility]      Name and Date of Birth:  Bryon Lopez 24 y.o. 2000 MRN: [id number]    Date of Visit: April 30, 2024    Diagnosis/Diagnoses:    1. Autism spectrum disorder        2. Anxiety        3. ADHD (attention deficit hyperactivity disorder), combined type        4. Pervasive developmental disorder, residual        5. Intellectual disability            Strengths/Personal Resources for Self-Care: supportive family, taking medications as prescribed, ability to adapt to life changes, ability to communicate well, motivation for treatment, ability to negotiate basic needs.    Area/Areas of need (in own words): mood instability  1. Long Term Goal: maintain control of mood.  Target Date:6 months - 10/30/2024  Person/Persons responsible for completion of goal: Bryon  2.  Short Term Objective (s) - How will we reach this goal?:   A. Provider new recommended medication/dosage changes and/or continue medication(s): continue current medications as prescribed.  B. Keep regularly scheduled psychiatric appointments  C. Maintain adherence to psychotropic medication regimen   D. Continue to spend time with Taz  E. Maintain appropriate dietary intake  F. Practice adequate sleep hygiene    Target Date:6 months - 10/30/2024  Person/Persons Responsible for Completion of Goal: Bryon    Progress Towards Goals: continuing treatment    Treatment Modality: medication management every 3 months    Review due 180 days from date of this plan: 6 months - 10/30/2024  Expected length of service: ongoing treatment    My Physician/PA/NP and I have developed this plan together and I agree to work on the goals and objectives. I understand the treatment goals that were developed for my treatment. Treatment Plan was completed with Bryon's assistance and input throughout today's session. Bryon consented to the information  provided and documented above. Unfortunately, treatment plan was not signed at the time of this office visit secondary to issues related to signature keypad.

## 2024-05-01 ENCOUNTER — TELEPHONE (OUTPATIENT)
Dept: PSYCHIATRY | Facility: CLINIC | Age: 24
End: 2024-05-01

## 2024-05-01 NOTE — TELEPHONE ENCOUNTER
Father of patient LVM to get the office email.    Writer called and spoke with father of patient. Father of virgilio stated they needed the information about ordered blood work and was driving so they were unable to take down the email.    Writer transferred call to nurses line to assist with blood work and called back father and LVM with [email or web address]

## 2024-05-01 NOTE — TELEPHONE ENCOUNTER
Lucio (father) left a message on the Nurse Line asking if Dr. Benoit needs any labs completed for Elia?      Please advise.

## 2024-05-02 NOTE — TELEPHONE ENCOUNTER
No other bloodwork needed aside from the Lipid Panel, CBC, and CMP that are active since September 2023. Thank you!

## 2024-05-02 NOTE — TELEPHONE ENCOUNTER
Dr. Benoit,    Blood work ordered by PCP in September 2023, still pending. Any other blood work needed?

## 2024-05-02 NOTE — TELEPHONE ENCOUNTER
"Spoke with Lucio. Reviewed blood work noted for \"Future\" (ordered by PCP in September 2023) and need for 12 hour fast. He verbalized understanding. Informed him nurse will call him if blood work other than what was discussed is needed.    Lucio requested the email to forward papers to Dr. Benoit, but could not take the email. Per discussion, called and left a VM with the email address and the nursing number to call as needed.   "

## 2024-05-02 NOTE — TELEPHONE ENCOUNTER
Yes, I informed Asif on Tuesday that labs were placed for metabolic monitoring. He should complete these within the next month. Clinical staff - please inform his father of this. He needs to fast for 12 hours prior to these labs. Thank you.

## 2024-05-07 ENCOUNTER — APPOINTMENT (OUTPATIENT)
Dept: LAB | Age: 24
End: 2024-05-07
Payer: COMMERCIAL

## 2024-05-07 DIAGNOSIS — Z13.6 ENCOUNTER FOR LIPID SCREENING FOR CARDIOVASCULAR DISEASE: ICD-10-CM

## 2024-05-07 DIAGNOSIS — Z13.220 ENCOUNTER FOR LIPID SCREENING FOR CARDIOVASCULAR DISEASE: ICD-10-CM

## 2024-05-07 DIAGNOSIS — F41.9 ANXIETY: ICD-10-CM

## 2024-05-07 DIAGNOSIS — F90.2 ADHD (ATTENTION DEFICIT HYPERACTIVITY DISORDER), COMBINED TYPE: ICD-10-CM

## 2024-05-07 LAB
ALBUMIN SERPL BCP-MCNC: 4.7 G/DL (ref 3.5–5)
ALP SERPL-CCNC: 56 U/L (ref 34–104)
ALT SERPL W P-5'-P-CCNC: 21 U/L (ref 7–52)
ANION GAP SERPL CALCULATED.3IONS-SCNC: 9 MMOL/L (ref 4–13)
AST SERPL W P-5'-P-CCNC: 26 U/L (ref 13–39)
BILIRUB SERPL-MCNC: 0.85 MG/DL (ref 0.2–1)
BUN SERPL-MCNC: 12 MG/DL (ref 5–25)
CALCIUM SERPL-MCNC: 9 MG/DL (ref 8.4–10.2)
CHLORIDE SERPL-SCNC: 105 MMOL/L (ref 96–108)
CHOLEST SERPL-MCNC: 127 MG/DL
CO2 SERPL-SCNC: 25 MMOL/L (ref 21–32)
CREAT SERPL-MCNC: 0.91 MG/DL (ref 0.6–1.3)
ERYTHROCYTE [DISTWIDTH] IN BLOOD BY AUTOMATED COUNT: 12.9 % (ref 11.6–15.1)
GFR SERPL CREATININE-BSD FRML MDRD: 116 ML/MIN/1.73SQ M
GLUCOSE P FAST SERPL-MCNC: 100 MG/DL (ref 65–99)
HCT VFR BLD AUTO: 46.5 % (ref 36.5–49.3)
HDLC SERPL-MCNC: 44 MG/DL
HGB BLD-MCNC: 15.4 G/DL (ref 12–17)
LDLC SERPL CALC-MCNC: 70 MG/DL (ref 0–100)
MCH RBC QN AUTO: 28.2 PG (ref 26.8–34.3)
MCHC RBC AUTO-ENTMCNC: 33.1 G/DL (ref 31.4–37.4)
MCV RBC AUTO: 85 FL (ref 82–98)
NONHDLC SERPL-MCNC: 83 MG/DL
PLATELET # BLD AUTO: 262 THOUSANDS/UL (ref 149–390)
PMV BLD AUTO: 9.5 FL (ref 8.9–12.7)
POTASSIUM SERPL-SCNC: 4.2 MMOL/L (ref 3.5–5.3)
PROT SERPL-MCNC: 7.1 G/DL (ref 6.4–8.4)
RBC # BLD AUTO: 5.46 MILLION/UL (ref 3.88–5.62)
SODIUM SERPL-SCNC: 139 MMOL/L (ref 135–147)
TRIGL SERPL-MCNC: 63 MG/DL
WBC # BLD AUTO: 7.28 THOUSAND/UL (ref 4.31–10.16)

## 2024-05-07 PROCEDURE — 85027 COMPLETE CBC AUTOMATED: CPT

## 2024-05-07 PROCEDURE — 36415 COLL VENOUS BLD VENIPUNCTURE: CPT

## 2024-05-07 PROCEDURE — 80061 LIPID PANEL: CPT

## 2024-05-07 PROCEDURE — 80053 COMPREHEN METABOLIC PANEL: CPT

## 2024-05-19 ENCOUNTER — RA CDI HCC (OUTPATIENT)
Dept: OTHER | Facility: HOSPITAL | Age: 24
End: 2024-05-19

## 2024-05-28 ENCOUNTER — OFFICE VISIT (OUTPATIENT)
Dept: INTERNAL MEDICINE CLINIC | Facility: OTHER | Age: 24
End: 2024-05-28
Payer: COMMERCIAL

## 2024-05-28 VITALS
DIASTOLIC BLOOD PRESSURE: 72 MMHG | WEIGHT: 177.8 LBS | RESPIRATION RATE: 18 BRPM | BODY MASS INDEX: 24.89 KG/M2 | TEMPERATURE: 99 F | HEART RATE: 101 BPM | SYSTOLIC BLOOD PRESSURE: 116 MMHG | HEIGHT: 71 IN | OXYGEN SATURATION: 97 %

## 2024-05-28 DIAGNOSIS — F79 INTELLECTUAL DISABILITY: ICD-10-CM

## 2024-05-28 DIAGNOSIS — F41.9 ANXIETY: ICD-10-CM

## 2024-05-28 DIAGNOSIS — K21.9 GASTROESOPHAGEAL REFLUX DISEASE WITHOUT ESOPHAGITIS: ICD-10-CM

## 2024-05-28 DIAGNOSIS — F90.2 ADHD (ATTENTION DEFICIT HYPERACTIVITY DISORDER), COMBINED TYPE: Primary | ICD-10-CM

## 2024-05-28 DIAGNOSIS — F84.0 AUTISM SPECTRUM DISORDER: ICD-10-CM

## 2024-05-28 DIAGNOSIS — F84.9 PERVASIVE DEVELOPMENTAL DISORDER, RESIDUAL: ICD-10-CM

## 2024-05-28 PROCEDURE — G2211 COMPLEX E/M VISIT ADD ON: HCPCS | Performed by: INTERNAL MEDICINE

## 2024-05-28 PROCEDURE — 99214 OFFICE O/P EST MOD 30 MIN: CPT | Performed by: INTERNAL MEDICINE

## 2024-05-28 NOTE — PROGRESS NOTES
Assessment/Plan:    Gastroesophageal reflux disease without esophagitis  Controlled, continue omeprazole 20 mg daily.     ADHD (attention deficit hyperactivity disorder), combined type  Controlled, continue Strattera 80 mg daily.     Anxiety  Stable, controlled. Continue risperidone.     Autism spectrum disorder  Continue supportive care.        Diagnoses and all orders for this visit:    ADHD (attention deficit hyperactivity disorder), combined type    Gastroesophageal reflux disease without esophagitis    Anxiety    Autism spectrum disorder    Pervasive developmental disorder, residual    Intellectual disability                  Subjective:      Patient ID: Bryon Lopez is a 24 y.o. male.    Chief Complaint   Patient presents with   • Follow-up     6 month - labs done 5/7/24.    •      phq       Bryon Lopez is seen today for follow up of chronic conditions.   Recent laboratory studies reviewed today with the patient.   he has been compliant with his medication regimen.     he has no complaints or concerns at this time.       Heartburn  He reports no abdominal pain, no chest pain, no choking, no coughing, no nausea, no sore throat or no wheezing. This is a chronic problem. The current episode started more than 1 year ago. The problem occurs rarely. The problem has been unchanged. The symptoms are aggravated by certain foods. Pertinent negatives include no fatigue. He has tried a PPI for the symptoms. The treatment provided moderate relief.       The following portions of the patient's history were reviewed and updated as appropriate: allergies, current medications, past family history, past medical history, past social history, past surgical history, and problem list.    Review of Systems   Constitutional:  Negative for activity change, appetite change, chills, diaphoresis, fatigue and fever.   HENT:  Negative for congestion, postnasal drip, rhinorrhea, sinus pressure, sinus pain, sneezing and sore  throat.    Eyes:  Negative for visual disturbance.   Respiratory:  Negative for apnea, cough, choking, chest tightness, shortness of breath and wheezing.    Cardiovascular:  Negative for chest pain, palpitations and leg swelling.   Gastrointestinal:  Negative for abdominal distention, abdominal pain, anal bleeding, blood in stool, constipation, diarrhea, nausea and vomiting.   Endocrine: Negative for cold intolerance and heat intolerance.   Genitourinary:  Negative for difficulty urinating, dysuria and hematuria.   Musculoskeletal: Negative.    Skin: Negative.    Neurological:  Negative for dizziness, weakness, light-headedness, numbness and headaches.   Hematological:  Negative for adenopathy.   Psychiatric/Behavioral:  Negative for agitation, sleep disturbance and suicidal ideas.    All other systems reviewed and are negative.        Past Medical History:   Diagnosis Date   • ADHD (attention deficit hyperactivity disorder)    • Autism spectrum disorder    • GERD (gastroesophageal reflux disease)          Current Outpatient Medications:   •  atomoxetine (STRATTERA) 80 MG capsule, Take 1 capsule (80 mg total) by mouth daily, Disp: 90 capsule, Rfl: 5  •  MULTIPLE VITAMINS-MINERALS ER PO, Take by mouth, Disp: , Rfl:   •  omeprazole (PriLOSEC) 20 mg delayed release capsule, Take 1 capsule (20 mg total) by mouth daily before breakfast, Disp: 90 capsule, Rfl: 1  •  risperiDONE (RisperDAL) 1 mg tablet, Take 1 tablet (1 mg total) by mouth 3 (three) times a day, Disp: 270 tablet, Rfl: 5    No Known Allergies    Social History   Past Surgical History:   Procedure Laterality Date   • NO PAST SURGERIES       Family History   Problem Relation Age of Onset   • Lung cancer Mother    • Diabetes Father    • Alcohol abuse Father         in sustained remission   • Skin cancer Father    • ADD / ADHD Sister    • Autism Sister    • No Known Problems Sister    • No Known Problems Sister    • No Known Problems Sister    • No Known  "Problems Brother    • Cancer Paternal Grandmother        Objective:  /72 (BP Location: Left arm, Patient Position: Sitting, Cuff Size: Standard)   Pulse 101   Temp 99 °F (37.2 °C) (Temporal)   Resp 18   Ht 5' 11\" (1.803 m)   Wt 80.6 kg (177 lb 12.8 oz)   SpO2 97%   BMI 24.80 kg/m²     Recent Results (from the past 1344 hour(s))   CBC    Collection Time: 05/07/24 10:15 AM   Result Value Ref Range    WBC 7.28 4.31 - 10.16 Thousand/uL    RBC 5.46 3.88 - 5.62 Million/uL    Hemoglobin 15.4 12.0 - 17.0 g/dL    Hematocrit 46.5 36.5 - 49.3 %    MCV 85 82 - 98 fL    MCH 28.2 26.8 - 34.3 pg    MCHC 33.1 31.4 - 37.4 g/dL    RDW 12.9 11.6 - 15.1 %    Platelets 262 149 - 390 Thousands/uL    MPV 9.5 8.9 - 12.7 fL   Comprehensive metabolic panel    Collection Time: 05/07/24 10:15 AM   Result Value Ref Range    Sodium 139 135 - 147 mmol/L    Potassium 4.2 3.5 - 5.3 mmol/L    Chloride 105 96 - 108 mmol/L    CO2 25 21 - 32 mmol/L    ANION GAP 9 4 - 13 mmol/L    BUN 12 5 - 25 mg/dL    Creatinine 0.91 0.60 - 1.30 mg/dL    Glucose, Fasting 100 (H) 65 - 99 mg/dL    Calcium 9.0 8.4 - 10.2 mg/dL    AST 26 13 - 39 U/L    ALT 21 7 - 52 U/L    Alkaline Phosphatase 56 34 - 104 U/L    Total Protein 7.1 6.4 - 8.4 g/dL    Albumin 4.7 3.5 - 5.0 g/dL    Total Bilirubin 0.85 0.20 - 1.00 mg/dL    eGFR 116 ml/min/1.73sq m   Lipid panel    Collection Time: 05/07/24 10:15 AM   Result Value Ref Range    Cholesterol 127 See Comment mg/dL    Triglycerides 63 See Comment mg/dL    HDL, Direct 44 >=40 mg/dL    LDL Calculated 70 0 - 100 mg/dL    Non-HDL-Chol (CHOL-HDL) 83 mg/dl            Physical Exam  Vitals and nursing note reviewed.   Constitutional:       General: He is not in acute distress.     Appearance: He is well-developed. He is not diaphoretic.   HENT:      Head: Normocephalic and atraumatic.   Eyes:      General: No scleral icterus.        Right eye: No discharge.         Left eye: No discharge.      Conjunctiva/sclera: Conjunctivae " normal.      Pupils: Pupils are equal, round, and reactive to light.   Neck:      Thyroid: No thyromegaly.      Vascular: No JVD.   Cardiovascular:      Rate and Rhythm: Normal rate and regular rhythm.      Heart sounds: Normal heart sounds. No murmur heard.     No friction rub. No gallop.   Pulmonary:      Effort: Pulmonary effort is normal. No respiratory distress.      Breath sounds: Normal breath sounds. No wheezing or rales.   Chest:      Chest wall: No tenderness.   Abdominal:      General: Bowel sounds are normal. There is no distension.      Palpations: Abdomen is soft. There is no mass.      Tenderness: There is no abdominal tenderness. There is no guarding or rebound.   Musculoskeletal:         General: No tenderness or deformity. Normal range of motion.      Cervical back: Normal range of motion and neck supple.   Lymphadenopathy:      Cervical: No cervical adenopathy.   Skin:     General: Skin is warm and dry.      Coloration: Skin is not pale.      Findings: No erythema or rash.   Neurological:      Mental Status: He is alert and oriented to person, place, and time.      Cranial Nerves: No cranial nerve deficit.      Coordination: Coordination normal.      Deep Tendon Reflexes: Reflexes are normal and symmetric.   Psychiatric:         Behavior: Behavior normal.         Thought Content: Thought content normal.         Judgment: Judgment normal.

## 2024-09-05 ENCOUNTER — TELEPHONE (OUTPATIENT)
Dept: GASTROENTEROLOGY | Facility: CLINIC | Age: 24
End: 2024-09-05

## 2024-09-05 NOTE — TELEPHONE ENCOUNTER
Scheduled date of EGD(as of today): 9/12/24  Physician performing EGD: Dr Pierre   Location of EGD: [facility]  Instructions reviewed with patient by: ls via email   Clearances: n/a      ASC Screening    ASC Screening  BMI > than 45: No  Are you currently pregnant?: No  Do you rely on a wheelchair for mobility?: No  Do you need oxygen during the day?: No  Have you ever been informed by anesthesia that you have a difficult airway?: No  Have you been diagnosed with End Stage Renal Disease (ESRD)?: No  Are you actively on dialysis?: No  Have you been diagnosed with Pulmonary Hypertension?: No  Do you have a pacemaker or an Automatic Implantable Cardioverter Defibrillator (AICD)?: No  Have you ever had an organ transplant?: No  Have you had a stroke, heart attack, myocardial infarction (MI) within the last 6 months?: No  Have you ever been diagnosed with Aortic Stenosis?: No  Have you ever been diagnosed  with Congestive Heart Failure?: No  Have you ever been diagnosed with a heart valve disease?: No  Are you Diabetic?: No  If you are Diabetic, has your A1C been greater than 12 within the last six months?: No

## 2024-09-05 NOTE — TELEPHONE ENCOUNTER
Pt is due for an EGD with Dr Pierre for hx of esophageal columnar metaplasia, Dysphagia, GERD. Called and spoke to father and scheduled.     OA Questions for EGD  Date: [  ]  Screened by: [  ]     Referring Provider: [Dr Pierre]     Pre-Screening: BMI [  ]    Past EGD? If yes - Date: [   ]  Physician/Facility: [   ]  Reason: [   ]     SCHEDULING STAFF: If the patient is over 75 years old, please schedule an office visit.  ·      Does the patient want to see a gastroenterologist prior to their procedure to discuss any GI symptoms? no  ·      Has the patient been hospitalized or had abdominal surgery in the past 6 months? no  ·      Does the patient use supplemental oxygen? no  ·      Does the patient take [Coumadin], [Lovenox], [Plavix], [Eliquis], [Xarelto], or other blood thinning medication? [Yes] [No] no  ·      Has the patient had a stroke, cardiac event, or stent placed in the past year? [Yes] [No] no     SCHEDULING STAFF: If patient answers NO to the above questions, then schedule the procedure. If patient answers YES to any of the above questions, then schedule an office appointment.  ·       If a repeat EGD is belated and patient declines procedure à notify provider.

## 2024-09-12 ENCOUNTER — HOSPITAL ENCOUNTER (OUTPATIENT)
Dept: GASTROENTEROLOGY | Facility: AMBULARY SURGERY CENTER | Age: 24
Setting detail: OUTPATIENT SURGERY
Discharge: HOME | End: 2024-09-12
Attending: INTERNAL MEDICINE
Payer: COMMERCIAL

## 2024-09-12 ENCOUNTER — ANESTHESIA EVENT (OUTPATIENT)
Dept: GASTROENTEROLOGY | Facility: AMBULARY SURGERY CENTER | Age: 24
End: 2024-09-12
Payer: COMMERCIAL

## 2024-09-12 VITALS
HEIGHT: 70 IN | HEART RATE: 79 BPM | DIASTOLIC BLOOD PRESSURE: 68 MMHG | SYSTOLIC BLOOD PRESSURE: 121 MMHG | RESPIRATION RATE: 18 BRPM | OXYGEN SATURATION: 96 % | WEIGHT: 168 LBS | TEMPERATURE: 97.7 F | BODY MASS INDEX: 24.05 KG/M2

## 2024-09-12 DIAGNOSIS — Z00.6 ENCOUNTER FOR EXAMINATION FOR NORMAL COMPARISON OR CONTROL IN CLINICAL RESEARCH PROGRAM: ICD-10-CM

## 2024-09-12 DIAGNOSIS — R13.10 DYSPHAGIA, UNSPECIFIED TYPE: ICD-10-CM

## 2024-09-12 DIAGNOSIS — K31.A0 GASTRIC INTESTINAL METAPLASIA: ICD-10-CM

## 2024-09-12 DIAGNOSIS — K21.9 CHRONIC GERD: ICD-10-CM

## 2024-09-12 PROCEDURE — 43235 EGD DIAGNOSTIC BRUSH WASH: CPT | Performed by: INTERNAL MEDICINE

## 2024-09-12 RX ORDER — SODIUM CHLORIDE, SODIUM LACTATE, POTASSIUM CHLORIDE, CALCIUM CHLORIDE 600; 310; 30; 20 MG/100ML; MG/100ML; MG/100ML; MG/100ML
INJECTION, SOLUTION INTRAVENOUS CONTINUOUS PRN
Status: DISCONTINUED | OUTPATIENT
Start: 2024-09-12 | End: 2024-09-12

## 2024-09-12 RX ORDER — LIDOCAINE HYDROCHLORIDE 20 MG/ML
INJECTION, SOLUTION EPIDURAL; INFILTRATION; INTRACAUDAL; PERINEURAL AS NEEDED
Status: DISCONTINUED | OUTPATIENT
Start: 2024-09-12 | End: 2024-09-12

## 2024-09-12 RX ORDER — FENTANYL CITRATE 50 UG/ML
INJECTION, SOLUTION INTRAMUSCULAR; INTRAVENOUS AS NEEDED
Status: DISCONTINUED | OUTPATIENT
Start: 2024-09-12 | End: 2024-09-12

## 2024-09-12 RX ORDER — PROPOFOL 10 MG/ML
INJECTION, EMULSION INTRAVENOUS AS NEEDED
Status: DISCONTINUED | OUTPATIENT
Start: 2024-09-12 | End: 2024-09-12

## 2024-09-12 RX ADMIN — LIDOCAINE HYDROCHLORIDE 100 MG: 20 INJECTION, SOLUTION EPIDURAL; INFILTRATION; INTRACAUDAL; PERINEURAL at 14:26

## 2024-09-12 RX ADMIN — PROPOFOL 140 MG: 10 INJECTION, EMULSION INTRAVENOUS at 14:26

## 2024-09-12 RX ADMIN — FENTANYL CITRATE 50 MCG: 50 INJECTION INTRAMUSCULAR; INTRAVENOUS at 14:23

## 2024-09-12 RX ADMIN — SODIUM CHLORIDE, SODIUM LACTATE, POTASSIUM CHLORIDE, AND CALCIUM CHLORIDE: .6; .31; .03; .02 INJECTION, SOLUTION INTRAVENOUS at 14:13

## 2024-09-12 NOTE — ANESTHESIA PREPROCEDURE EVALUATION
Procedure:  EGD    Relevant Problems   GI/HEPATIC   (+) Gastroesophageal reflux disease without esophagitis      NEURO/PSYCH   (+) Anxiety        Physical Exam    Airway    Mallampati score: II  TM Distance: >3 FB  Neck ROM: full     Dental   No notable dental hx     Cardiovascular  Rhythm: regular, Rate: normal    Pulmonary   Breath sounds clear to auscultation    Other Findings        Anesthesia Plan  ASA Score- 2     Anesthesia Type- IV sedation with anesthesia with ASA Monitors.         Additional Monitors:     Airway Plan:            Plan Factors-Exercise tolerance (METS): >4 METS.    Chart reviewed.   Existing labs reviewed. Patient summary reviewed.    Patient is not a current smoker.              Induction- intravenous.    Postoperative Plan-     Perioperative Resuscitation Plan - Level 1 - Full Code.       Informed Consent- Anesthetic plan and risks discussed with patient and father.  I personally reviewed this patient with the CRNA. Discussed and agreed on the Anesthesia Plan with the CRNA..

## 2024-09-12 NOTE — H&P
"History and Physical -  [facility]  Bryon Mirelesandi 24 y.o. male MRN: [id number]        HPI: 24-year-old male with history of GERD had upper endoscopy 3 years ago which showed irregular Z-line with small columnar mucosal extensions and the biopsies were negative for Lu's at that time.  Patient reports doing well.    Historical Information   Past Medical History:   Diagnosis Date    ADHD (attention deficit hyperactivity disorder)     Autism spectrum disorder     GERD (gastroesophageal reflux disease)      Past Surgical History:   Procedure Laterality Date    NO PAST SURGERIES       Social History   Social History     Substance and Sexual Activity   Alcohol Use No     Social History     Substance and Sexual Activity   Drug Use Never     Social History     Tobacco Use   Smoking Status Never   Smokeless Tobacco Never     Family History   Problem Relation Age of Onset    Lung cancer Mother     Diabetes Father     Alcohol abuse Father         in sustained remission    Skin cancer Father     ADD / ADHD Sister     Autism Sister     No Known Problems Sister     No Known Problems Sister     No Known Problems Sister     No Known Problems Brother     Cancer Paternal Grandmother        Meds/Allergies     Not in a hospital admission.    No Known Allergies    Objective     Blood pressure 143/76, pulse 99, temperature 98.1 °F (36.7 °C), temperature source Temporal, resp. rate 18, height 5' 10\" (1.778 m), weight 76.2 kg (168 lb), SpO2 98%.    Physical Exam:    Chest- CTA  Heart- RRR  Abdomen- NT/ND  Extremities- No edema    ASSESSMENT:     GERD    PLAN:    EGD              "

## 2024-09-12 NOTE — ANESTHESIA POSTPROCEDURE EVALUATION
Post-Op Assessment Note    CV Status:  Stable  Pain Score: 0    Pain management: adequate       Mental Status:  Awake and alert   Hydration Status:  Stable   PONV Controlled:  None   Airway Patency:  Patent and adequate     Post Op Vitals Reviewed: Yes    No anethesia notable event occurred.    Staff: CRNA, Anesthesiologist               BP   122/73   Temp      Pulse  95   Resp   16   SpO2   99%

## 2024-11-11 ENCOUNTER — APPOINTMENT (OUTPATIENT)
Dept: LAB | Age: 24
End: 2024-11-11

## 2024-11-11 DIAGNOSIS — Z00.6 ENCOUNTER FOR EXAMINATION FOR NORMAL COMPARISON OR CONTROL IN CLINICAL RESEARCH PROGRAM: ICD-10-CM

## 2024-11-11 PROCEDURE — 36415 COLL VENOUS BLD VENIPUNCTURE: CPT

## 2024-11-15 ENCOUNTER — TELEPHONE (OUTPATIENT)
Age: 24
End: 2024-11-15

## 2024-11-15 NOTE — TELEPHONE ENCOUNTER
Father of patient called in looking to schedule patients medication management follow up.    Patient is now scheduled on 11/18/24 @1:30pm in office.

## 2024-11-18 ENCOUNTER — OFFICE VISIT (OUTPATIENT)
Dept: PSYCHIATRY | Facility: CLINIC | Age: 24
End: 2024-11-18
Payer: COMMERCIAL

## 2024-11-18 DIAGNOSIS — F84.0 AUTISM SPECTRUM DISORDER: Primary | ICD-10-CM

## 2024-11-18 DIAGNOSIS — F84.9 PERVASIVE DEVELOPMENTAL DISORDER, RESIDUAL: ICD-10-CM

## 2024-11-18 DIAGNOSIS — F90.2 ADHD (ATTENTION DEFICIT HYPERACTIVITY DISORDER), COMBINED TYPE: ICD-10-CM

## 2024-11-18 PROCEDURE — G2211 COMPLEX E/M VISIT ADD ON: HCPCS | Performed by: STUDENT IN AN ORGANIZED HEALTH CARE EDUCATION/TRAINING PROGRAM

## 2024-11-18 PROCEDURE — 99214 OFFICE O/P EST MOD 30 MIN: CPT | Performed by: STUDENT IN AN ORGANIZED HEALTH CARE EDUCATION/TRAINING PROGRAM

## 2024-11-18 RX ORDER — ATOMOXETINE 80 MG/1
80 CAPSULE ORAL DAILY
Qty: 90 CAPSULE | Refills: 5 | Status: SHIPPED | OUTPATIENT
Start: 2024-11-18

## 2024-11-18 RX ORDER — RISPERIDONE 1 MG/1
1 TABLET ORAL 3 TIMES DAILY
Qty: 270 TABLET | Refills: 5 | Status: SHIPPED | OUTPATIENT
Start: 2024-11-18 | End: 2026-05-12

## 2024-11-18 NOTE — BH TREATMENT PLAN
"TREATMENT PLAN (Medication Management Only)        [facility]      Name and Date of Birth:  Bryon Lopez 24 y.o. 2000 MRN: [id number]    Date of Visit: November 18, 2024    Diagnosis/Diagnoses:    1. Autism spectrum disorder  risperiDONE (RisperDAL) 1 mg tablet      2. Pervasive developmental disorder, residual  risperiDONE (RisperDAL) 1 mg tablet      3. ADHD (attention deficit hyperactivity disorder), combined type  atomoxetine (STRATTERA) 80 MG capsule          Strengths/Personal Resources for Self-Care: supportive family, taking medications as prescribed, ability to adapt to life changes, ability to communicate needs, ability to communicate well, ability to negotiate basic needs, being resoureceful, sense of humor.    Area/Areas of need (in own words): \"mood\"    1. Long Term Goal: maintain control of mood.  Target Date:6 months - 5/18/2025  Person/Persons responsible for completion of goal: Bryon    2.  Short Term Objective (s) - How will we reach this goal?:   A. Provider new recommended medication/dosage changes and/or continue medication(s): continue current medications as prescribed.  B. Keep regularly scheduled psychiatric appointments  C. Maintain adherence to psychotropic medication regimen   D. Limit chocolate intake  E. Maintain appropriate dietary intake  F. Practice adequate sleep hygiene    G. Find part-time employment, \"maybe at the [employer]\"     Target Date:6 months - 5/18/2025  Person/Persons Responsible for Completion of Goal: Bryon    Progress Towards Goals: continuing treatment    Treatment Modality: medication management every 3 months    Review due 180 days from date of this plan: 6 months - 5/18/2025  Expected length of service: ongoing treatment    My Physician/PA/NP and I have developed this plan together and I agree to work on the goals and objectives. I understand the treatment goals that were developed for my treatment. " Treatment Plan was completed with Bryon's assistance and input throughout today's session. Bryon consented to the information provided and documented above. Unfortunately, treatment plan was not physically signed at the time of this office visit secondary to time constraints and issues related to signature keypad. Again, Bryon did verbally consent.

## 2024-11-18 NOTE — PSYCH
"MEDICATION MANAGEMENT NOTE        [facility]      Name and Date of Birth:  Bryon Lopez 24 y.o. 2000 MRN: [id number]    Date of Visit: November 18, 2024    Reason for Visit: Follow-up visit for medication management       SUBJECTIVE:    Bryon Lopez is a 24 y.o. male with past psychiatric history significant for ASD, ADHD, and ID (full-scale IQ 88 - see neuropsych testing completed 10/25/23 via media) who was personally seen and evaluated today at the [facility] outpatient clinic for follow-up and medication management. Bryon presents alongside his father who assists with history gathering.      Bryon endorses compliance with psychotropic medication regimen consisting of Atomoxetine and Risperdal. He denies adverse medication side effects. Asif currently endorses psychiatric stability. He describes his mood as \"OK\" today. He currently rates his overall state of MH as \"7.5-8/10\" (10 being best he ever felt, 0 being worst). Asif's sleep and appetite are subjectively \"good\". He denies SI/HI when asked today. His energy and motivation are stable. He and his father share a \"to-do list\" that he has been trying to complete daily. He was applauded for his efforts. Asif's ADHD symptoms are well managed. No recent changes in concentration or focus. Asif is without anhedonia or crying spells. He found pleasure in recent trip to [place] in [place] with his father. Asif is without hopelessness or overwhelming anxiety. No recent panic attacks. He does not feel tense, on-edge, or restless currently. He attended the movies yesterday with his father which was enjoyable. Asif plans to travel to [facility] for [employer]. He continues to work with [employer] and [employer] regarding getting into school ([employer]) and/or finding employment. He shares that he plans to apply for employment to [employer] soon. During today's examination, Bryon does not exhibit objective " evidence of cassie/hypomania or psychosis. Bryon is not currently irritable, grandiose, labile, or pathologically euphoric. Asif denies recent outbursts or aggression. Bryon is without perceptual disturbances, such as A/V hallucinations, paranoia, ideas of reference, or delusional beliefs. Bryon denies recent ETOH or illicit substance abuse. Bryon offers no further concerns.     Current Rating Scores:     None completed today.    Review Of Systems:      Constitutional negative   ENT negative   Cardiovascular negative   Respiratory negative   Gastrointestinal negative   Genitourinary negative   Musculoskeletal negative   Integumentary negative   Neurological negative   Endocrine negative   Other Symptoms none, all other systems are negative       Past Psychiatric History: (unchanged information from previous note copied and italicized) - Information that is bolded has been updated.      Inpatient psychiatric admissions: Denies  Prior outpatient psychiatric linkage: Previously linked with Dr. Eliud Kiser via [facility]  Past/current psychotherapy: Denies - Is actively involved in [employer] with assistant Taz who assists with life skills and counseling  History of suicidal attempts/gestures: Denies  History of violence/aggressive behaviors: No overt physical aggression but history of irritability and agitation with sister   Psychotropic medication trials: Klonopin, Ritalin (too stimulated, hyperactive), Risperdal, Strattera   Substance abuse inpatient/outpatient rehabilitation: Denies     Substance Abuse History: (unchanged information from previous note copied and italicized) - Information that is bolded has been updated.      No history of ETOH, illict substance, or tobacco abuse. No past legal actions or arrests secondary to substance intoxication. The patient denies prior DWIs/DUIs. No history of outpatient/inpatient rehabilitation programs. Bryon does not exhibit objective evidence of substance withdrawal  during today's examination nor does Bryon appear under the influence of any psychoactive substance.          Social History: (unchanged information from previous note copied and italicized) - Information that is bolded has been updated.      Developmental: Documented history of milestone/developmental delay. Bryon was born full-term via vaginal birth. No birth complications noted. Denies a history of in-utero exposure to toxins/illicit substances. There a no documented history of IEP and need for special education. Full-scale IQ is unknown at the moment. Bryon has been actively involved in treatment since the age of 4 secondary to ADHD, ASD, and PDD.  Education: high school diploma/GED  Marital history: single  Living arrangement, social support: father and sister (24 years old, named Richard) - mother passed away in 2010 secondary to terminal illness (cancer)  Occupational History: No longer working part-time at [employer] - seeking new employment via [employer]  Access to firearms: Father reports that there are firearms in the hope but denies that Bryon has direct access to these weapons/firearms. Bryon Lopez has no history of arrests or violence with a deadly weapon.         Traumatic History: (unchanged information from previous note copied and italicized) - Information that is bolded has been updated.      Abuse:none is reported  Other Traumatic Events: Denies      Past Medical History:    Past Medical History:   Diagnosis Date    ADHD (attention deficit hyperactivity disorder)     Autism spectrum disorder     GERD (gastroesophageal reflux disease)         Past Surgical History:   Procedure Laterality Date    NO PAST SURGERIES       No Known Allergies    Substance Abuse History:    Social History     Substance and Sexual Activity   Alcohol Use No     Social History     Substance and Sexual Activity   Drug Use Never       Social History:    Social History     Socioeconomic History    Marital status:  Single     Spouse name: Not on file    Number of children: Not on file    Years of education: Not on file    Highest education level: Not on file   Occupational History    Not on file   Tobacco Use    Smoking status: Never    Smokeless tobacco: Never   Vaping Use    Vaping status: Never Used   Substance and Sexual Activity    Alcohol use: No    Drug use: Never    Sexual activity: Not on file   Other Topics Concern    Not on file   Social History Narrative    Not on file     Social Drivers of Health     Financial Resource Strain: Low Risk  (11/14/2023)    Overall Financial Resource Strain (CARDIA)     Difficulty of Paying Living Expenses: Not very hard   Food Insecurity: Not on file   Transportation Needs: No Transportation Needs (11/14/2023)    PRAPARE - Transportation     Lack of Transportation (Medical): No     Lack of Transportation (Non-Medical): No   Physical Activity: Not on file   Stress: Not on file   Social Connections: Not on file   Intimate Partner Violence: Not on file   Housing Stability: Not on file       Family Psychiatric History:     Family History   Problem Relation Age of Onset    Lung cancer Mother     Diabetes Father     Alcohol abuse Father         in sustained remission    Skin cancer Father     ADD / ADHD Sister     Autism Sister     No Known Problems Sister     No Known Problems Sister     No Known Problems Sister     No Known Problems Brother     Cancer Paternal Grandmother        History Review: The following portions of the patient's history were reviewed and updated as appropriate: allergies, current medications, past family history, past medical history, past social history, past surgical history, and problem list.         OBJECTIVE:     Vital signs in last 24 hours:    There were no vitals filed for this visit.    Mental Status Evaluation:    Appearance age appropriate, casually dressed, dressed appropriately, looks stated age   Behavior pleasant, cooperative, calm, good eye contact  "  Speech normal rate, normal volume, normal pitch   Mood \"OK\"   Affect normal range and intensity, appropriate   Thought Processes organized, coherent, goal directed   Associations intact associations   Thought Content no overt delusions   Perceptual Disturbances: no auditory hallucinations, no visual hallucinations   Abnormal Thoughts  Risk Potential Suicidal ideation - None at present  Homicidal ideation - None at present  Potential for aggression - No   Orientation oriented to person, place, time/date, and situation   Memory recent and remote memory grossly intact   Consciousness alert and awake   Attention Span Concentration Span attention span and concentration are age appropriate   Intellect appears to be of average intelligence   Insight intact and good   Judgement intact and good   Muscle Strength and  Gait normal gait and normal balance   Motor activity no abnormal movements   Language no difficulty naming common objects   Fund of Knowledge adequate knowledge of current events   Pain none   Pain Scale Did not ask patient to formally rate       Laboratory Results: I have personally reviewed all pertinent laboratory/tests results    Recent Labs (last 2 months):   No visits with results within 2 Month(s) from this visit.   Latest known visit with results is:   Appointment on 05/07/2024   Component Date Value    WBC 05/07/2024 7.28     RBC 05/07/2024 5.46     Hemoglobin 05/07/2024 15.4     Hematocrit 05/07/2024 46.5     MCV 05/07/2024 85     MCH 05/07/2024 28.2     MCHC 05/07/2024 33.1     RDW 05/07/2024 12.9     Platelets 05/07/2024 262     MPV 05/07/2024 9.5     Sodium 05/07/2024 139     Potassium 05/07/2024 4.2     Chloride 05/07/2024 105     CO2 05/07/2024 25     ANION GAP 05/07/2024 9     BUN 05/07/2024 12     Creatinine 05/07/2024 0.91     Glucose, Fasting 05/07/2024 100 (H)     Calcium 05/07/2024 9.0     AST 05/07/2024 26     ALT 05/07/2024 21     Alkaline Phosphatase 05/07/2024 56     Total Protein " 05/07/2024 7.1     Albumin 05/07/2024 4.7     Total Bilirubin 05/07/2024 0.85     eGFR 05/07/2024 116     Cholesterol 05/07/2024 127     Triglycerides 05/07/2024 63     HDL, Direct 05/07/2024 44     LDL Calculated 05/07/2024 70     Non-HDL-Chol (CHOL-HDL) 05/07/2024 83        Suicide/Homicide Risk Assessment:    The following interventions are recommended: no intervention changes needed      Lethality Statement:    Based on today's assessment and clinical criteria, Bryon Lopez contracts for safety and is not an imminent risk of harm to self or others. Outpatient level of care is deemed appropriate at this current time. Bryon understands that if they can no longer contract for safety, they need to call the office or report to their nearest Emergency Room for immediate evaluation.      Assessment/Plan:     Bryon Lopez is a 24 y.o. male with past psychiatric history significant for ASD, ADHD, and ID (full-scale IQ 88 - see neuropsych testing completed 10/25/23 via media) who was personally seen and evaluated today at the [facility] outpatient clinic for follow-up and medication management. Bryon has been involved in psychiatric/neurologic treatment since the age of 4 and has been seeing Dr. Eliud Kiser at [facility] for the last 15 years. Bryon has been trialed on numerous psychotropic medications, including Risperdal, Straterra, Klonopin, and Ritalin. He endorses ongoing compliance with current medication regimen that consists of Risperdal 1mg AM, 2mg PM and Straterra 80mg Daily. Father reports that rByon was excessively hyper, impulsive, and dysregulated during periods of psychostimulant use in the past, so these agents should be avoided. Bryno is currently in the process of being linked with Neurology as well secondary to PDD and ASD. Bryon has a history of IEP/504 plans, ritualistic behaviors, and is often rigid and inflexible in his cognitive struggle. Father  reports a pervasive difficulty with social interaction and emotional reciprocity. Bryon is currently involved in numerous community-based resources to assist with life skills and to foster greater autonomy and independence. He meets with Taz from [facility] and [facility] frequently and finds pleasure in this. Father denies recent behavioral outbursts or physical aggression. Bryon' sister, who also has ASD, appears to be a major trigger for Bryon. However, father denies physical altercations. Bryon denies historical neurovegetative symptomatology suggestive of major depressive disorder or dysthymia. Bryon vehemently denies any acute or chronic history suggestive of an underlying affective (bipolar) organization. Bryon denies historical symptomatology suggestive of an underlying psychotic process. Bryon denies historical symptomatology suggestive of PTSD, OCD, or disordered eating.      Today's Plan:     Psychopharmacologically, Asif reports benefit and tolerability with current regimen. He denies need for medication change or intervention.      DSM-V Diagnoses:      1.) Autism Spectrum Disorder  2.) ADHD  3.) ID        Treatment Recommendations/Precautions:        1.) Autism Spectrum Disorder  - Continue Risperdal 1mg AM, 2mg PM secondary to agitation and irritability associated with ASD  - Continue engagement in weekly socialization with Taz via [facility] - learning life skills, etc.   - Psychoeducation provided regarding the importance of exercise and healthy dietary choices and their impact on mood, energy, and motivation     2.) ADHD  - Continue Straterra 80mg Daily              - Hx of paradoxical excitation with psychostimulant use (Ritalin) - avoid         3.) ID  - Full scale IQ 88  - Completed updated neuropsych testing 10/25/23 - scanned into media   - Continue Risperdal 1mg AM, 2mg PM secondary to agitation and irritability associated with ASD    Assessment & Plan  Autism spectrum  disorder    Orders:    risperiDONE (RisperDAL) 1 mg tablet; Take 1 tablet (1 mg total) by mouth 3 (three) times a day    Pervasive developmental disorder, residual    Orders:    risperiDONE (RisperDAL) 1 mg tablet; Take 1 tablet (1 mg total) by mouth 3 (three) times a day    ADHD (attention deficit hyperactivity disorder), combined type    Orders:    atomoxetine (STRATTERA) 80 MG capsule; Take 1 capsule (80 mg total) by mouth daily        Does not want any medication changes  Aware of need to follow up with family physician for medical issues  Aware of 24 hour and weekend coverage for urgent situations accessed by calling [facility] main practice number    Medications Risks/Benefits      Risks, Benefits And Possible Side Effects Of Medications:    Risks, benefits, and possible side effects of medications explained to Bryon including risk of parkinsonian symptoms, Tardive Dyskinesia and metabolic syndrome related to treatment with antipsychotic medications. He verbalizes understanding and agreement for treatment.    Controlled Medication Discussion:     Not applicable    Psychotherapy Provided:     Individual psychotherapy provided: No     Treatment Plan:    Completed and signed during the session: Yes - with Bryon      Visit Time    Visit Start Time: 1:30 PM  Visit Stop Time: 2:00 PM  Total Visit Duration:  30 minutes     The total visit duration detailed above includes: patient engagement, medication management, psychotherapy/counseling, discussion regarding treatment goals, documentation, review of past medical records, and coordination of care.      Note Share Disclaimer:     This note was not shared with the patient due to reasonable likelihood of causing patient harm      Mario Benoit MD  Board Certified Diplomate of the American Board of Psychiatry and Neurology  11/18/24

## 2024-11-18 NOTE — ASSESSMENT & PLAN NOTE
Orders:    risperiDONE (RisperDAL) 1 mg tablet; Take 1 tablet (1 mg total) by mouth 3 (three) times a day

## 2024-11-19 ENCOUNTER — RA CDI HCC (OUTPATIENT)
Dept: OTHER | Facility: HOSPITAL | Age: 24
End: 2024-11-19

## 2024-11-19 ENCOUNTER — TELEPHONE (OUTPATIENT)
Dept: PSYCHIATRY | Facility: CLINIC | Age: 24
End: 2024-11-19

## 2024-11-19 LAB
APOB+LDLR+PCSK9 GENE MUT ANL BLD/T: NOT DETECTED
BRCA1+BRCA2 DEL+DUP + FULL MUT ANL BLD/T: NOT DETECTED
MLH1+MSH2+MSH6+PMS2 GN DEL+DUP+FUL M: NOT DETECTED

## 2024-11-26 ENCOUNTER — OFFICE VISIT (OUTPATIENT)
Dept: INTERNAL MEDICINE CLINIC | Facility: OTHER | Age: 24
End: 2024-11-26
Payer: COMMERCIAL

## 2024-11-26 VITALS
HEART RATE: 82 BPM | RESPIRATION RATE: 18 BRPM | WEIGHT: 177.4 LBS | OXYGEN SATURATION: 98 % | HEIGHT: 70 IN | TEMPERATURE: 97.9 F | BODY MASS INDEX: 25.4 KG/M2 | DIASTOLIC BLOOD PRESSURE: 78 MMHG | SYSTOLIC BLOOD PRESSURE: 124 MMHG

## 2024-11-26 DIAGNOSIS — Z13.1 SCREENING FOR DIABETES MELLITUS: ICD-10-CM

## 2024-11-26 DIAGNOSIS — F84.0 AUTISM SPECTRUM DISORDER: ICD-10-CM

## 2024-11-26 DIAGNOSIS — K21.9 GASTROESOPHAGEAL REFLUX DISEASE WITHOUT ESOPHAGITIS: ICD-10-CM

## 2024-11-26 DIAGNOSIS — F90.2 ADHD (ATTENTION DEFICIT HYPERACTIVITY DISORDER), COMBINED TYPE: ICD-10-CM

## 2024-11-26 DIAGNOSIS — F41.9 ANXIETY: Primary | ICD-10-CM

## 2024-11-26 DIAGNOSIS — F79 INTELLECTUAL DISABILITY: ICD-10-CM

## 2024-11-26 DIAGNOSIS — Z13.6 SCREENING FOR CARDIOVASCULAR CONDITION: ICD-10-CM

## 2024-11-26 DIAGNOSIS — Z00.00 MEDICARE ANNUAL WELLNESS VISIT, SUBSEQUENT: ICD-10-CM

## 2024-11-26 DIAGNOSIS — Z23 ENCOUNTER FOR IMMUNIZATION: ICD-10-CM

## 2024-11-26 PROCEDURE — G0439 PPPS, SUBSEQ VISIT: HCPCS | Performed by: INTERNAL MEDICINE

## 2024-11-26 PROCEDURE — 99214 OFFICE O/P EST MOD 30 MIN: CPT | Performed by: INTERNAL MEDICINE

## 2024-11-26 PROCEDURE — 90656 IIV3 VACC NO PRSV 0.5 ML IM: CPT

## 2024-11-26 PROCEDURE — G0008 ADMIN INFLUENZA VIRUS VAC: HCPCS

## 2024-11-26 PROCEDURE — G0444 DEPRESSION SCREEN ANNUAL: HCPCS | Performed by: INTERNAL MEDICINE

## 2024-11-26 NOTE — PROGRESS NOTES
Name: Bryon Lopez      : 2000      MRN: [id number]  Encounter Provider: Neil Sales MD  Encounter Date: 2024   Encounter department: [facility]    Assessment & Plan  Encounter for immunization    Orders:    influenza vaccine preservative-free 0.5 mL IM (Fluzone, Afluria, Fluarix, Flulaval)    ADHD (attention deficit hyperactivity disorder), combined type  Controlled, continue Strattera 80 mg daily. Management as per psychiatry.     Orders:    CBC; Future    Intellectual disability    Orders:    CBC; Future    Anxiety  Stable, controlled. Continue risperidone.     Orders:    CBC; Future    Autism spectrum disorder  Continue supportive care.     Orders:    CBC; Future    Gastroesophageal reflux disease without esophagitis  Controlled, resolved. Continue to monitor clinically.     Orders:    CBC; Future    Medicare annual wellness visit, subsequent         Screening for diabetes mellitus    Orders:    Comprehensive metabolic panel; Future    Screening for cardiovascular condition    Orders:    Comprehensive metabolic panel; Future    Lipid panel; Future       Preventive health issues were discussed with patient, and age appropriate screening tests were ordered as noted in patient's After Visit Summary. Personalized health advice and appropriate referrals for health education or preventive services given if needed, as noted in patient's After Visit Summary.    History of Present Illness     Bryon Lopez is seen today for follow up of chronic conditions.   Recent laboratory studies reviewed today with the patient.   he has been compliant with his medication regimen.     he has no complaints or concerns at this time.       Anxiety  Presents for follow-up visit. Patient reports no chest pain, confusion, nausea, palpitations or shortness of breath. Symptoms occur rarely. The severity of symptoms is mild. The quality of sleep is good. Nighttime awakenings:  none.     Compliance with medications is %.   Heartburn  He reports no abdominal pain, no chest pain, no coughing, no nausea, no sore throat or no wheezing. This is a chronic problem. The current episode started more than 1 year ago. The problem occurs rarely. The problem has been unchanged. Pertinent negatives include no fatigue. He has tried nothing for the symptoms.      Patient Care Team:  Neil Sales MD as PCP - General (Internal Medicine)    Review of Systems   Constitutional: Negative.  Negative for chills, fatigue and fever.   HENT:  Negative for congestion, ear pain, postnasal drip, rhinorrhea and sore throat.    Eyes: Negative.    Respiratory:  Negative for cough, chest tightness, shortness of breath and wheezing.    Cardiovascular:  Negative for chest pain and palpitations.   Gastrointestinal:  Negative for abdominal distention, abdominal pain, blood in stool, constipation, diarrhea and nausea.   Endocrine: Negative.    Genitourinary:  Negative for difficulty urinating, dysuria and hematuria.   Musculoskeletal: Negative.    Skin: Negative.    Allergic/Immunologic: Negative for environmental allergies and food allergies.   Neurological: Negative.    Hematological:  Negative for adenopathy.   Psychiatric/Behavioral:  Negative for agitation, behavioral problems, confusion and sleep disturbance.    All other systems reviewed and are negative.    Medical History Reviewed by provider this encounter:  Tobacco  Allergies  Meds  Problems  Med Hx  Surg Hx  Fam Hx       Annual Wellness Visit Questionnaire   Bryon is here for his Subsequent Wellness visit. Last Medicare Wellness visit information reviewed, patient interviewed and updates made to the record today.      Health Risk Assessment:   Patient rates overall health as very good. Patient feels that their physical health rating is much better. Patient is very satisfied with their life. Eyesight was rated as same. Hearing was rated as same.  Patient feels that their emotional and mental health rating is much better. Patients states they are never, rarely angry. Patient states they are never, rarely unusually tired/fatigued. Pain experienced in the last 7 days has been none. Patient states that he has experienced no weight loss or gain in last 6 months.     Depression Screening:   PHQ-2 Score: 0      Fall Risk Screening:   In the past year, patient has experienced: no history of falling in past year      Home Safety:  Patient does not have trouble with stairs inside or outside of their home. Patient has no working smoke alarms and has working carbon monoxide detector. Home safety hazards include: none.     Nutrition:   Current diet is Regular.     Medications:   Patient is currently taking over-the-counter supplements. OTC medications include: see medication list. Patient is able to manage medications.     Activities of Daily Living (ADLs)/Instrumental Activities of Daily Living (IADLs):   Walk and transfer into and out of bed and chair?: Yes  Dress and groom yourself?: Yes    Bathe or shower yourself?: Yes    Feed yourself? Yes  Do your laundry/housekeeping?: Yes  Manage your money, pay your bills and track your expenses?: Yes  Make your own meals?: Yes    Do your own shopping?: Yes    Previous Hospitalizations:   Any hospitalizations or ED visits within the last 12 months?: No      Advance Care Planning:   Living will: No    Durable POA for healthcare: No    Advanced directive: No      Cognitive Screening:   Provider or family/friend/caregiver concerned regarding cognition?: No    PREVENTIVE SCREENINGS      Cardiovascular Screening:    General: Screening Current and Risks and Benefits Discussed    Due for: Lipid Panel      Diabetes Screening:     General: Screening Current and Risks and Benefits Discussed    Due for: Blood Glucose      Colorectal Cancer Screening:     General: Screening Not Indicated      Prostate Cancer Screening:    General:  Screening Not Indicated      Osteoporosis Screening:    General: Screening Not Indicated      Abdominal Aortic Aneurysm (AAA) Screening:        General: Screening Not Indicated      Lung Cancer Screening:     General: Screening Not Indicated      Hepatitis C Screening:    General: Screening Current and Screening Not Indicated    Screening, Brief Intervention, and Referral to Treatment (SBIRT)    Screening  Typical number of drinks in a day: 0  Typical number of drinks in a week: 0  Interpretation: Low risk drinking behavior.    AUDIT-C Screenin) How often did you have a drink containing alcohol in the past year? never  2) How many drinks did you have on a typical day when you were drinking in the past year? 0  3) How often did you have 6 or more drinks on one occasion in the past year? never    AUDIT-C Score: 0  Interpretation: Score 0-3 (male): Negative screen for alcohol misuse    Single Item Drug Screening:  How often have you used an illegal drug (including marijuana) or a prescription medication for non-medical reasons in the past year? never    Single Item Drug Screen Score: 0  Interpretation: Negative screen for possible drug use disorder    Brief Intervention  Alcohol & drug use screenings were reviewed. No concerns regarding substance use disorder identified.     Annual Depression Screening  Time spent screening and evaluating the patient for depression during today's encounter was 5 minutes.    Other Counseling Topics:   Car/seat belt/driving safety, skin self-exam, sunscreen and calcium and vitamin D intake and regular weightbearing exercise.     Social Drivers of Health     Financial Resource Strain: Low Risk  (2023)    Overall Financial Resource Strain (CARDIA)     Difficulty of Paying Living Expenses: Not very hard   Food Insecurity: Patient Declined (2024)    Hunger Vital Sign     Worried About Running Out of Food in the Last Year: Patient declined     Ran Out of Food in the Last  "Year: Patient declined   Transportation Needs: Patient Declined (11/26/2024)    PRAPARE - Transportation     Lack of Transportation (Medical): Patient declined     Lack of Transportation (Non-Medical): Patient declined   Housing Stability: Patient Declined (11/26/2024)    Housing Stability Vital Sign     Unable to Pay for Housing in the Last Year: Patient declined     Number of Times Moved in the Last Year: 0     Homeless in the Last Year: Patient declined   Utilities: Not At Risk (11/26/2024)    [facility] Utilities     Threatened with loss of utilities: No     No results found.    Objective   /78 (BP Location: Left arm, Patient Position: Sitting, Cuff Size: Standard)   Pulse 82   Temp 97.9 °F (36.6 °C) (Temporal)   Resp 18   Ht 5' 10\" (1.778 m)   Wt 80.5 kg (177 lb 6.4 oz)   SpO2 98%   BMI 25.45 kg/m²     Physical Exam  Vitals and nursing note reviewed.   Constitutional:       General: He is not in acute distress.     Appearance: He is well-developed. He is not diaphoretic.   HENT:      Head: Normocephalic and atraumatic.   Eyes:      General: No scleral icterus.        Right eye: No discharge.         Left eye: No discharge.      Conjunctiva/sclera: Conjunctivae normal.      Pupils: Pupils are equal, round, and reactive to light.   Neck:      Thyroid: No thyromegaly.      Vascular: No JVD.   Cardiovascular:      Rate and Rhythm: Normal rate and regular rhythm.      Heart sounds: Normal heart sounds. No murmur heard.     No friction rub. No gallop.   Pulmonary:      Effort: Pulmonary effort is normal. No respiratory distress.      Breath sounds: Normal breath sounds. No wheezing or rales.   Chest:      Chest wall: No tenderness.   Abdominal:      General: Bowel sounds are normal. There is no distension.      Palpations: Abdomen is soft. There is no mass.      Tenderness: There is no abdominal tenderness. There is no guarding or rebound.   Musculoskeletal:         General: No tenderness or deformity. Normal " range of motion.      Cervical back: Normal range of motion and neck supple.   Lymphadenopathy:      Cervical: No cervical adenopathy.   Skin:     General: Skin is warm and dry.      Coloration: Skin is not pale.      Findings: No erythema or rash.   Neurological:      Mental Status: He is alert and oriented to person, place, and time.      Cranial Nerves: No cranial nerve deficit.      Coordination: Coordination normal.      Deep Tendon Reflexes: Reflexes are normal and symmetric.   Psychiatric:         Behavior: Behavior normal.         Thought Content: Thought content normal.         Judgment: Judgment normal.

## 2024-11-26 NOTE — ASSESSMENT & PLAN NOTE
Controlled, continue Strattera 80 mg daily. Management as per psychiatry.     Orders:    CBC; Future

## 2024-11-26 NOTE — PATIENT INSTRUCTIONS
Medicare Preventive Visit Patient Instructions  Thank you for completing your Welcome to Medicare Visit or Medicare Annual Wellness Visit today. Your next wellness visit will be due in one year (11/27/2025).  The screening/preventive services that you may require over the next 5-10 years are detailed below. Some tests may not apply to you based off risk factors and/or age. Screening tests ordered at today's visit but not completed yet may show as past due. Also, please note that scanned in results may not display below.  Preventive Screenings:  Service Recommendations Previous Testing/Comments   Colorectal Cancer Screening  Colonoscopy    Fecal Occult Blood Test (FOBT)/Fecal Immunochemical Test (FIT)  Fecal DNA/Cologuard Test  Flexible Sigmoidoscopy Age: 45-75 years old   Colonoscopy: every 10 years (May be performed more frequently if at higher risk)  OR  FOBT/FIT: every 1 year  OR  Cologuard: every 3 years  OR  Sigmoidoscopy: every 5 years  Screening may be recommended earlier than age 45 if at higher risk for colorectal cancer. Also, an individualized decision between you and your healthcare provider will decide whether screening between the ages of 76-85 would be appropriate. Colonoscopy: Not on file  FOBT/FIT: Not on file  Cologuard: Not on file  Sigmoidoscopy: Not on file          Prostate Cancer Screening Individualized decision between patient and health care provider in men between ages of 55-69   Medicare will cover every 12 months beginning on the day after your 50th birthday PSA: No results in last 5 years     Screening Not Indicated     Hepatitis C Screening Once for adults born between 1945 and 1965  More frequently in patients at high risk for Hepatitis C Hep C Antibody: Not on file    Screening Current   Diabetes Screening 1-2 times per year if you're at risk for diabetes or have pre-diabetes Fasting glucose: 100 mg/dL (5/7/2024)  A1C: 5.5 % (12/11/2023)  Screening Current   Cholesterol Screening  Once every 5 years if you don't have a lipid disorder. May order more often based on risk factors. Lipid panel: 05/07/2024  Screening Current      Other Preventive Screenings Covered by Medicare:  Abdominal Aortic Aneurysm (AAA) Screening: covered once if your at risk. You're considered to be at risk if you have a family history of AAA or a male between the age of 65-75 who smoking at least 100 cigarettes in your lifetime.  Lung Cancer Screening: covers low dose CT scan once per year if you meet all of the following conditions: (1) Age 55-77; (2) No signs or symptoms of lung cancer; (3) Current smoker or have quit smoking within the last 15 years; (4) You have a tobacco smoking history of at least 20 pack years (packs per day x number of years you smoked); (5) You get a written order from a healthcare provider.  Glaucoma Screening: covered annually if you're considered high risk: (1) You have diabetes OR (2) Family history of glaucoma OR (3)  aged 50 and older OR (4)  American aged 65 and older  Osteoporosis Screening: covered every 2 years if you meet one of the following conditions: (1) Have a vertebral abnormality; (2) On glucocorticoid therapy for more than 3 months; (3) Have primary hyperparathyroidism; (4) On osteoporosis medications and need to assess response to drug therapy.  HIV Screening: covered annually if you're between the age of 15-65. Also covered annually if you are younger than 15 and older than 65 with risk factors for HIV infection. For pregnant patients, it is covered up to 3 times per pregnancy.    Immunizations:  Immunization Recommendations   Influenza Vaccine Annual influenza vaccination during flu season is recommended for all persons aged >= 6 months who do not have contraindications   Pneumococcal Vaccine   * Pneumococcal conjugate vaccine = PCV13 (Prevnar 13), PCV15 (Vaxneuvance), PCV20 (Prevnar 20)  * Pneumococcal polysaccharide vaccine = PPSV23 (Pneumovax)  Adults 19-63 yo with certain risk factors or if 65+ yo  If never received any pneumonia vaccine: recommend Prevnar 20 (PCV20)  Give PCV20 if previously received 1 dose of PCV13 or PPSV23   Hepatitis B Vaccine 3 dose series if at intermediate or high risk (ex: diabetes, end stage renal disease, liver disease)   Respiratory syncytial virus (RSV) Vaccine - COVERED BY MEDICARE PART D  * RSVPreF3 (Arexvy) CDC recommends that adults 60 years of age and older may receive a single dose of RSV vaccine using shared clinical decision-making (SCDM)   Tetanus (Td) Vaccine - COST NOT COVERED BY MEDICARE PART B Following completion of primary series, a booster dose should be given every 10 years to maintain immunity against tetanus. Td may also be given as tetanus wound prophylaxis.   Tdap Vaccine - COST NOT COVERED BY MEDICARE PART B Recommended at least once for all adults. For pregnant patients, recommended with each pregnancy.   Shingles Vaccine (Shingrix) - COST NOT COVERED BY MEDICARE PART B  2 shot series recommended in those 19 years and older who have or will have weakened immune systems or those 50 years and older     Health Maintenance Due:      Topic Date Due   • HIV Screening  11/21/2025 (Originally 1/3/2014)   • Hepatitis C Screening  12/21/2025 (Originally 2000)     Immunizations Due:      Topic Date Due   • HPV Vaccine (2 - Male 3-dose series) 02/26/2016   • Influenza Vaccine (1) 09/01/2024   • COVID-19 Vaccine (4 - 2024-25 season) 09/01/2024     Advance Directives   What are advance directives?  Advance directives are legal documents that state your wishes and plans for medical care. These plans are made ahead of time in case you lose your ability to make decisions for yourself. Advance directives can apply to any medical decision, such as the treatments you want, and if you want to donate organs.   What are the types of advance directives?  There are many types of advance directives, and each state has rules  about how to use them. You may choose a combination of any of the following:  Living will:  This is a written record of the treatment you want. You can also choose which treatments you do not want, which to limit, and which to stop at a certain time. This includes surgery, medicine, IV fluid, and tube feedings.   Durable power of  for healthcare (DPAHC):  This is a written record that states who you want to make healthcare choices for you when you are unable to make them for yourself. This person, called a proxy, is usually a family member or a friend. You may choose more than 1 proxy.  Do not resuscitate (DNR) order:  A DNR order is used in case your heart stops beating or you stop breathing. It is a request not to have certain forms of treatment, such as CPR. A DNR order may be included in other types of advance directives.  Medical directive:  This covers the care that you want if you are in a coma, near death, or unable to make decisions for yourself. You can list the treatments you want for each condition. Treatment may include pain medicine, surgery, blood transfusions, dialysis, IV or tube feedings, and a ventilator (breathing machine).  Values history:  This document has questions about your views, beliefs, and how you feel and think about life. This information can help others choose the care that you would choose.  Why are advance directives important?  An advance directive helps you control your care. Although spoken wishes may be used, it is better to have your wishes written down. Spoken wishes can be misunderstood, or not followed. Treatments may be given even if you do not want them. An advance directive may make it easier for your family to make difficult choices about your care.   Weight Management   Why it is important to manage your weight:  Being overweight increases your risk of health conditions such as heart disease, high blood pressure, type 2 diabetes, and certain types of cancer. It  can also increase your risk for osteoarthritis, sleep apnea, and other respiratory problems. Aim for a slow, steady weight loss. Even a small amount of weight loss can lower your risk of health problems.  How to lose weight safely:  A safe and healthy way to lose weight is to eat fewer calories and get regular exercise. You can lose up about 1 pound a week by decreasing the number of calories you eat by 500 calories each day.   Healthy meal plan for weight management:  A healthy meal plan includes a variety of foods, contains fewer calories, and helps you stay healthy. A healthy meal plan includes the following:  Eat whole-grain foods more often.  A healthy meal plan should contain fiber. Fiber is the part of grains, fruits, and vegetables that is not broken down by your body. Whole-grain foods are healthy and provide extra fiber in your diet. Some examples of whole-grain foods are whole-wheat breads and pastas, oatmeal, brown rice, and bulgur.  Eat a variety of vegetables every day.  Include dark, leafy greens such as spinach, kale, elizabeth greens, and mustard greens. Eat yellow and orange vegetables such as carrots, sweet potatoes, and winter squash.   Eat a variety of fruits every day.  Choose fresh or canned fruit (canned in its own juice or light syrup) instead of juice. Fruit juice has very little or no fiber.  Eat low-fat dairy foods.  Drink fat-free (skim) milk or 1% milk. Eat fat-free yogurt and low-fat cottage cheese. Try low-fat cheeses such as mozzarella and other reduced-fat cheeses.  Choose meat and other protein foods that are low in fat.  Choose beans or other legumes such as split peas or lentils. Choose fish, skinless poultry (chicken or turkey), or lean cuts of red meat (beef or pork). Before you cook meat or poultry, cut off any visible fat.   Use less fat and oil.  Try baking foods instead of frying them. Add less fat, such as margarine, sour cream, regular salad dressing and mayonnaise to  foods. Eat fewer high-fat foods. Some examples of high-fat foods include french fries, doughnuts, ice cream, and cakes.  Eat fewer sweets.  Limit foods and drinks that are high in sugar. This includes candy, cookies, regular soda, and sweetened drinks.  Exercise:  Exercise at least 30 minutes per day on most days of the week. Some examples of exercise include walking, biking, dancing, and swimming. You can also fit in more physical activity by taking the stairs instead of the elevator or parking farther away from stores. Ask your healthcare provider about the best exercise plan for you.      © Copyright [employer] 2018 Information is for End User's use only and may not be sold, redistributed or otherwise used for commercial purposes. All illustrations and images included in CareNotes® are the copyrighted property of [employer] or [employer]!

## 2025-05-12 ENCOUNTER — APPOINTMENT (OUTPATIENT)
Dept: LAB | Age: 25
End: 2025-05-12
Payer: COMMERCIAL

## 2025-05-12 DIAGNOSIS — Z13.6 SCREENING FOR CARDIOVASCULAR CONDITION: ICD-10-CM

## 2025-05-12 DIAGNOSIS — F90.2 ADHD (ATTENTION DEFICIT HYPERACTIVITY DISORDER), COMBINED TYPE: ICD-10-CM

## 2025-05-12 DIAGNOSIS — F41.9 ANXIETY: ICD-10-CM

## 2025-05-12 DIAGNOSIS — F79 INTELLECTUAL DISABILITY: ICD-10-CM

## 2025-05-12 DIAGNOSIS — Z13.1 SCREENING FOR DIABETES MELLITUS: ICD-10-CM

## 2025-05-12 DIAGNOSIS — K21.9 GASTROESOPHAGEAL REFLUX DISEASE WITHOUT ESOPHAGITIS: ICD-10-CM

## 2025-05-12 DIAGNOSIS — F84.0 AUTISM SPECTRUM DISORDER: ICD-10-CM

## 2025-05-12 LAB
ALBUMIN SERPL BCG-MCNC: 4.9 G/DL (ref 3.5–5)
ALP SERPL-CCNC: 63 U/L (ref 34–104)
ALT SERPL W P-5'-P-CCNC: 20 U/L (ref 7–52)
ANION GAP SERPL CALCULATED.3IONS-SCNC: 7 MMOL/L (ref 4–13)
AST SERPL W P-5'-P-CCNC: 20 U/L (ref 13–39)
BILIRUB SERPL-MCNC: 0.9 MG/DL (ref 0.2–1)
BUN SERPL-MCNC: 14 MG/DL (ref 5–25)
CALCIUM SERPL-MCNC: 9.6 MG/DL (ref 8.4–10.2)
CHLORIDE SERPL-SCNC: 103 MMOL/L (ref 96–108)
CHOLEST SERPL-MCNC: 165 MG/DL (ref ?–200)
CO2 SERPL-SCNC: 28 MMOL/L (ref 21–32)
CREAT SERPL-MCNC: 0.91 MG/DL (ref 0.6–1.3)
ERYTHROCYTE [DISTWIDTH] IN BLOOD BY AUTOMATED COUNT: 12.7 % (ref 11.6–15.1)
GFR SERPL CREATININE-BSD FRML MDRD: 115 ML/MIN/1.73SQ M
GLUCOSE P FAST SERPL-MCNC: 81 MG/DL (ref 65–99)
HCT VFR BLD AUTO: 48.6 % (ref 36.5–49.3)
HDLC SERPL-MCNC: 49 MG/DL
HGB BLD-MCNC: 16.3 G/DL (ref 12–17)
LDLC SERPL CALC-MCNC: 97 MG/DL (ref 0–100)
MCH RBC QN AUTO: 28.7 PG (ref 26.8–34.3)
MCHC RBC AUTO-ENTMCNC: 33.5 G/DL (ref 31.4–37.4)
MCV RBC AUTO: 86 FL (ref 82–98)
NONHDLC SERPL-MCNC: 116 MG/DL
PLATELET # BLD AUTO: 273 THOUSANDS/UL (ref 149–390)
PMV BLD AUTO: 9.2 FL (ref 8.9–12.7)
POTASSIUM SERPL-SCNC: 4 MMOL/L (ref 3.5–5.3)
PROT SERPL-MCNC: 7.5 G/DL (ref 6.4–8.4)
RBC # BLD AUTO: 5.68 MILLION/UL (ref 3.88–5.62)
SODIUM SERPL-SCNC: 138 MMOL/L (ref 135–147)
TRIGL SERPL-MCNC: 96 MG/DL (ref ?–150)
WBC # BLD AUTO: 11.42 THOUSAND/UL (ref 4.31–10.16)

## 2025-05-12 PROCEDURE — 80061 LIPID PANEL: CPT

## 2025-05-12 PROCEDURE — 85027 COMPLETE CBC AUTOMATED: CPT

## 2025-05-12 PROCEDURE — 80053 COMPREHEN METABOLIC PANEL: CPT

## 2025-05-12 PROCEDURE — 36415 COLL VENOUS BLD VENIPUNCTURE: CPT

## 2025-05-14 ENCOUNTER — RESULTS FOLLOW-UP (OUTPATIENT)
Dept: OTHER | Facility: HOSPITAL | Age: 25
End: 2025-05-14

## 2025-05-19 ENCOUNTER — OFFICE VISIT (OUTPATIENT)
Dept: PSYCHIATRY | Facility: CLINIC | Age: 25
End: 2025-05-19
Payer: COMMERCIAL

## 2025-05-19 ENCOUNTER — TELEPHONE (OUTPATIENT)
Age: 25
End: 2025-05-19

## 2025-05-19 DIAGNOSIS — F41.9 ANXIETY: ICD-10-CM

## 2025-05-19 DIAGNOSIS — F84.0 AUTISM SPECTRUM DISORDER: Primary | ICD-10-CM

## 2025-05-19 DIAGNOSIS — F90.2 ADHD (ATTENTION DEFICIT HYPERACTIVITY DISORDER), COMBINED TYPE: ICD-10-CM

## 2025-05-19 DIAGNOSIS — F84.0 AUTISM SPECTRUM DISORDER: ICD-10-CM

## 2025-05-19 DIAGNOSIS — F84.9 PERVASIVE DEVELOPMENTAL DISORDER, RESIDUAL: ICD-10-CM

## 2025-05-19 PROCEDURE — 99213 OFFICE O/P EST LOW 20 MIN: CPT | Performed by: STUDENT IN AN ORGANIZED HEALTH CARE EDUCATION/TRAINING PROGRAM

## 2025-05-19 PROCEDURE — G2211 COMPLEX E/M VISIT ADD ON: HCPCS | Performed by: STUDENT IN AN ORGANIZED HEALTH CARE EDUCATION/TRAINING PROGRAM

## 2025-05-19 RX ORDER — RISPERIDONE 1 MG/1
1 TABLET ORAL 3 TIMES DAILY
Qty: 270 TABLET | Refills: 5 | Status: SHIPPED | OUTPATIENT
Start: 2025-05-19 | End: 2025-05-19 | Stop reason: SDUPTHER

## 2025-05-19 RX ORDER — ATOMOXETINE 80 MG/1
80 CAPSULE ORAL DAILY
Qty: 90 CAPSULE | Refills: 5 | Status: SHIPPED | OUTPATIENT
Start: 2025-05-19 | End: 2025-05-19 | Stop reason: SDUPTHER

## 2025-05-19 RX ORDER — ATOMOXETINE 80 MG/1
80 CAPSULE ORAL DAILY
Qty: 90 CAPSULE | Refills: 5 | Status: SHIPPED | OUTPATIENT
Start: 2025-05-19

## 2025-05-19 RX ORDER — RISPERIDONE 1 MG/1
1 TABLET ORAL 3 TIMES DAILY
Qty: 270 TABLET | Refills: 5 | Status: SHIPPED | OUTPATIENT
Start: 2025-05-19 | End: 2026-11-10

## 2025-05-19 NOTE — TELEPHONE ENCOUNTER
Patient father calling to advise patient had an appt today and the medications were sent to the wrong pharmacy.  [facility] pharmacy as since cancelled the scripts.  Patient father is requesting the scripts be sent to [facility] in [place]

## 2025-05-19 NOTE — PSYCH
"MEDICATION MANAGEMENT NOTE        [facility] - MENTAL HEALTH SERVICES      Name and Date of Birth:  Bryon Lopez 25 y.o. 2000 MRN: [id number]    Date of Visit: May 19, 2025    Reason for Visit: Follow-up visit for medication management       SUBJECTIVE:    Bryon Lopez is a 25 y.o. male with past psychiatric history significant for ASD, ADHD, and ID (full-scale IQ 88 - see neuropsych testing completed 10/25/23 via media) who was personally seen and evaluated today at the [facility] outpatient clinic for follow-up and medication management. Bryon presents as calm, pleasant, and cooperative. He completes assessment without difficulty.     Bryon endorses compliance with psychotropic medication regimen consisting of Risperdal and Atomoxetine. He denies adverse medication side effects. Asif reports psychiatric stability at the moment. He describes his mood and overall state of MH as \"good\". He shares that his sleep and appetite are adequate. He recently completed metabolic labs which were reviewed today. His cholesterol has increased over the last year but remains within normal limits. I spoke with Asif today regarding potential benefits from dietary changes. He was receptive. Asif denies SI/HI when asked. He states that his energy and motivation are \"fine\". He denies new onset, pathologic ADHD symptoms. He is forgetful at times and inattentive but this is not negatively impairing functionality. Asif shares that he continues to engage with Taz on a weekly basis. Dad reports he is getting additional services via an individual named \"Jina\", who will help Asif with social skills and learning emotional reciprocity. Acutely, Asif is without anhedonia, crying spells, or hopelessness. 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 He shares that he and his father recently watched a movie about the war of 1812 and shares details regarding this with this writer today. " "Bryon currently endorses occasional and appropriate anxiety that is not pathologic in nature. Bryon denies recent periods of extreme or excessive nervousness, irrational worry, or overt anxiousness. Bryon is restless at times secondary to ASD. Today, he does not appear tense, \"keyed up\" or on-edge. Bryon denies new-onset panic symptomatology or maladaptive behaviors. Throughout today's session, Bryon does not appear visibly perturbed. Acutely, Bryon does not exhibit objective evidence of cassie/hypomania or psychosis. Bryon is not currently irritable, grandiose, labile, or pathologically euphoric. He denies recent aggression or outbursts at home. His use of Risperdal is still effective. I did speak with Asif and father about transitioning him to an NEWMAN (Uzedy) to which they were agreeable. Will submit orders to insurance today to seek approval. Bryon is without perceptual disturbances, such as A/V hallucinations, paranoia, ideas of reference, or delusional beliefs. Bryon denies recent ETOH or illicit substance abuse. Bryon offers no further concerns.       Current Rating Scores:     None completed today.    Review Of Systems:      Constitutional negative   ENT negative   Cardiovascular negative   Respiratory negative   Gastrointestinal negative   Genitourinary negative   Musculoskeletal negative   Integumentary negative   Neurological negative   Endocrine negative   Other Symptoms none, all other systems are negative       Past Psychiatric History: (unchanged information from previous note copied and italicized) - Information that is bolded has been updated.      Inpatient psychiatric admissions: Denies  Prior outpatient psychiatric linkage: Previously linked with Dr. Eliud Kiser via [facility]  Past/current psychotherapy: Denies - Is actively involved in [employer] with assistant Taz who assists with life skills and counseling, will start seeing \"Jina\" soon as well  History of suicidal " attempts/gestures: Denies  History of violence/aggressive behaviors: No overt physical aggression but history of irritability and agitation with sister   Psychotropic medication trials: Klonopin, Ritalin (too stimulated, hyperactive), Risperdal, Strattera   Substance abuse inpatient/outpatient rehabilitation: Denies     Substance Abuse History: (unchanged information from previous note copied and italicized) - Information that is bolded has been updated.      No history of ETOH, illict substance, or tobacco abuse. No past legal actions or arrests secondary to substance intoxication. The patient denies prior DWIs/DUIs. No history of outpatient/inpatient rehabilitation programs. Bryon does not exhibit objective evidence of substance withdrawal during today's examination nor does Bryon appear under the influence of any psychoactive substance.          Social History: (unchanged information from previous note copied and italicized) - Information that is bolded has been updated.      Developmental: Documented history of milestone/developmental delay. Bryon was born full-term via vaginal birth. No birth complications noted. Denies a history of in-utero exposure to toxins/illicit substances. There a no documented history of IEP and need for special education. Full-scale IQ is unknown at the moment. Bryon has been actively involved in treatment since the age of 4 secondary to ADHD, ASD, and PDD.  Education: high school diploma/GED  Marital history: single  Living arrangement, social support: father and sister (named Richard) - mother passed away in 2010 secondary to terminal illness (cancer)  Occupational History: No longer working part-time at [employer] - seeking new employment via [employer]  Access to firearms: Father reports that there are firearms in the hope but denies that Bryon has direct access to these weapons/firearms. Bryon Lopez has no history of arrests or violence with a deadly weapon.          Traumatic History: (unchanged information from previous note copied and italicized) - Information that is bolded has been updated.      Abuse:none is reported  Other Traumatic Events: Mother's death    Past Medical History:    Past Medical History:   Diagnosis Date    ADHD (attention deficit hyperactivity disorder)     Autism spectrum disorder     GERD (gastroesophageal reflux disease)         Past Surgical History:   Procedure Laterality Date    NO PAST SURGERIES       Allergies[1]    Substance Abuse History:    Social History     Substance and Sexual Activity   Alcohol Use No     Social History     Substance and Sexual Activity   Drug Use Never       Social History:    Social History     Socioeconomic History    Marital status: Single     Spouse name: Not on file    Number of children: Not on file    Years of education: Not on file    Highest education level: Not on file   Occupational History    Not on file   Tobacco Use    Smoking status: Never    Smokeless tobacco: Never   Vaping Use    Vaping status: Never Used   Substance and Sexual Activity    Alcohol use: No    Drug use: Never    Sexual activity: Not on file   Other Topics Concern    Not on file   Social History Narrative    Not on file     Social Drivers of Health     Financial Resource Strain: Low Risk  (11/14/2023)    Overall Financial Resource Strain (CARDIA)     Difficulty of Paying Living Expenses: Not very hard   Food Insecurity: Patient Declined (11/26/2024)    Nursing - Inadequate Food Risk Classification     Worried About Running Out of Food in the Last Year: Patient declined     Ran Out of Food in the Last Year: Patient declined     Ran Out of Food in the Last Year: Not on file   Transportation Needs: Patient Declined (11/26/2024)    PRAPARE - Transportation     Lack of Transportation (Medical): Patient declined     Lack of Transportation (Non-Medical): Patient declined   Physical Activity: Not on file   Stress: Not on file   Social  "Connections: Not on file   Intimate Partner Violence: Not on file   Housing Stability: Patient Declined (11/26/2024)    Housing Stability Vital Sign     Unable to Pay for Housing in the Last Year: Patient declined     Number of Times Moved in the Last Year: 0     Homeless in the Last Year: Patient declined       Family Psychiatric History:     Family History   Problem Relation Age of Onset    Lung cancer Mother     Diabetes Father     Alcohol abuse Father         in sustained remission    Skin cancer Father     ADD / ADHD Sister     Autism Sister     No Known Problems Sister     No Known Problems Sister     No Known Problems Sister     No Known Problems Brother     Cancer Paternal Grandmother        History Review: The following portions of the patient's history were reviewed and updated as appropriate: allergies, current medications, past family history, past medical history, past social history, past surgical history, and problem list.         OBJECTIVE:     Vital signs in last 24 hours:    There were no vitals filed for this visit.    Mental Status Evaluation:    Appearance age appropriate, casually dressed, dressed appropriately, looks stated age   Behavior pleasant, cooperative, fair eye contact   Speech normal rate, normal volume, normal pitch   Mood \"Good\"   Affect blunted   Thought Processes organized, logical, goal directed   Associations intact associations   Thought Content no overt delusions   Perceptual Disturbances: no auditory hallucinations, no visual hallucinations   Abnormal Thoughts  Risk Potential Suicidal ideation - None at present  Homicidal ideation - None at present  Potential for aggression - No   Orientation oriented to person, place, time/date, and situation   Memory Did not formally test today   Consciousness alert and awake   Attention Span Concentration Span attention span and concentration are age appropriate   Intellect appears to be below average intelligence   Insight intact and " good   Judgement intact and good   Muscle Strength and  Gait normal gait and normal balance   Motor activity no abnormal movements   Language no difficulty naming common objects   Fund of Knowledge adequate knowledge of current events   Pain none   Pain Scale Did not ask patient to formally rate       Laboratory Results: I have personally reviewed all pertinent laboratory/tests results    Recent Labs:   Appointment on 05/12/2025   Component Date Value    WBC 05/12/2025 11.42 (H)     RBC 05/12/2025 5.68 (H)     Hemoglobin 05/12/2025 16.3     Hematocrit 05/12/2025 48.6     MCV 05/12/2025 86     MCH 05/12/2025 28.7     MCHC 05/12/2025 33.5     RDW 05/12/2025 12.7     Platelets 05/12/2025 273     MPV 05/12/2025 9.2     Sodium 05/12/2025 138     Potassium 05/12/2025 4.0     Chloride 05/12/2025 103     CO2 05/12/2025 28     ANION GAP 05/12/2025 7     BUN 05/12/2025 14     Creatinine 05/12/2025 0.91     Glucose, Fasting 05/12/2025 81     Calcium 05/12/2025 9.6     AST 05/12/2025 20     ALT 05/12/2025 20     Alkaline Phosphatase 05/12/2025 63     Total Protein 05/12/2025 7.5     Albumin 05/12/2025 4.9     Total Bilirubin 05/12/2025 0.90     eGFR 05/12/2025 115     Cholesterol 05/12/2025 165     Triglycerides 05/12/2025 96     HDL, Direct 05/12/2025 49     LDL Calculated 05/12/2025 97     Non-HDL-Chol (CHOL-HDL) 05/12/2025 116        Suicide/Homicide Risk Assessment:    The following interventions are recommended: continue medication management, no intervention changes needed      Lethality Statement:    Based on today's assessment and clinical criteria, Bryon Lopez contracts for safety and is not an imminent risk of harm to self or others. Outpatient level of care is deemed appropriate at this current time. Bryon understands that if they can no longer contract for safety, they need to call the office or report to their nearest Emergency Room for immediate evaluation.      Assessment/Plan:     Bryon Lpoez is a  25 y.o. male with past psychiatric history significant for ASD, ADHD, and ID (full-scale IQ 88 - see neuropsych testing completed 10/25/23 via media) who was personally seen and evaluated today at the [facility] outpatient clinic for follow-up and medication management. Bryon has been involved in psychiatric/neurologic treatment since the age of 4 and has been seeing Dr. Eliud Kiser at [facility] for the last 15 years. Bryon has been trialed on numerous psychotropic medications, including Risperdal, Straterra, Klonopin, and Ritalin. He endorses ongoing compliance with current medication regimen that consists of Risperdal 1mg AM, 2mg PM and Straterra 80mg Daily. Father reports that Bryon was excessively hyper, impulsive, and dysregulated during periods of psychostimulant use in the past, so these agents should be avoided. Bryon has a history of IEP/504 plans, ritualistic behaviors, and is often rigid and inflexible in his cognitive struggle. Father reports a pervasive difficulty with social interaction and emotional reciprocity. Bryon is currently involved in numerous community-based resources to assist with life skills and to foster greater autonomy and independence. He meets with Taz from [facility] and Jina frequently and finds pleasure in this. Father denies recent behavioral outbursts or physical aggression. Bryon' sister, who also has ASD, appears to be a major trigger for Bryon. However, father denies physical altercations. Bryon denies historical neurovegetative symptomatology suggestive of major depressive disorder or dysthymia. Bryon vehemently denies any acute or chronic history suggestive of an underlying affective (bipolar) organization. Bryon denies historical symptomatology suggestive of an underlying psychotic process. Bryon denies historical symptomatology suggestive of PTSD, OCD, or disordered eating.      Today's Plan:     Psychopharmacologically, Asif  reports benefit and tolerability with current regimen. He denies lethality concerns. We did discuss transitioning from oral Risperdal to Uzedy 1 month injections. If tolerated after 3 months, will transition to Q2 month injections. Will submit orders today.      DSM-V Diagnoses:      1.) Autism Spectrum Disorder  2.) ADHD  3.) ID        Treatment Recommendations/Precautions:        1.) Autism Spectrum Disorder  - Continue Risperdal 1mg AM, 2mg PM secondary to agitation and irritability associated with ASD   - Will attempt to start Uzedy 75mg Q30 days (subcutaneous - abdomen or upper arm)  - AIMS score (5/19/25): 0  - Metabolic labs completed 5/12/25 and reviewed today  - Continue engagement in weekly socialization with Taz via [employer] - learning life skills, etc.   - Psychoeducation provided regarding the importance of exercise and healthy dietary choices and their impact on mood, energy, and motivation     2.) ADHD  - Continue Straterra 80mg Daily              - Hx of paradoxical excitation with psychostimulant use (Ritalin) - avoid         3.) ID  - Full scale IQ 88  - Completed updated neuropsych testing 10/25/23 - scanned into media   - Continue Risperdal 1mg AM, 2mg PM secondary to agitation and irritability associated with ASD   - Will attempt to start Uzedy 75mg Q30 days (subcutaneous - abdomen or upper arm)  Assessment & Plan  Autism spectrum disorder    Orders:    risperiDONE (RisperDAL) 1 mg tablet; Take 1 tablet (1 mg total) by mouth 3 (three) times a day    ADHD (attention deficit hyperactivity disorder), combined type    Orders:    atomoxetine (STRATTERA) 80 MG capsule; Take 1 capsule (80 mg total) by mouth daily    Anxiety         Pervasive developmental disorder, residual    Orders:    risperiDONE (RisperDAL) 1 mg tablet; Take 1 tablet (1 mg total) by mouth 3 (three) times a day        Aware of need to follow up with family physician for medical issues  Aware of 24 hour and weekend coverage for urgent  situations accessed by calling [facility] main practice number    Medications Risks/Benefits      Risks, Benefits And Possible Side Effects Of Medications:    Risks, benefits, and possible side effects of medications explained to Bryon including risk of parkinsonian symptoms, Tardive Dyskinesia and metabolic syndrome related to treatment with antipsychotic medications. He verbalizes understanding and agreement for treatment.    Controlled Medication Discussion:     Not applicable    Psychotherapy Provided:     Individual psychotherapy provided: Yes  Medication changes discussed with Bryon.  Medication education provided to Bryon.     Treatment Plan:    Completed and signed during the session: Yes - with Bryon      Visit Time    Visit Start Time: 12:30 PM  Visit Stop Time: 12:57 PM  Total Visit Duration: 7 minutes     The total visit duration detailed above includes: patient engagement, medication management, psychotherapy/counseling, discussion regarding treatment goals, documentation, review of past medical records, and coordination of care.        Mario Benoit MD  Board Certified Diplomate of the American Board of Psychiatry and Neurology  05/19/25         [1] No Known Allergies

## 2025-05-19 NOTE — TELEPHONE ENCOUNTER
Pt is asking for scripts (Strattera and Risperdal) to be sent to [facility] in [place].       Scripts were sent to [facility].

## 2025-05-19 NOTE — BH TREATMENT PLAN
"TREATMENT PLAN (Medication Management Only)        [facility]      Name and Date of Birth:  Bryon Lopez 25 y.o. 2000 MRN: [id number]    Date of Visit: May 19, 2025    Diagnosis/Diagnoses:    1. Autism spectrum disorder  risperiDONE (RisperDAL) 1 mg tablet      2. ADHD (attention deficit hyperactivity disorder), combined type  atomoxetine (STRATTERA) 80 MG capsule      3. Anxiety        4. Pervasive developmental disorder, residual  risperiDONE (RisperDAL) 1 mg tablet          Strengths/Personal Resources for Self-Care: supportive family, taking medications as prescribed, ability to communicate needs, ability to understand psychiatric illness, family ties, good physical health, motivation for treatment, ability to negotiate basic needs, being resoureceful, sense of humor.    Area/Areas of need (in own words): \"mood\"    1. Long Term Goal: maintain control of mood.  Target Date:6 months - 11/19/2025  Person/Persons responsible for completion of goal: Bryon    2.  Short Term Objective (s) - How will we reach this goal?:   A. Provider new recommended medication/dosage changes and/or continue medication(s): continue current medications as prescribed.  B. Keep regularly scheduled psychiatric appointments  C. Maintain adherence to psychotropic medication regimen   D. Socialize more  E. Maintain appropriate dietary intake  F. Practice adequate sleep hygiene    G. Start working with Jina    Target Date:6 months - 11/19/2025  Person/Persons Responsible for Completion of Goal: Bryon    Progress Towards Goals: continuing treatment    Treatment Modality: medication management every 3 months    Review due 180 days from date of this plan: 6 months - 11/19/2025  Expected length of service: ongoing treatment    My Physician/PA/NP and I have developed this plan together and I agree to work on the goals and objectives. I understand the treatment goals that were " developed for my treatment. Treatment Plan was completed with Bryon's assistance and input throughout today's session. Bryon consented to the information provided and documented above. Unfortunately, treatment plan was not physically signed at the time of this office visit secondary to time constraints and issues related to signature keypad. Again, Bryon did verbally consent.

## 2025-07-17 ENCOUNTER — OFFICE VISIT (OUTPATIENT)
Dept: INTERNAL MEDICINE CLINIC | Facility: OTHER | Age: 25
End: 2025-07-17
Payer: MEDICARE

## 2025-07-17 VITALS
OXYGEN SATURATION: 98 % | HEART RATE: 81 BPM | DIASTOLIC BLOOD PRESSURE: 78 MMHG | HEIGHT: 71 IN | WEIGHT: 173.2 LBS | BODY MASS INDEX: 24.25 KG/M2 | SYSTOLIC BLOOD PRESSURE: 106 MMHG | TEMPERATURE: 97.6 F

## 2025-07-17 DIAGNOSIS — R22.42 MASS OF LEFT FOOT: Primary | ICD-10-CM

## 2025-07-17 PROCEDURE — 99213 OFFICE O/P EST LOW 20 MIN: CPT | Performed by: NURSE PRACTITIONER

## 2025-07-17 PROCEDURE — G2211 COMPLEX E/M VISIT ADD ON: HCPCS | Performed by: NURSE PRACTITIONER

## 2025-07-17 NOTE — PROGRESS NOTES
"Name: Bryon Lopez      : 2000      MRN: [id number]  Encounter Provider: RADHA Garcia  Encounter Date: 2025   Encounter department: [facility]  :  Assessment & Plan  Mass of left foot  4 x 2.5cm soft, fixed mass of left foot, presents for 2 months and increasing in size. Non-tender   Check US of left foot  Referral made to follow up with podiatry     Orders:    Ambulatory Referral to Podiatry; Future    US extremity soft tissue; Future           History of Present Illness   HPI    Patient presents today accompanied by his father with concern for a cyst on his left foot, noticed it about 2 months ago. They believe the cyst is growing. He denies any pain.       Review of Systems   Constitutional:  Negative for chills and fever.   Skin:  Negative for color change, rash and wound.       Objective   /78 (BP Location: Left arm, Patient Position: Sitting, Cuff Size: Standard)   Pulse 81   Temp 97.6 °F (36.4 °C) (Tympanic)   Ht 5' 11\" (1.803 m)   Wt 78.6 kg (173 lb 3.2 oz)   SpO2 98%   BMI 24.16 kg/m²      Physical Exam  Vitals reviewed.   Constitutional:       General: He is not in acute distress.     Appearance: Normal appearance. He is normal weight. He is not diaphoretic.   Pulmonary:      Effort: Pulmonary effort is normal. No respiratory distress.     Musculoskeletal:      Left foot: No tenderness or bony tenderness.        Feet:      Neurological:      Mental Status: He is alert. Mental status is at baseline.     Psychiatric:         Mood and Affect: Mood normal.         Behavior: Behavior normal.           "

## 2025-07-21 ENCOUNTER — HOSPITAL ENCOUNTER (OUTPATIENT)
Dept: RADIOLOGY | Facility: IMAGING CENTER | Age: 25
Discharge: HOME | End: 2025-07-21
Attending: NURSE PRACTITIONER
Payer: MEDICARE

## 2025-07-21 DIAGNOSIS — R22.42 MASS OF LEFT FOOT: ICD-10-CM

## 2025-07-21 PROCEDURE — 76882 US LMTD JT/FCL EVL NVASC XTR: CPT
